# Patient Record
Sex: MALE | Race: WHITE | NOT HISPANIC OR LATINO | Employment: OTHER | URBAN - METROPOLITAN AREA
[De-identification: names, ages, dates, MRNs, and addresses within clinical notes are randomized per-mention and may not be internally consistent; named-entity substitution may affect disease eponyms.]

---

## 2021-01-26 ENCOUNTER — CLINICAL SUPPORT (OUTPATIENT)
Dept: CARDIOLOGY | Facility: HOSPITAL | Age: 61
End: 2021-01-26
Attending: INTERNAL MEDICINE
Payer: MEDICAID

## 2021-01-26 ENCOUNTER — HOSPITAL ENCOUNTER (OUTPATIENT)
Facility: HOSPITAL | Age: 61
Discharge: LEFT BEFORE DISCHARGE | End: 2021-01-26
Attending: EMERGENCY MEDICINE | Admitting: INTERNAL MEDICINE
Payer: MEDICAID

## 2021-01-26 VITALS
BODY MASS INDEX: 29.52 KG/M2 | TEMPERATURE: 98 F | OXYGEN SATURATION: 94 % | SYSTOLIC BLOOD PRESSURE: 175 MMHG | RESPIRATION RATE: 38 BRPM | DIASTOLIC BLOOD PRESSURE: 78 MMHG | WEIGHT: 230 LBS | HEART RATE: 90 BPM | HEIGHT: 74 IN

## 2021-01-26 VITALS — WEIGHT: 230 LBS | BODY MASS INDEX: 29.52 KG/M2 | HEIGHT: 74 IN

## 2021-01-26 DIAGNOSIS — R07.9 CHEST PAIN, UNSPECIFIED TYPE: Primary | ICD-10-CM

## 2021-01-26 DIAGNOSIS — R06.02 SOB (SHORTNESS OF BREATH): ICD-10-CM

## 2021-01-26 DIAGNOSIS — R07.9 CHEST PAIN: ICD-10-CM

## 2021-01-26 DIAGNOSIS — I16.0 HYPERTENSIVE URGENCY: ICD-10-CM

## 2021-01-26 PROBLEM — I25.10 CAD (CORONARY ARTERY DISEASE): Chronic | Status: ACTIVE | Noted: 2021-01-26

## 2021-01-26 PROBLEM — F12.10 TETRAHYDROCANNABINOL (THC) USE DISORDER, MILD, ABUSE: Chronic | Status: ACTIVE | Noted: 2021-01-26

## 2021-01-26 PROBLEM — F17.200 SMOKER: Chronic | Status: ACTIVE | Noted: 2021-01-26

## 2021-01-26 PROBLEM — C44.310 FACIAL BASAL CELL CANCER: Chronic | Status: ACTIVE | Noted: 2021-01-26

## 2021-01-26 PROBLEM — Z78.9 ALCOHOL USE: Chronic | Status: ACTIVE | Noted: 2021-01-26

## 2021-01-26 PROBLEM — F10.90 ALCOHOL USE: Chronic | Status: ACTIVE | Noted: 2021-01-26

## 2021-01-26 PROBLEM — Z65.8 PSYCHOSOCIAL STRESSORS: Chronic | Status: ACTIVE | Noted: 2021-01-26

## 2021-01-26 PROBLEM — Z91.199 NON-COMPLIANCE: Chronic | Status: ACTIVE | Noted: 2021-01-26

## 2021-01-26 LAB
ALBUMIN SERPL BCP-MCNC: 3.9 G/DL (ref 3.5–5.2)
ALP SERPL-CCNC: 82 U/L (ref 55–135)
ALT SERPL W/O P-5'-P-CCNC: 25 U/L (ref 10–44)
ANION GAP SERPL CALC-SCNC: 15 MMOL/L (ref 8–16)
AST SERPL-CCNC: 23 U/L (ref 10–40)
BASOPHILS # BLD AUTO: 0.05 K/UL (ref 0–0.2)
BASOPHILS NFR BLD: 0.5 % (ref 0–1.9)
BILIRUB SERPL-MCNC: 0.6 MG/DL (ref 0.1–1)
BNP SERPL-MCNC: 22 PG/ML (ref 0–99)
BUN SERPL-MCNC: 25 MG/DL (ref 6–20)
CALCIUM SERPL-MCNC: 9.5 MG/DL (ref 8.7–10.5)
CHLORIDE SERPL-SCNC: 100 MMOL/L (ref 95–110)
CK MB SERPL-MCNC: 3.8 NG/ML (ref 0.1–6.5)
CK SERPL-CCNC: 167 U/L (ref 20–200)
CO2 SERPL-SCNC: 22 MMOL/L (ref 23–29)
CREAT SERPL-MCNC: 1.1 MG/DL (ref 0.5–1.4)
DIFFERENTIAL METHOD: ABNORMAL
EOSINOPHIL # BLD AUTO: 0.1 K/UL (ref 0–0.5)
EOSINOPHIL NFR BLD: 1.1 % (ref 0–8)
ERYTHROCYTE [DISTWIDTH] IN BLOOD BY AUTOMATED COUNT: 12.5 % (ref 11.5–14.5)
EST. GFR  (AFRICAN AMERICAN): >60 ML/MIN/1.73 M^2
EST. GFR  (NON AFRICAN AMERICAN): >60 ML/MIN/1.73 M^2
GLUCOSE SERPL-MCNC: 107 MG/DL (ref 70–110)
HCT VFR BLD AUTO: 48.8 % (ref 40–54)
HGB BLD-MCNC: 16.3 G/DL (ref 14–18)
IMM GRANULOCYTES # BLD AUTO: 0.05 K/UL (ref 0–0.04)
IMM GRANULOCYTES NFR BLD AUTO: 0.5 % (ref 0–0.5)
INR PPP: 1.1
LYMPHOCYTES # BLD AUTO: 0.9 K/UL (ref 1–4.8)
LYMPHOCYTES NFR BLD: 9.2 % (ref 18–48)
MAGNESIUM SERPL-MCNC: 2.1 MG/DL (ref 1.6–2.6)
MCH RBC QN AUTO: 35 PG (ref 27–31)
MCHC RBC AUTO-ENTMCNC: 33.4 G/DL (ref 32–36)
MCV RBC AUTO: 105 FL (ref 82–98)
MONOCYTES # BLD AUTO: 0.8 K/UL (ref 0.3–1)
MONOCYTES NFR BLD: 7.5 % (ref 4–15)
NEUTROPHILS # BLD AUTO: 8.1 K/UL (ref 1.8–7.7)
NEUTROPHILS NFR BLD: 81.2 % (ref 38–73)
NRBC BLD-RTO: 0 /100 WBC
PLATELET # BLD AUTO: 178 K/UL (ref 150–350)
PMV BLD AUTO: 10.2 FL (ref 9.2–12.9)
POTASSIUM SERPL-SCNC: 4.5 MMOL/L (ref 3.5–5.1)
PROT SERPL-MCNC: 7.5 G/DL (ref 6–8.4)
PROTHROMBIN TIME: 13.3 SEC (ref 10.6–14.8)
RBC # BLD AUTO: 4.66 M/UL (ref 4.6–6.2)
SARS-COV-2 RDRP RESP QL NAA+PROBE: NEGATIVE
SODIUM SERPL-SCNC: 137 MMOL/L (ref 136–145)
TROPONIN I SERPL DL<=0.01 NG/ML-MCNC: 0.5 NG/ML
TROPONIN I SERPL DL<=0.01 NG/ML-MCNC: <0.03 NG/ML
WBC # BLD AUTO: 9.96 K/UL (ref 3.9–12.7)

## 2021-01-26 PROCEDURE — 93005 ELECTROCARDIOGRAM TRACING: CPT | Performed by: INTERNAL MEDICINE

## 2021-01-26 PROCEDURE — 99220 PR INITIAL OBSERVATION CARE,LEVL III: ICD-10-PCS | Mod: ,,, | Performed by: INTERNAL MEDICINE

## 2021-01-26 PROCEDURE — G0378 HOSPITAL OBSERVATION PER HR: HCPCS

## 2021-01-26 PROCEDURE — 80053 COMPREHEN METABOLIC PANEL: CPT

## 2021-01-26 PROCEDURE — 94640 AIRWAY INHALATION TREATMENT: CPT

## 2021-01-26 PROCEDURE — 84484 ASSAY OF TROPONIN QUANT: CPT | Mod: 91

## 2021-01-26 PROCEDURE — 82553 CREATINE MB FRACTION: CPT

## 2021-01-26 PROCEDURE — 99285 EMERGENCY DEPT VISIT HI MDM: CPT | Mod: 25

## 2021-01-26 PROCEDURE — 99220 PR INITIAL OBSERVATION CARE,LEVL III: CPT | Mod: ,,, | Performed by: INTERNAL MEDICINE

## 2021-01-26 PROCEDURE — 85610 PROTHROMBIN TIME: CPT

## 2021-01-26 PROCEDURE — U0002 COVID-19 LAB TEST NON-CDC: HCPCS

## 2021-01-26 PROCEDURE — 25000242 PHARM REV CODE 250 ALT 637 W/ HCPCS: Performed by: EMERGENCY MEDICINE

## 2021-01-26 PROCEDURE — 93010 EKG 12-LEAD: ICD-10-PCS | Mod: ,,, | Performed by: INTERNAL MEDICINE

## 2021-01-26 PROCEDURE — 93306 TTE W/DOPPLER COMPLETE: CPT

## 2021-01-26 PROCEDURE — 94761 N-INVAS EAR/PLS OXIMETRY MLT: CPT

## 2021-01-26 PROCEDURE — 85025 COMPLETE CBC W/AUTO DIFF WBC: CPT

## 2021-01-26 PROCEDURE — 93010 ELECTROCARDIOGRAM REPORT: CPT | Mod: ,,, | Performed by: INTERNAL MEDICINE

## 2021-01-26 PROCEDURE — 83735 ASSAY OF MAGNESIUM: CPT

## 2021-01-26 PROCEDURE — 84484 ASSAY OF TROPONIN QUANT: CPT

## 2021-01-26 PROCEDURE — 82550 ASSAY OF CK (CPK): CPT

## 2021-01-26 PROCEDURE — 93306 TTE W/DOPPLER COMPLETE: CPT | Mod: 26,,, | Performed by: INTERNAL MEDICINE

## 2021-01-26 PROCEDURE — 36415 COLL VENOUS BLD VENIPUNCTURE: CPT

## 2021-01-26 PROCEDURE — 83880 ASSAY OF NATRIURETIC PEPTIDE: CPT

## 2021-01-26 PROCEDURE — 27000221 HC OXYGEN, UP TO 24 HOURS

## 2021-01-26 PROCEDURE — 93306 ECHO (CUPID ONLY): ICD-10-PCS | Mod: 26,,, | Performed by: INTERNAL MEDICINE

## 2021-01-26 RX ORDER — ONDANSETRON 2 MG/ML
4 INJECTION INTRAMUSCULAR; INTRAVENOUS EVERY 8 HOURS PRN
Status: DISCONTINUED | OUTPATIENT
Start: 2021-01-26 | End: 2021-01-26 | Stop reason: HOSPADM

## 2021-01-26 RX ORDER — POTASSIUM CHLORIDE 20 MEQ/1
40 TABLET, EXTENDED RELEASE ORAL
Status: DISCONTINUED | OUTPATIENT
Start: 2021-01-26 | End: 2021-01-26 | Stop reason: HOSPADM

## 2021-01-26 RX ORDER — POTASSIUM CHLORIDE 7.45 MG/ML
40 INJECTION INTRAVENOUS
Status: DISCONTINUED | OUTPATIENT
Start: 2021-01-26 | End: 2021-01-26 | Stop reason: HOSPADM

## 2021-01-26 RX ORDER — LABETALOL HYDROCHLORIDE 5 MG/ML
10 INJECTION, SOLUTION INTRAVENOUS EVERY 6 HOURS PRN
Status: DISCONTINUED | OUTPATIENT
Start: 2021-01-26 | End: 2021-01-26 | Stop reason: HOSPADM

## 2021-01-26 RX ORDER — PANTOPRAZOLE SODIUM 40 MG/10ML
40 INJECTION, POWDER, LYOPHILIZED, FOR SOLUTION INTRAVENOUS DAILY
Status: DISCONTINUED | OUTPATIENT
Start: 2021-01-26 | End: 2021-01-26 | Stop reason: HOSPADM

## 2021-01-26 RX ORDER — NAPROXEN SODIUM 220 MG/1
81 TABLET, FILM COATED ORAL DAILY
Status: DISCONTINUED | OUTPATIENT
Start: 2021-01-26 | End: 2021-01-26 | Stop reason: HOSPADM

## 2021-01-26 RX ORDER — AMLODIPINE BESYLATE 5 MG/1
5 TABLET ORAL DAILY
Status: ON HOLD | COMMUNITY
End: 2022-01-29 | Stop reason: SDUPTHER

## 2021-01-26 RX ORDER — NITROGLYCERIN 0.4 MG/1
0.4 TABLET SUBLINGUAL EVERY 5 MIN PRN
Status: DISCONTINUED | OUTPATIENT
Start: 2021-01-26 | End: 2021-01-26 | Stop reason: HOSPADM

## 2021-01-26 RX ORDER — CLOPIDOGREL BISULFATE 75 MG/1
75 TABLET ORAL DAILY
Status: ON HOLD | COMMUNITY
End: 2022-01-29 | Stop reason: SDUPTHER

## 2021-01-26 RX ORDER — SODIUM CHLORIDE 0.9 % (FLUSH) 0.9 %
10 SYRINGE (ML) INJECTION EVERY 6 HOURS PRN
Status: DISCONTINUED | OUTPATIENT
Start: 2021-01-26 | End: 2021-01-26 | Stop reason: HOSPADM

## 2021-01-26 RX ORDER — LISINOPRIL 5 MG/1
20 TABLET ORAL DAILY
Status: DISCONTINUED | OUTPATIENT
Start: 2021-01-26 | End: 2021-01-26 | Stop reason: HOSPADM

## 2021-01-26 RX ORDER — POTASSIUM CHLORIDE 7.45 MG/ML
20 INJECTION INTRAVENOUS
Status: DISCONTINUED | OUTPATIENT
Start: 2021-01-26 | End: 2021-01-26 | Stop reason: HOSPADM

## 2021-01-26 RX ORDER — NITROGLYCERIN 80 MG/1
1 PATCH TRANSDERMAL DAILY
Status: DISCONTINUED | OUTPATIENT
Start: 2021-01-26 | End: 2021-01-26 | Stop reason: HOSPADM

## 2021-01-26 RX ORDER — ACETAMINOPHEN 325 MG/1
650 TABLET ORAL EVERY 4 HOURS PRN
Status: DISCONTINUED | OUTPATIENT
Start: 2021-01-26 | End: 2021-01-26 | Stop reason: HOSPADM

## 2021-01-26 RX ORDER — POTASSIUM CHLORIDE 20 MEQ/1
20 TABLET, EXTENDED RELEASE ORAL
Status: DISCONTINUED | OUTPATIENT
Start: 2021-01-26 | End: 2021-01-26 | Stop reason: HOSPADM

## 2021-01-26 RX ORDER — MAGNESIUM SULFATE HEPTAHYDRATE 40 MG/ML
4 INJECTION, SOLUTION INTRAVENOUS
Status: DISCONTINUED | OUTPATIENT
Start: 2021-01-26 | End: 2021-01-26 | Stop reason: HOSPADM

## 2021-01-26 RX ORDER — CALCIUM CHLORIDE IN 0.9 % NACL 1 G/100 ML
1 INTRAVENOUS SOLUTION, PIGGYBACK (ML) INTRAVENOUS
Status: DISCONTINUED | OUTPATIENT
Start: 2021-01-26 | End: 2021-01-26 | Stop reason: HOSPADM

## 2021-01-26 RX ORDER — NAPROXEN SODIUM 220 MG/1
81 TABLET, FILM COATED ORAL DAILY
Status: ON HOLD | COMMUNITY
End: 2022-01-29 | Stop reason: SDUPTHER

## 2021-01-26 RX ORDER — MAGNESIUM SULFATE HEPTAHYDRATE 40 MG/ML
2 INJECTION, SOLUTION INTRAVENOUS
Status: DISCONTINUED | OUTPATIENT
Start: 2021-01-26 | End: 2021-01-26 | Stop reason: HOSPADM

## 2021-01-26 RX ORDER — IPRATROPIUM BROMIDE AND ALBUTEROL SULFATE 2.5; .5 MG/3ML; MG/3ML
3 SOLUTION RESPIRATORY (INHALATION)
Status: COMPLETED | OUTPATIENT
Start: 2021-01-26 | End: 2021-01-26

## 2021-01-26 RX ORDER — AMOXICILLIN 250 MG
1 CAPSULE ORAL 2 TIMES DAILY PRN
Status: DISCONTINUED | OUTPATIENT
Start: 2021-01-26 | End: 2021-01-26 | Stop reason: HOSPADM

## 2021-01-26 RX ORDER — CLOPIDOGREL BISULFATE 75 MG/1
75 TABLET ORAL DAILY
Status: DISCONTINUED | OUTPATIENT
Start: 2021-01-27 | End: 2021-01-26 | Stop reason: HOSPADM

## 2021-01-26 RX ORDER — ASPIRIN 325 MG
325 TABLET ORAL
Status: DISCONTINUED | OUTPATIENT
Start: 2021-01-26 | End: 2021-01-26 | Stop reason: HOSPADM

## 2021-01-26 RX ORDER — ATORVASTATIN CALCIUM 40 MG/1
80 TABLET, FILM COATED ORAL DAILY
Status: DISCONTINUED | OUTPATIENT
Start: 2021-01-26 | End: 2021-01-26 | Stop reason: HOSPADM

## 2021-01-26 RX ORDER — ACETAMINOPHEN 325 MG/1
650 TABLET ORAL
COMMUNITY
End: 2021-01-26

## 2021-01-26 RX ORDER — LANOLIN ALCOHOL/MO/W.PET/CERES
800 CREAM (GRAM) TOPICAL
Status: DISCONTINUED | OUTPATIENT
Start: 2021-01-26 | End: 2021-01-26 | Stop reason: HOSPADM

## 2021-01-26 RX ORDER — MAGNESIUM SULFATE 1 G/100ML
1 INJECTION INTRAVENOUS
Status: DISCONTINUED | OUTPATIENT
Start: 2021-01-26 | End: 2021-01-26 | Stop reason: HOSPADM

## 2021-01-26 RX ADMIN — IPRATROPIUM BROMIDE AND ALBUTEROL SULFATE 3 ML: .5; 2.5 SOLUTION RESPIRATORY (INHALATION) at 09:01

## 2021-01-28 LAB
AORTIC ROOT ANNULUS: 4.05 CM
AORTIC VALVE CUSP SEPERATION: 2.36 CM
AV INDEX (PROSTH): 1.03
AV MEAN GRADIENT: 3 MMHG
AV PEAK GRADIENT: 5 MMHG
AV VALVE AREA: 4.96 CM2
AV VELOCITY RATIO: 97.43
BSA FOR ECHO PROCEDURE: 2.33 M2
CV ECHO LV RWT: 0.53 CM
DOP CALC AO PEAK VEL: 1.12 M/S
DOP CALC AO VTI: 21.5 CM
DOP CALC LVOT AREA: 4.8 CM2
DOP CALC LVOT DIAMETER: 2.47 CM
DOP CALC LVOT PEAK VEL: 109.12 M/S
DOP CALC LVOT STROKE VOLUME: 106.56 CM3
DOP CALCLVOT PEAK VEL VTI: 22.25 CM
E WAVE DECELERATION TIME: 230.29 MSEC
E/A RATIO: 0.96
E/E' RATIO: 8.53 M/S
ECHO LV POSTERIOR WALL: 1.12 CM (ref 0.6–1.1)
FRACTIONAL SHORTENING: 35 % (ref 28–44)
INTERVENTRICULAR SEPTUM: 1.11 CM (ref 0.6–1.1)
IVRT: 91.17 MSEC
LEFT INTERNAL DIMENSION IN SYSTOLE: 2.78 CM (ref 2.1–4)
LEFT VENTRICLE MASS INDEX: 71 G/M2
LEFT VENTRICULAR INTERNAL DIMENSION IN DIASTOLE: 4.25 CM (ref 3.5–6)
LEFT VENTRICULAR MASS: 163.12 G
LV LATERAL E/E' RATIO: 7.11 M/S
LV SEPTAL E/E' RATIO: 10.67 M/S
MV PEAK A VEL: 0.67 M/S
MV PEAK E VEL: 0.64 M/S
PISA TR MAX VEL: 3.01 M/S
PV PEAK VELOCITY: 66 CM/S
RIGHT VENTRICULAR END-DIASTOLIC DIMENSION: 301 CM
TDI LATERAL: 0.09 M/S
TDI SEPTAL: 0.06 M/S
TDI: 0.08 M/S
TR MAX PG: 36 MMHG

## 2022-01-27 ENCOUNTER — HOSPITAL ENCOUNTER (INPATIENT)
Facility: HOSPITAL | Age: 62
LOS: 1 days | Discharge: HOME | DRG: 247 | End: 2022-01-29
Attending: EMERGENCY MEDICINE | Admitting: INTERNAL MEDICINE
Payer: MEDICARE

## 2022-01-27 DIAGNOSIS — Z95.5 STATUS POST INSERTION OF DRUG ELUTING CORONARY ARTERY STENT: ICD-10-CM

## 2022-01-27 DIAGNOSIS — I25.10 CAD (CORONARY ARTERY DISEASE): ICD-10-CM

## 2022-01-27 DIAGNOSIS — R07.89 OTHER CHEST PAIN: ICD-10-CM

## 2022-01-27 DIAGNOSIS — R07.9 CHEST PAIN: ICD-10-CM

## 2022-01-27 DIAGNOSIS — I21.4 NSTEMI (NON-ST ELEVATED MYOCARDIAL INFARCTION): ICD-10-CM

## 2022-01-27 LAB
ALBUMIN SERPL BCP-MCNC: 3.7 G/DL (ref 3.5–5.2)
ALP SERPL-CCNC: 64 U/L (ref 55–135)
ALT SERPL W/O P-5'-P-CCNC: 20 U/L (ref 10–44)
ANION GAP SERPL CALC-SCNC: 11 MMOL/L (ref 8–16)
AST SERPL-CCNC: 19 U/L (ref 10–40)
BASOPHILS # BLD AUTO: 0.03 K/UL (ref 0–0.2)
BASOPHILS NFR BLD: 0.4 % (ref 0–1.9)
BILIRUB SERPL-MCNC: 0.7 MG/DL (ref 0.1–1)
BNP SERPL-MCNC: 18 PG/ML (ref 0–99)
BUN SERPL-MCNC: 16 MG/DL (ref 8–23)
CALCIUM SERPL-MCNC: 8.9 MG/DL (ref 8.7–10.5)
CHLORIDE SERPL-SCNC: 102 MMOL/L (ref 95–110)
CO2 SERPL-SCNC: 25 MMOL/L (ref 23–29)
CREAT SERPL-MCNC: 1 MG/DL (ref 0.5–1.4)
DIFFERENTIAL METHOD: ABNORMAL
EOSINOPHIL # BLD AUTO: 0.1 K/UL (ref 0–0.5)
EOSINOPHIL NFR BLD: 1.4 % (ref 0–8)
ERYTHROCYTE [DISTWIDTH] IN BLOOD BY AUTOMATED COUNT: 12.6 % (ref 11.5–14.5)
EST. GFR  (AFRICAN AMERICAN): >60 ML/MIN/1.73 M^2
EST. GFR  (NON AFRICAN AMERICAN): >60 ML/MIN/1.73 M^2
GLUCOSE SERPL-MCNC: 103 MG/DL (ref 70–110)
HCT VFR BLD AUTO: 46.6 % (ref 40–54)
HGB BLD-MCNC: 15.4 G/DL (ref 14–18)
IMM GRANULOCYTES # BLD AUTO: 0.03 K/UL (ref 0–0.04)
IMM GRANULOCYTES NFR BLD AUTO: 0.4 % (ref 0–0.5)
LYMPHOCYTES # BLD AUTO: 1 K/UL (ref 1–4.8)
LYMPHOCYTES NFR BLD: 12.9 % (ref 18–48)
MCH RBC QN AUTO: 32.7 PG (ref 27–31)
MCHC RBC AUTO-ENTMCNC: 33 G/DL (ref 32–36)
MCV RBC AUTO: 99 FL (ref 82–98)
MONOCYTES # BLD AUTO: 0.7 K/UL (ref 0.3–1)
MONOCYTES NFR BLD: 8.4 % (ref 4–15)
NEUTROPHILS # BLD AUTO: 6 K/UL (ref 1.8–7.7)
NEUTROPHILS NFR BLD: 76.5 % (ref 38–73)
NRBC BLD-RTO: 0 /100 WBC
PLATELET # BLD AUTO: 197 K/UL (ref 150–450)
PMV BLD AUTO: 10.5 FL (ref 9.2–12.9)
POTASSIUM SERPL-SCNC: 4.1 MMOL/L (ref 3.5–5.1)
PROT SERPL-MCNC: 7.2 G/DL (ref 6–8.4)
RBC # BLD AUTO: 4.71 M/UL (ref 4.6–6.2)
SODIUM SERPL-SCNC: 138 MMOL/L (ref 136–145)
TROPONIN I SERPL DL<=0.01 NG/ML-MCNC: 0.03 NG/ML
WBC # BLD AUTO: 7.89 K/UL (ref 3.9–12.7)

## 2022-01-27 PROCEDURE — 83880 ASSAY OF NATRIURETIC PEPTIDE: CPT | Performed by: STUDENT IN AN ORGANIZED HEALTH CARE EDUCATION/TRAINING PROGRAM

## 2022-01-27 PROCEDURE — 63600175 PHARM REV CODE 636 W HCPCS: Performed by: STUDENT IN AN ORGANIZED HEALTH CARE EDUCATION/TRAINING PROGRAM

## 2022-01-27 PROCEDURE — 93010 EKG 12-LEAD: ICD-10-PCS | Mod: ,,, | Performed by: GENERAL PRACTICE

## 2022-01-27 PROCEDURE — 85025 COMPLETE CBC W/AUTO DIFF WBC: CPT | Performed by: STUDENT IN AN ORGANIZED HEALTH CARE EDUCATION/TRAINING PROGRAM

## 2022-01-27 PROCEDURE — 96374 THER/PROPH/DIAG INJ IV PUSH: CPT

## 2022-01-27 PROCEDURE — 25500020 PHARM REV CODE 255: Performed by: EMERGENCY MEDICINE

## 2022-01-27 PROCEDURE — 93005 ELECTROCARDIOGRAM TRACING: CPT | Performed by: GENERAL PRACTICE

## 2022-01-27 PROCEDURE — 84484 ASSAY OF TROPONIN QUANT: CPT | Performed by: STUDENT IN AN ORGANIZED HEALTH CARE EDUCATION/TRAINING PROGRAM

## 2022-01-27 PROCEDURE — 25000242 PHARM REV CODE 250 ALT 637 W/ HCPCS: Performed by: STUDENT IN AN ORGANIZED HEALTH CARE EDUCATION/TRAINING PROGRAM

## 2022-01-27 PROCEDURE — 80053 COMPREHEN METABOLIC PANEL: CPT | Performed by: STUDENT IN AN ORGANIZED HEALTH CARE EDUCATION/TRAINING PROGRAM

## 2022-01-27 PROCEDURE — 99285 EMERGENCY DEPT VISIT HI MDM: CPT | Mod: 25

## 2022-01-27 PROCEDURE — U0002 COVID-19 LAB TEST NON-CDC: HCPCS | Performed by: STUDENT IN AN ORGANIZED HEALTH CARE EDUCATION/TRAINING PROGRAM

## 2022-01-27 PROCEDURE — 93010 ELECTROCARDIOGRAM REPORT: CPT | Mod: ,,, | Performed by: GENERAL PRACTICE

## 2022-01-27 RX ORDER — ONDANSETRON 2 MG/ML
4 INJECTION INTRAMUSCULAR; INTRAVENOUS
Status: COMPLETED | OUTPATIENT
Start: 2022-01-27 | End: 2022-01-27

## 2022-01-27 RX ORDER — NITROGLYCERIN 0.4 MG/1
0.4 TABLET SUBLINGUAL EVERY 5 MIN PRN
Status: DISCONTINUED | OUTPATIENT
Start: 2022-01-27 | End: 2022-01-29 | Stop reason: HOSPADM

## 2022-01-27 RX ADMIN — NITROGLYCERIN 0.4 MG: 0.4 TABLET SUBLINGUAL at 09:01

## 2022-01-27 RX ADMIN — ONDANSETRON 4 MG: 2 INJECTION INTRAMUSCULAR; INTRAVENOUS at 09:01

## 2022-01-27 RX ADMIN — IOHEXOL 100 ML: 350 INJECTION, SOLUTION INTRAVENOUS at 11:01

## 2022-01-27 NOTE — Clinical Note
The catheter was repositioned into the ostium   right coronary artery. An angiography was performed of the right coronary arteries. Multiple views were taken. The angiography was performed via power injection. The injected amount was 6 mL contrast at 3 mL/s. The PSI from the power injection was 300.

## 2022-01-27 NOTE — Clinical Note
A dressing was applied to the incision. Gauze and tegaderm to be applied to access site post closure device

## 2022-01-27 NOTE — Clinical Note
125 ml of contrast were injected throughout the case. 25 mL of contrast was the total wasted during the case. 150 mL was the total amount used during the case.

## 2022-01-27 NOTE — Clinical Note
The catheter was inserted into the ostium   left main. An angiography was performed of the left coronary arteries. Multiple views were taken. The angiography was performed via power injection. The injected amount was 8 mL contrast at 4 mL/s. The PSI from the power injection was 450.

## 2022-01-27 NOTE — Clinical Note
The radial band was applied to the right radial artery. 14 cc's of air were inserted into the closure device. Attempted access site

## 2022-01-28 PROBLEM — F10.10 ALCOHOL ABUSE: Status: ACTIVE | Noted: 2022-01-28

## 2022-01-28 PROBLEM — I21.4 NSTEMI (NON-ST ELEVATED MYOCARDIAL INFARCTION): Status: ACTIVE | Noted: 2022-01-28

## 2022-01-28 PROBLEM — R07.9 CHEST PAIN: Status: ACTIVE | Noted: 2022-01-28

## 2022-01-28 LAB
ALBUMIN SERPL BCP-MCNC: 3.5 G/DL (ref 3.5–5.2)
ALP SERPL-CCNC: 63 U/L (ref 55–135)
ALT SERPL W/O P-5'-P-CCNC: 18 U/L (ref 10–44)
AMPHET+METHAMPHET UR QL: NEGATIVE
AMPHET+METHAMPHET UR QL: NEGATIVE
ANION GAP SERPL CALC-SCNC: 9 MMOL/L (ref 8–16)
APTT PPP: 27.7 SEC (ref 23.3–35.1)
AST SERPL-CCNC: 20 U/L (ref 10–40)
BARBITURATES UR QL SCN>200 NG/ML: NEGATIVE
BARBITURATES UR QL SCN>200 NG/ML: NEGATIVE
BASOPHILS # BLD AUTO: 0.03 K/UL (ref 0–0.2)
BASOPHILS NFR BLD: 0.5 % (ref 0–1.9)
BENZODIAZ UR QL SCN>200 NG/ML: NEGATIVE
BENZODIAZ UR QL SCN>200 NG/ML: NEGATIVE
BILIRUB SERPL-MCNC: 0.7 MG/DL (ref 0.1–1)
BUN SERPL-MCNC: 16 MG/DL (ref 8–23)
BZE UR QL SCN: NEGATIVE
BZE UR QL SCN: NEGATIVE
CALCIUM SERPL-MCNC: 8.5 MG/DL (ref 8.7–10.5)
CANNABINOIDS UR QL SCN: ABNORMAL
CANNABINOIDS UR QL SCN: ABNORMAL
CHLORIDE SERPL-SCNC: 103 MMOL/L (ref 95–110)
CK MB SERPL-MCNC: 12.5 NG/ML (ref 0.1–6.5)
CK MB SERPL-MCNC: 13 NG/ML (ref 0.1–6.5)
CO2 SERPL-SCNC: 27 MMOL/L (ref 23–29)
CREAT SERPL-MCNC: 0.9 MG/DL (ref 0.5–1.4)
CREAT UR-MCNC: 112 MG/DL (ref 23–375)
CREAT UR-MCNC: 112 MG/DL (ref 23–375)
DIFFERENTIAL METHOD: ABNORMAL
EOSINOPHIL # BLD AUTO: 0.1 K/UL (ref 0–0.5)
EOSINOPHIL NFR BLD: 1.9 % (ref 0–8)
ERYTHROCYTE [DISTWIDTH] IN BLOOD BY AUTOMATED COUNT: 12.8 % (ref 11.5–14.5)
EST. GFR  (AFRICAN AMERICAN): >60 ML/MIN/1.73 M^2
EST. GFR  (NON AFRICAN AMERICAN): >60 ML/MIN/1.73 M^2
GLUCOSE SERPL-MCNC: 101 MG/DL (ref 70–110)
HCT VFR BLD AUTO: 46.4 % (ref 40–54)
HGB BLD-MCNC: 15.2 G/DL (ref 14–18)
IMM GRANULOCYTES # BLD AUTO: 0.02 K/UL (ref 0–0.04)
IMM GRANULOCYTES NFR BLD AUTO: 0.3 % (ref 0–0.5)
INR PPP: 1.1
LYMPHOCYTES # BLD AUTO: 1.4 K/UL (ref 1–4.8)
LYMPHOCYTES NFR BLD: 20.9 % (ref 18–48)
MCH RBC QN AUTO: 32.7 PG (ref 27–31)
MCHC RBC AUTO-ENTMCNC: 32.8 G/DL (ref 32–36)
MCV RBC AUTO: 100 FL (ref 82–98)
MONOCYTES # BLD AUTO: 0.6 K/UL (ref 0.3–1)
MONOCYTES NFR BLD: 9.5 % (ref 4–15)
NEUTROPHILS # BLD AUTO: 4.3 K/UL (ref 1.8–7.7)
NEUTROPHILS NFR BLD: 66.9 % (ref 38–73)
NRBC BLD-RTO: 0 /100 WBC
OPIATES UR QL SCN: NEGATIVE
OPIATES UR QL SCN: NEGATIVE
PCP UR QL SCN>25 NG/ML: NEGATIVE
PCP UR QL SCN>25 NG/ML: NEGATIVE
PLATELET # BLD AUTO: 173 K/UL (ref 150–450)
PMV BLD AUTO: 10 FL (ref 9.2–12.9)
POC ACTIVATED CLOTTING TIME K: 279 SEC (ref 74–137)
POTASSIUM SERPL-SCNC: 3.9 MMOL/L (ref 3.5–5.1)
PROT SERPL-MCNC: 6.6 G/DL (ref 6–8.4)
PROTHROMBIN TIME: 13.1 SEC (ref 11.4–13.7)
RBC # BLD AUTO: 4.65 M/UL (ref 4.6–6.2)
SAMPLE: ABNORMAL
SARS-COV-2 RDRP RESP QL NAA+PROBE: NEGATIVE
SODIUM SERPL-SCNC: 139 MMOL/L (ref 136–145)
TOXICOLOGY INFORMATION: ABNORMAL
TOXICOLOGY INFORMATION: ABNORMAL
TROPONIN I SERPL DL<=0.01 NG/ML-MCNC: 0.3 NG/ML
TROPONIN I SERPL DL<=0.01 NG/ML-MCNC: 0.86 NG/ML
WBC # BLD AUTO: 6.45 K/UL (ref 3.9–12.7)

## 2022-01-28 PROCEDURE — 84484 ASSAY OF TROPONIN QUANT: CPT | Mod: 91 | Performed by: INTERNAL MEDICINE

## 2022-01-28 PROCEDURE — 21400001 HC TELEMETRY ROOM

## 2022-01-28 PROCEDURE — 85025 COMPLETE CBC W/AUTO DIFF WBC: CPT | Performed by: INTERNAL MEDICINE

## 2022-01-28 PROCEDURE — 93010 EKG 12-LEAD: ICD-10-PCS | Mod: ,,, | Performed by: SPECIALIST

## 2022-01-28 PROCEDURE — 99153 MOD SED SAME PHYS/QHP EA: CPT | Performed by: INTERNAL MEDICINE

## 2022-01-28 PROCEDURE — 99222 1ST HOSP IP/OBS MODERATE 55: CPT | Mod: ,,, | Performed by: INTERNAL MEDICINE

## 2022-01-28 PROCEDURE — 99152 PR MOD CONSCIOUS SEDATION, SAME PHYS, 5+ YRS, FIRST 15 MIN: ICD-10-PCS | Mod: ,,, | Performed by: INTERNAL MEDICINE

## 2022-01-28 PROCEDURE — C1769 GUIDE WIRE: HCPCS | Performed by: INTERNAL MEDICINE

## 2022-01-28 PROCEDURE — 85730 THROMBOPLASTIN TIME PARTIAL: CPT | Performed by: INTERNAL MEDICINE

## 2022-01-28 PROCEDURE — C1894 INTRO/SHEATH, NON-LASER: HCPCS | Performed by: INTERNAL MEDICINE

## 2022-01-28 PROCEDURE — C1874 STENT, COATED/COV W/DEL SYS: HCPCS | Performed by: INTERNAL MEDICINE

## 2022-01-28 PROCEDURE — 85610 PROTHROMBIN TIME: CPT | Performed by: INTERNAL MEDICINE

## 2022-01-28 PROCEDURE — 25000003 PHARM REV CODE 250: Performed by: INTERNAL MEDICINE

## 2022-01-28 PROCEDURE — 27201423 OPTIME MED/SURG SUP & DEVICES STERILE SUPPLY: Performed by: INTERNAL MEDICINE

## 2022-01-28 PROCEDURE — C9606 PERC D-E COR REVASC W AMI S: HCPCS | Mod: LC | Performed by: INTERNAL MEDICINE

## 2022-01-28 PROCEDURE — 80307 DRUG TEST PRSMV CHEM ANLYZR: CPT | Performed by: INTERNAL MEDICINE

## 2022-01-28 PROCEDURE — C1887 CATHETER, GUIDING: HCPCS | Performed by: INTERNAL MEDICINE

## 2022-01-28 PROCEDURE — 99152 MOD SED SAME PHYS/QHP 5/>YRS: CPT | Performed by: INTERNAL MEDICINE

## 2022-01-28 PROCEDURE — 80053 COMPREHEN METABOLIC PANEL: CPT | Performed by: INTERNAL MEDICINE

## 2022-01-28 PROCEDURE — 99222 PR INITIAL HOSPITAL CARE,LEVL II: ICD-10-PCS | Mod: ,,, | Performed by: INTERNAL MEDICINE

## 2022-01-28 PROCEDURE — 92928 PRQ TCAT PLMT NTRAC ST 1 LES: CPT | Mod: LC,,, | Performed by: INTERNAL MEDICINE

## 2022-01-28 PROCEDURE — 63600175 PHARM REV CODE 636 W HCPCS: Performed by: INTERNAL MEDICINE

## 2022-01-28 PROCEDURE — 93005 ELECTROCARDIOGRAM TRACING: CPT | Performed by: SPECIALIST

## 2022-01-28 PROCEDURE — 25500020 PHARM REV CODE 255: Performed by: INTERNAL MEDICINE

## 2022-01-28 PROCEDURE — 93458 L HRT ARTERY/VENTRICLE ANGIO: CPT | Mod: XU | Performed by: INTERNAL MEDICINE

## 2022-01-28 PROCEDURE — 93458 PR CATH PLACE/CORON ANGIO, IMG SUPER/INTERP,W LEFT HEART VENTRICULOGRAPHY: ICD-10-PCS | Mod: 26,59,, | Performed by: INTERNAL MEDICINE

## 2022-01-28 PROCEDURE — 93458 L HRT ARTERY/VENTRICLE ANGIO: CPT | Mod: 26,59,, | Performed by: INTERNAL MEDICINE

## 2022-01-28 PROCEDURE — 99152 MOD SED SAME PHYS/QHP 5/>YRS: CPT | Mod: ,,, | Performed by: INTERNAL MEDICINE

## 2022-01-28 PROCEDURE — 36415 COLL VENOUS BLD VENIPUNCTURE: CPT | Performed by: INTERNAL MEDICINE

## 2022-01-28 PROCEDURE — 92928 PR STENT: ICD-10-PCS | Mod: LC,,, | Performed by: INTERNAL MEDICINE

## 2022-01-28 PROCEDURE — C1760 CLOSURE DEV, VASC: HCPCS | Performed by: INTERNAL MEDICINE

## 2022-01-28 PROCEDURE — 82553 CREATINE MB FRACTION: CPT | Mod: 91 | Performed by: INTERNAL MEDICINE

## 2022-01-28 PROCEDURE — 93010 ELECTROCARDIOGRAM REPORT: CPT | Mod: ,,, | Performed by: SPECIALIST

## 2022-01-28 DEVICE — DEVICE CLOSURE  ANGIO-SEAL 6FR 610130: Type: IMPLANTABLE DEVICE | Site: CORONARY | Status: FUNCTIONAL

## 2022-01-28 DEVICE — STENT RONYX22515UX RESOLUTE ONYX 2.25X15
Type: IMPLANTABLE DEVICE | Site: CORONARY | Status: FUNCTIONAL
Brand: RESOLUTE ONYX™

## 2022-01-28 RX ORDER — MAGNESIUM SULFATE HEPTAHYDRATE 40 MG/ML
2 INJECTION, SOLUTION INTRAVENOUS
Status: DISCONTINUED | OUTPATIENT
Start: 2022-01-28 | End: 2022-01-29 | Stop reason: HOSPADM

## 2022-01-28 RX ORDER — AMLODIPINE BESYLATE 5 MG/1
10 TABLET ORAL DAILY
Status: DISCONTINUED | OUTPATIENT
Start: 2022-01-29 | End: 2022-01-29 | Stop reason: HOSPADM

## 2022-01-28 RX ORDER — POTASSIUM CHLORIDE 7.45 MG/ML
20 INJECTION INTRAVENOUS
Status: DISCONTINUED | OUTPATIENT
Start: 2022-01-28 | End: 2022-01-29 | Stop reason: HOSPADM

## 2022-01-28 RX ORDER — ATORVASTATIN CALCIUM 40 MG/1
80 TABLET, FILM COATED ORAL DAILY
Status: DISCONTINUED | OUTPATIENT
Start: 2022-01-29 | End: 2022-01-29 | Stop reason: HOSPADM

## 2022-01-28 RX ORDER — LIDOCAINE HYDROCHLORIDE 10 MG/ML
INJECTION, SOLUTION EPIDURAL; INFILTRATION; INTRACAUDAL; PERINEURAL
Status: DISCONTINUED | OUTPATIENT
Start: 2022-01-28 | End: 2022-01-28 | Stop reason: HOSPADM

## 2022-01-28 RX ORDER — HEPARIN SODIUM,PORCINE/D5W 25000/250
0-40 INTRAVENOUS SOLUTION INTRAVENOUS CONTINUOUS
Status: DISCONTINUED | OUTPATIENT
Start: 2022-01-28 | End: 2022-01-28

## 2022-01-28 RX ORDER — SODIUM CHLORIDE 9 MG/ML
INJECTION, SOLUTION INTRAVENOUS ONCE
Status: COMPLETED | OUTPATIENT
Start: 2022-01-28 | End: 2022-01-28

## 2022-01-28 RX ORDER — NAPROXEN SODIUM 220 MG/1
81 TABLET, FILM COATED ORAL DAILY
Status: DISCONTINUED | OUTPATIENT
Start: 2022-01-28 | End: 2022-01-29 | Stop reason: HOSPADM

## 2022-01-28 RX ORDER — MIDAZOLAM HYDROCHLORIDE 1 MG/ML
INJECTION INTRAMUSCULAR; INTRAVENOUS
Status: DISCONTINUED | OUTPATIENT
Start: 2022-01-28 | End: 2022-01-28 | Stop reason: HOSPADM

## 2022-01-28 RX ORDER — TIROFIBAN HYDROCHLORIDE 50 UG/ML
0.15 INJECTION INTRAVENOUS CONTINUOUS
Status: DISCONTINUED | OUTPATIENT
Start: 2022-01-28 | End: 2022-01-28

## 2022-01-28 RX ORDER — POTASSIUM CHLORIDE 7.45 MG/ML
40 INJECTION INTRAVENOUS
Status: DISCONTINUED | OUTPATIENT
Start: 2022-01-28 | End: 2022-01-29 | Stop reason: HOSPADM

## 2022-01-28 RX ORDER — LANOLIN ALCOHOL/MO/W.PET/CERES
800 CREAM (GRAM) TOPICAL
Status: DISCONTINUED | OUTPATIENT
Start: 2022-01-28 | End: 2022-01-29 | Stop reason: HOSPADM

## 2022-01-28 RX ORDER — POTASSIUM CHLORIDE 20 MEQ/1
40 TABLET, EXTENDED RELEASE ORAL
Status: DISCONTINUED | OUTPATIENT
Start: 2022-01-28 | End: 2022-01-29 | Stop reason: HOSPADM

## 2022-01-28 RX ORDER — HEPARIN SODIUM 1000 [USP'U]/ML
INJECTION, SOLUTION INTRAVENOUS; SUBCUTANEOUS
Status: DISCONTINUED | OUTPATIENT
Start: 2022-01-28 | End: 2022-01-28 | Stop reason: HOSPADM

## 2022-01-28 RX ORDER — POTASSIUM CHLORIDE 20 MEQ/1
20 TABLET, EXTENDED RELEASE ORAL
Status: DISCONTINUED | OUTPATIENT
Start: 2022-01-28 | End: 2022-01-29 | Stop reason: HOSPADM

## 2022-01-28 RX ORDER — MAGNESIUM SULFATE HEPTAHYDRATE 40 MG/ML
4 INJECTION, SOLUTION INTRAVENOUS
Status: DISCONTINUED | OUTPATIENT
Start: 2022-01-28 | End: 2022-01-29 | Stop reason: HOSPADM

## 2022-01-28 RX ORDER — LISINOPRIL 5 MG/1
5 TABLET ORAL DAILY
Status: DISCONTINUED | OUTPATIENT
Start: 2022-01-28 | End: 2022-01-29 | Stop reason: HOSPADM

## 2022-01-28 RX ORDER — FENTANYL CITRATE 50 UG/ML
INJECTION, SOLUTION INTRAMUSCULAR; INTRAVENOUS
Status: DISCONTINUED | OUTPATIENT
Start: 2022-01-28 | End: 2022-01-28 | Stop reason: HOSPADM

## 2022-01-28 RX ORDER — TALC
9 POWDER (GRAM) TOPICAL NIGHTLY
Status: DISCONTINUED | OUTPATIENT
Start: 2022-01-28 | End: 2022-01-29 | Stop reason: HOSPADM

## 2022-01-28 RX ORDER — CLOPIDOGREL BISULFATE 75 MG/1
TABLET ORAL
Status: DISCONTINUED | OUTPATIENT
Start: 2022-01-28 | End: 2022-01-28 | Stop reason: HOSPADM

## 2022-01-28 RX ORDER — FAMOTIDINE 20 MG/1
20 TABLET, FILM COATED ORAL 2 TIMES DAILY
Status: DISCONTINUED | OUTPATIENT
Start: 2022-01-28 | End: 2022-01-29 | Stop reason: HOSPADM

## 2022-01-28 RX ORDER — MAGNESIUM SULFATE 1 G/100ML
1 INJECTION INTRAVENOUS
Status: DISCONTINUED | OUTPATIENT
Start: 2022-01-28 | End: 2022-01-29 | Stop reason: HOSPADM

## 2022-01-28 RX ORDER — CLOPIDOGREL BISULFATE 75 MG/1
75 TABLET ORAL DAILY
Status: DISCONTINUED | OUTPATIENT
Start: 2022-01-28 | End: 2022-01-29 | Stop reason: HOSPADM

## 2022-01-28 RX ORDER — NITROGLYCERIN 5 MG/ML
INJECTION, SOLUTION INTRAVENOUS
Status: DISCONTINUED | OUTPATIENT
Start: 2022-01-28 | End: 2022-01-28 | Stop reason: HOSPADM

## 2022-01-28 RX ORDER — AMLODIPINE BESYLATE 5 MG/1
5 TABLET ORAL DAILY
Status: DISCONTINUED | OUTPATIENT
Start: 2022-01-28 | End: 2022-01-28

## 2022-01-28 RX ADMIN — LISINOPRIL 5 MG: 5 TABLET ORAL at 06:01

## 2022-01-28 RX ADMIN — SODIUM CHLORIDE: 0.9 INJECTION, SOLUTION INTRAVENOUS at 12:01

## 2022-01-28 RX ADMIN — HEPARIN SODIUM AND DEXTROSE 12 UNITS/KG/HR: 10000; 5 INJECTION INTRAVENOUS at 06:01

## 2022-01-28 RX ADMIN — FAMOTIDINE 20 MG: 20 TABLET, FILM COATED ORAL at 11:01

## 2022-01-28 RX ADMIN — ASPIRIN 81 MG 81 MG: 81 TABLET ORAL at 12:01

## 2022-01-28 RX ADMIN — AMLODIPINE BESYLATE 5 MG: 5 TABLET ORAL at 12:01

## 2022-01-28 RX ADMIN — MELATONIN 9 MG: at 11:01

## 2022-01-28 RX ADMIN — TIROFIBAN 0.15 MCG/KG/MIN: 5 INJECTION, SOLUTION INTRAVENOUS at 12:01

## 2022-01-28 NOTE — NURSING TRANSFER
Nursing Transfer Note      1/28/2022     Reason patient is being transferred: inpatient    Transfer To: 2520    Transfer via stretcher    Transfer with cardiac monitoring    Transported by MARRY Silverman    Medicines sent: n/a    Any special needs or follow-up needed: n/a    Chart send with patient: Yes    Notified:     Patient reassessed at: 01/28/22 1422 (date, time)    Upon arrival to floor: cardiac monitor applied and patient oriented to room

## 2022-01-28 NOTE — H&P
"[facility] Medicine  History & Physical    Patient Name: Stuart Adler  MRN: [id number]  Patient Class: IP- Inpatient  Admission Date: 1/27/2022  Attending Physician: David Kim MD   Primary Care Provider: Primary Doctor No         Patient information was obtained from patient and ER records.     Subjective:     Principal Problem:NSTEMI (non-ST elevated myocardial infarction)    Chief Complaint:   Chief Complaint   Patient presents with    Chest Pain     "Chest pain for last three hours"         HPI: 61 year old patient getting admitted with NSTEMI  Pt started having L sided chest pain all of sudden while at rest  Describes as severe chest pain with radiation to neck and LUE  Associated with mild nausea and came to ER  Pt was given NG and got admitted       Past Medical History:   Diagnosis Date    CAD (coronary artery disease)     Ethanolism     Heart attack     Hypertension     Smoker        Past Surgical History:   Procedure Laterality Date    FACIAL COSMETIC SURGERY      PERCUTANEOUS CORONARY INTERVENTION (PCI) FOR CHRONIC TOTAL OCCLUSION OF CORONARY ARTERY         Review of patient's allergies indicates:  No Known Allergies    No current facility-administered medications on file prior to encounter.     Current Outpatient Medications on File Prior to Encounter   Medication Sig    amLODIPine (NORVASC) 5 MG tablet Take 5 mg by mouth once daily.     aspirin 81 MG Chew Take 81 mg by mouth once daily.     clopidogreL (PLAVIX) 75 mg tablet Take 75 mg by mouth once daily.     Family History     Problem Relation (Age of Onset)    Heart disease Father        Tobacco Use    Smoking status: Current Every Day Smoker     Packs/day: 1.00    Smokeless tobacco: Not on file   Substance and Sexual Activity    Alcohol use: Yes     Comment: 1/4 bottle whiskey every day    Drug use: Yes     Types: Marijuana    Sexual activity: Not on file     Review of Systems   Constitutional: Negative for " activity change and appetite change.   HENT: Negative for congestion and dental problem.    Eyes: Negative for discharge and itching.   Respiratory: Negative for shortness of breath.    Cardiovascular: Positive for chest pain.   Gastrointestinal: Negative for abdominal distention and abdominal pain.   Endocrine: Negative for cold intolerance.   Genitourinary: Negative for difficulty urinating and dysuria.   Musculoskeletal: Negative for arthralgias and back pain.   Skin: Negative for color change.   Neurological: Negative for dizziness and facial asymmetry.   Hematological: Negative for adenopathy.   Psychiatric/Behavioral: Negative for agitation and behavioral problems.     Objective:     Vital Signs (Most Recent):  Temp: 98.3 °F (36.8 °C) (01/27/22 2209)  Pulse: 74 (01/28/22 0212)  Resp: 18 (01/28/22 0212)  BP: (!) 164/84 (01/28/22 0212)  SpO2: 96 % (01/28/22 0212) Vital Signs (24h Range):  Temp:  [97.6 °F (36.4 °C)-98.3 °F (36.8 °C)] 98.3 °F (36.8 °C)  Pulse:  [70-80] 74  Resp:  [14-18] 18  SpO2:  [95 %-98 %] 96 %  BP: (153-166)/(84-95) 164/84     Weight: 125 kg (275 lb 9.2 oz)  Body mass index is 35.38 kg/m².    Physical Exam  Vitals and nursing note reviewed.   Constitutional:       Appearance: He is well-developed.   HENT:      Head: Atraumatic.      Right Ear: External ear normal.      Left Ear: External ear normal.      Nose: Nose normal.      Mouth/Throat:      Mouth: Mucous membranes are moist.   Eyes:      General: No scleral icterus.  Cardiovascular:      Rate and Rhythm: Normal rate.   Pulmonary:      Effort: Pulmonary effort is normal.   Abdominal:      General: There is no distension.   Musculoskeletal:         General: Normal range of motion.      Cervical back: Full passive range of motion without pain and normal range of motion.   Skin:     General: Skin is warm.      Comments: basal cell cancer R face area    Neurological:      Mental Status: He is alert and oriented to person, place, and time.    Psychiatric:         Behavior: Behavior normal.             Significant Labs:   All pertinent labs within the past 24 hours have been reviewed.  CBC:   Recent Labs   Lab 01/27/22  2204   WBC 7.89   HGB 15.4   HCT 46.6        CMP:   Recent Labs   Lab 01/27/22  2204      K 4.1      CO2 25      BUN 16   CREATININE 1.0   CALCIUM 8.9   PROT 7.2   ALBUMIN 3.7   BILITOT 0.7   ALKPHOS 64   AST 19   ALT 20   ANIONGAP 11   EGFRNONAA >60.0       Significant Imaging: I have reviewed all pertinent imaging results/findings within the past 24 hours.    Assessment/Plan:     * NSTEMI (non-ST elevated myocardial infarction)  Serial cardiac enzymes  NPO  Cardiology Consult   Start Heparin gtt  Check urine drug screen      CAD (coronary artery disease)  Per Chart Review :CAD s/p PCI with stents in 2014 and 2017 ([facility])          Non-compliance  Exactly 1 year ago came to ER with chest pain and was evaluated by Cardiologist in ER Room  Rejected [facility] at that point and said he will follow up with Cardio MD , But never did  Also Left AMA from ER       Alcohol use  Drinks Whisky everyday  Monitor for ETOH withdrawal       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  Stable       Smoker  Aware       VTE Risk Mitigation (From admission, onward)         Ordered     heparin 25,000 units in dextrose 5% (100 units/ml) IV bolus from bag - ADDITIONAL PRN BOLUS - 60 units/kg (max bolus 4000 units)  As needed (PRN)        Question:  Angiotensin II Infusion Adjustment (DO NOT MODIFY ANSWER)  Answer:  \[email or web address]\epic\Images\Pharmacy\HeparinInfusions\heparin LOW INTENSITY nomogram for [facility] [id number].pdf    01/28/22 0323     heparin 25,000 units in dextrose 5% (100 units/ml) IV bolus from bag - ADDITIONAL PRN BOLUS - 30 units/kg (max bolus 4000 units)  As needed (PRN)        Question:  Angiotensin II Infusion Adjustment (DO NOT MODIFY ANSWER)  Answer:  \\[email or web address]\epic\Images\Pharmacy\HeparinInfusions\heparin LOW INTENSITY  nomogram for [facility] [id number].pdf    01/28/22 0323     heparin 25,000 units in dextrose 5% (100 units/ml) IV bolus from bag INITIAL BOLUS (max bolus 4000 units)  Once        Question:  Angiotensin II Infusion Adjustment (DO NOT MODIFY ANSWER)  Answer:  \\[email or web address]\epic\Images\Pharmacy\HeparinInfusions\heparin LOW INTENSITY nomogram for [facility] [id number].pdf    01/28/22 0323     heparin 25,000 units in dextrose 5% 250 mL (100 units/mL) infusion LOW INTENSITY nomogram - [facility]  Continuous        Question Answer Comment   Angiotensin II Infusion Adjustment (DO NOT MODIFY ANSWER) \\[email or web address]\epic\Images\Pharmacy\HeparinInfusions\heparin LOW INTENSITY nomogram for [facility] [id number].pdf    Begin at (in units/kg/hr) 12        01/28/22 0323     IP VTE HIGH RISK PATIENT  Once         01/28/22 0107     Place sequential compression device  Until discontinued         01/28/22 0107                   David Kim MD  Department of Hospital Medicine   [facility]

## 2022-01-28 NOTE — NURSING
Report called to primary RN on cardiology, RM 2520. Pt transferred back to room. VSS/NADN at this time. Aggrastat & NS infusing. Dressing to right groin CDI. No hematoma/ swelling present.

## 2022-01-28 NOTE — ED PROVIDER NOTES
"Encounter Date: 1/27/2022       History     Chief Complaint   Patient presents with    Chest Pain     "Chest pain for last three hours"      HPI   61-year-old male with a past medical history hypertension, CAD status post stent placement and nodular basal cell carcinoma of the right face.  Presents emergency department with left-sided chest pain that started at 8:00 p.m. tonight while at rest also endorses radiation to the left upper arm and nausea.  Denies fever, chills, vomiting, abdominal pain, melena, hematochezia.  Prior to arrival EMS gave nitro spray and 325 of aspirin.  Review of patient's allergies indicates:  No Known Allergies  Past Medical History:   Diagnosis Date    CAD (coronary artery disease)     Ethanolism     Heart attack     Hypertension     Smoker      Past Surgical History:   Procedure Laterality Date    FACIAL COSMETIC SURGERY      PERCUTANEOUS CORONARY INTERVENTION (PCI) FOR CHRONIC TOTAL OCCLUSION OF CORONARY ARTERY       Family History   Problem Relation Age of Onset    Heart disease Father      Social History     Tobacco Use    Smoking status: Current Every Day Smoker     Packs/day: 1.00   Substance Use Topics    Alcohol use: Yes     Comment: 1/4 bottle whiskey every day    Drug use: Yes     Types: Marijuana     Review of Systems   Constitutional: Negative for chills and fever.   HENT: Negative for congestion and sore throat.    Eyes: Negative for discharge and redness.   Respiratory: Negative for cough and shortness of breath.    Cardiovascular: Positive for chest pain. Negative for leg swelling.   Gastrointestinal: Positive for nausea. Negative for abdominal pain and vomiting.   Genitourinary: Negative for dysuria and hematuria.   Musculoskeletal: Negative for back pain and neck pain.   Skin: Negative for rash.   Neurological: Negative for seizures, weakness and light-headedness.   Hematological: Does not bruise/bleed easily.       Physical Exam     Initial Vitals [01/27/22 " 2145]   BP Pulse Resp Temp SpO2   (!) 166/93 72 15 97.6 °F (36.4 °C) 95 %      MAP       --         Physical Exam    Constitutional: No distress.   HENT:   Head: Normocephalic and atraumatic.   Right Ear: External ear normal.   Left Ear: External ear normal.   Mouth/Throat: No oropharyngeal exudate.   Eyes: EOM are normal. Pupils are equal, round, and reactive to light. No scleral icterus.   Neck: Neck supple. No JVD present.   Normal range of motion.  Cardiovascular: Normal rate.   No murmur heard.  Pulmonary/Chest: Breath sounds normal. He has no wheezes. He has no rhonchi.   Lungs clear to auscultation bilaterally.  No reproducible chest wall tenderness palpation   Abdominal: Abdomen is soft. Bowel sounds are normal. He exhibits no distension. There is no abdominal tenderness.   Normal bowel sounds, soft, nontender, nondistended.   Musculoskeletal:         General: Normal range of motion.      Cervical back: Normal range of motion and neck supple.      Comments: No lower extremity     Neurological: He is alert and oriented to person, place, and time. He has normal strength. No cranial nerve deficit. GCS score is 15. GCS eye subscore is 4. GCS verbal subscore is 5. GCS motor subscore is 6.   Skin: Skin is warm and dry. No rash noted.         ED Course   Procedures  Labs Reviewed   CBC W/ AUTO DIFFERENTIAL - Abnormal; Notable for the following components:       Result Value    MCV 99 (*)     MCH 32.7 (*)     Gran % 76.5 (*)     Lymph % 12.9 (*)     All other components within normal limits   COMPREHENSIVE METABOLIC PANEL   TROPONIN I   B-TYPE NATRIURETIC PEPTIDE   SARS-COV-2 RNA AMPLIFICATION, QUAL   DRUG SCREEN PANEL, URINE EMERGENCY          Imaging Results          CTA Chest Non-Coronary (PE Study) (Final result)  Result time 01/27/22 23:26:54    Final result by Krzysztof Dan MD (01/27/22 23:26:54)                 Narrative:    CLINICAL INDICATION: chest pain and sob. .    TECHNIQUE: CT arteriography was  obtained of the chest with multiplanar MIP and/or 3-D angiographic reconstructions.  This exam was performed according to our departmental dose-optimization program, which includes automated exposure control, adjustment of the mA and/or kV according to patient size and/or use of iterative reconstruction techniques.    COMPARISON: None available.    CORRELATION: Chest radiography January 27, 2022.    FINDINGS: Imaging is contrast bolus. Average Hounsfield unit measurement within main pulmonary artery segment of 232. Artifact from venous opacification.    There is no evidence of pulmonary embolus. Thoracic aorta is of normal caliber.    The heart is prominent with coronary calcification.  No pericardial effusion.  No bulky mediastinal adenopathy.    The lungs are grossly clear.  No consolidation or edema.  No effusion or pneumothorax.  Chronic parenchymal lung change.    Visualized abdominal contents demonstrate a small presumed cyst left lobe of the liver..    Visualized bones are unremarkable.    IMPRESSION:  No evidence of pulmonary embolus.    Lungs grossly clear.. Chronic change  .    Electronically signed by:  Krzysztof Dan MD  1/27/2022 11:26 PM CST Workstation: [id number]                             X-Ray Chest AP Portable (Final result)  Result time 01/27/22 23:27:55    Final result by Krzysztof Dan MD (01/27/22 23:27:55)                 Narrative:    CLINICAL INDICATION: Chest pain.    TECHNIQUE: A single portable AP  view was obtained of the chest at 2217 hours.    COMPARISON: January 26, 2021.    FINDINGS: The cardiomediastinal  silhouette is prominent but stable. The lungs Definite mild progressive patchy airspace disease left base.. No significant pleural fluid. No pneumothorax. The visualized bones are unremarkable.    IMPRESSION: Progressive patchy alveolar space disease particularly left base. Presumed infectious inflammatory.    Electronically signed by:  Krzysztof Dan MD  1/27/2022 11:27 PM CST  Workstation: [id number]                               Medications   nitroGLYCERIN SL tablet 0.4 mg (0.4 mg Sublingual Given 1/27/22 2154)   ondansetron injection 4 mg (4 mg Intravenous Given 1/27/22 2154)   iohexoL (OMNIPAQUE 350) injection 100 mL (100 mLs Intravenous Given 1/27/22 2305)                        MDM:  61-year-old male with a past medical history hypertension, CAD status post stent placement and nodular basal cell carcinoma of the right face.  Presents emergency department with left-sided chest pain that started at 8:00 p.m. tonight while at rest also endorses radiation to the left upper arm and nausea.  Denies fever, chills, vomiting, abdominal pain, melena, hematochezia.  Vital signs stable.  Lungs clear auscultation bilaterally, no reproducible chest wall tenderness palpation of lower extremity edema.  Has a soft, nontender, nondistended.  Cardiac workup ordered.  CT PE study ordered.  Sublingual nitroglycerin given for chest pain.  Will reassess.    Hang Castaneda MD  [employer] EM PGY4  1/27/2022 2158    Update:  Labs unremarkable. CXR neg. CTPE neg. ECG showed no signs of ischemia. Pain improved with nitroglycerin tablet. Will admit patient to hospitalist for high risk chest pain.     Clinical Impression:   Final diagnoses:  [R07.9] Chest pain  [R07.9] Chest pain          ED Disposition Condition    Admit               Hang Castaneda MD  Resident  01/28/22 0024

## 2022-01-28 NOTE — CONSULTS
[facility]  Cardiology  Consult Note    Patient Name: Stuart Adler  MRN: [id number]  Admission Date: 1/27/2022  Hospital Length of Stay: 0 days  Code Status: Full Code   Attending Provider: Crescencio Mora MD   Consulting Provider: Nacho Orellana MD  Primary Care Physician: Primary Doctor No  Principal Problem:NSTEMI (non-ST elevated myocardial infarction)    Patient information was obtained from patient and ER records.     Inpatient consult to Cardiology  Consult performed by: Nacho Orellana MD  Consult ordered by: David Kim MD  Reason for consult: NSTEMI        Subjective:     61-year-old male admitted with chest pain.  Initial troponin was negative and titrated to positive.  EKG shows small Q-waves inferiorly.  No chest pain currently.    Past Medical History:   Diagnosis Date    CAD (coronary artery disease)     Ethanolism     Heart attack     Hypertension     Smoker        Past Surgical History:   Procedure Laterality Date    FACIAL COSMETIC SURGERY      PERCUTANEOUS CORONARY INTERVENTION (PCI) FOR CHRONIC TOTAL OCCLUSION OF CORONARY ARTERY         Review of patient's allergies indicates:  No Known Allergies    No current facility-administered medications on file prior to encounter.     Current Outpatient Medications on File Prior to Encounter   Medication Sig    amLODIPine (NORVASC) 5 MG tablet Take 5 mg by mouth once daily.     aspirin 81 MG Chew Take 81 mg by mouth once daily.     clopidogreL (PLAVIX) 75 mg tablet Take 75 mg by mouth once daily.     Family History     Problem Relation (Age of Onset)    Heart disease Father        Tobacco Use    Smoking status: Current Every Day Smoker     Packs/day: 1.00    Smokeless tobacco: Not on file   Substance and Sexual Activity    Alcohol use: Yes     Comment: 1/4 bottle whiskey every day    Drug use: Yes     Types: Marijuana    Sexual activity: Not on file     Review of Systems   All other systems reviewed and are  negative.    Objective:     Vital Signs (Most Recent):  Temp: 98.3 °F (36.8 °C) (01/27/22 2209)  Pulse: 73 (01/28/22 0303)  Resp: 18 (01/28/22 0212)  BP: (!) 164/84 (01/28/22 0212)  SpO2: 96 % (01/28/22 0212) Vital Signs (24h Range):  Temp:  [97.6 °F (36.4 °C)-98.3 °F (36.8 °C)] 98.3 °F (36.8 °C)  Pulse:  [70-80] 73  Resp:  [14-18] 18  SpO2:  [95 %-98 %] 96 %  BP: (153-166)/(84-95) 164/84     Weight: 125 kg (275 lb 9.2 oz)  Body mass index is 35.38 kg/m².    SpO2: 96 %  O2 Device (Oxygen Therapy): room air    No intake or output data in the 24 hours ending 01/28/22 1025    Lines/Drains/Airways     Peripheral Intravenous Line                 Peripheral IV - Single Lumen 01/27/22 2202 18 G Left Hand <1 day                Physical Exam  Vitals reviewed.   Constitutional:       Appearance: Normal appearance.   HENT:      Mouth/Throat:      Mouth: Mucous membranes are moist.   Cardiovascular:      Rate and Rhythm: Normal rate and regular rhythm.      Heart sounds: No murmur heard.  No gallop.    Pulmonary:      Effort: Pulmonary effort is normal.      Breath sounds: Normal breath sounds.   Abdominal:      General: Abdomen is flat.      Palpations: Abdomen is soft.   Musculoskeletal:      Cervical back: Normal range of motion.   Skin:     General: Skin is warm and dry.   Neurological:      General: No focal deficit present.      Mental Status: He is alert and oriented to person, place, and time.   Psychiatric:         Mood and Affect: Mood normal.         Significant Labs:   BMP:   Recent Labs   Lab 01/27/22 2204 01/28/22  0455    101    139   K 4.1 3.9    103   CO2 25 27   BUN 16 16   CREATININE 1.0 0.9   CALCIUM 8.9 8.5*   , CBC   Recent Labs   Lab 01/27/22 2204 01/27/22 2204 01/28/22  0455   WBC 7.89  --  6.45   HGB 15.4  --  15.2   HCT 46.6   < > 46.4     --  173    < > = values in this interval not displayed.    and Troponin   Recent Labs   Lab 01/27/22  2204 01/28/22  0110 01/28/22  5352    TROPONINI 0.031 0.296* 0.856*       Significant Imaging: Echocardiogram:   Transthoracic echo (TTE) complete (Cupid Only):   Results for orders placed or performed during the hospital encounter of 01/26/21   Echo Color Flow Doppler? Yes   Result Value Ref Range    BSA 2.33 m2    TDI SEPTAL 0.06 m/s    LV LATERAL E/E' RATIO 7.11 m/s    LV SEPTAL E/E' RATIO 10.67 m/s    AORTIC VALVE CUSP SEPERATION 2.36 cm    TDI LATERAL 0.09 m/s    PV PEAK VELOCITY 66.00 cm/s    LVIDd 4.25 3.5 - 6.0 cm    IVS 1.11 (A) 0.6 - 1.1 cm    Posterior Wall 1.12 (A) 0.6 - 1.1 cm    Ao root annulus 4.05 cm    LVIDs 2.78 2.1 - 4.0 cm    FS 35 28 - 44 %    LV mass 163.12 g    RVDD 301.00 cm    Left Ventricle Relative Wall Thickness 0.53 cm    AV mean gradient 3 mmHg    AV valve area 4.96 cm2    AV Velocity Ratio 97.43     AV index (prosthetic) 1.03     E/A ratio 0.96     Mean e' 0.08 m/s    E wave deceleration time 230.29 msec    IVRT 91.17 msec    LVOT diameter 2.47 cm    LVOT area 4.8 cm2    LVOT peak gunnar 109.12 m/s    LVOT peak VTI 22.25 cm    Ao peak gunnar 1.12 m/s    Ao VTI 21.50 cm    LVOT stroke volume 106.56 cm3    AV peak gradient 5 mmHg    E/E' ratio 8.53 m/s    MV Peak E Gunnar 0.64 m/s    TR Max Gunnar 3.01 m/s    MV Peak A Gunnar 0.67 m/s    LV Mass Index 71 g/m2    Triscuspid Valve Regurgitation Peak Gradient 36 mmHg    Narrative    · The left ventricle is normal in size with concentric remodeling and   normal systolic function. The estimated ejection fraction is 70%  · Indeterminate left ventricular diastolic function.  · Normal right ventricular size with normal right ventricular systolic   function.  · The aortic root is mildly dilated.  · There is mild pulmonary hypertension.        Assessment and Plan:   61-year-old male with history of coronary artery disease status post PCI to RCA in the past here with chest pain and NSTEMI.  After discussion of risk and benefits with the patient will proceed with left heart catheterization plus  minus PCI.  Discussed extensively with patient regarding compliance of aspirin and Plavix if he gets a stent.  Informed him that if he does not take his antiplatelet therapy he would developer large MI.  Patient verbalized understanding and ensures us that he will take his dual antiplatelet therapy.  Advised him tobacco cessation.    Active Diagnoses:    Diagnosis Date Noted POA    PRINCIPAL PROBLEM:  NSTEMI (non-ST elevated myocardial infarction) [I21.4] 01/28/2022 Unknown    Non-compliance [Z91.19] 01/26/2021 Not Applicable     Chronic    Facial basal cell cancer [C44.310] 01/26/2021 Yes     Chronic    CAD (coronary artery disease) [I25.10] 01/26/2021 Yes     Chronic    Smoker [F17.200] 01/26/2021 Yes     Chronic    Alcohol use [Z72.89] 01/26/2021 Yes     Chronic      Problems Resolved During this Admission:       VTE Risk Mitigation (From admission, onward)         Ordered     heparin 25,000 units in dextrose 5% (100 units/ml) IV bolus from bag - ADDITIONAL PRN BOLUS - 60 units/kg (max bolus 4000 units)  As needed (PRN)        Question:  Angiotensin II Infusion Adjustment (DO NOT MODIFY ANSWER)  Answer:  \[email or web address]\epic\Images\Pharmacy\HeparinInfusions\heparin LOW INTENSITY nomogram for [facility] [id number].pdf    01/28/22 0323     heparin 25,000 units in dextrose 5% (100 units/ml) IV bolus from bag - ADDITIONAL PRN BOLUS - 30 units/kg (max bolus 4000 units)  As needed (PRN)        Question:  Angiotensin II Infusion Adjustment (DO NOT MODIFY ANSWER)  Answer:  \[email or web address]\epic\Images\Pharmacy\HeparinInfusions\heparin LOW INTENSITY nomogram for [facility] [id number].pdf    01/28/22 0323     heparin 25,000 units in dextrose 5% 250 mL (100 units/mL) infusion LOW INTENSITY nomogram - [facility]  Continuous        Question Answer Comment   Angiotensin II Infusion Adjustment (DO NOT MODIFY ANSWER) \[email or web address]\epic\Images\Pharmacy\HeparinInfusions\heparin LOW INTENSITY nomogram for [facility] [email or web address]    Begin at (in units/kg/hr) 12         01/28/22 0323     IP VTE HIGH RISK PATIENT  Once         01/28/22 0107     Place sequential compression device  Until discontinued         01/28/22 0107                Thank you for your consult. I will follow-up with patient. Please contact us if you have any additional questions.    Post catheterization addendum.  Patient's angiogram showed patient's patent RCA stents.  He has severe mid left circumflex stenosis which was treated with 1 drug-eluting stent.  Lad was not obstructive.  He will be on dual antiplatelet therapy for at least 1 year.    Nacho Orellana MD  Cardiology   [facility]

## 2022-01-28 NOTE — PROGRESS NOTES
[facility] Medicine  Progress Note    Patient Name: Stuart Adler  MRN: [id number]  Patient Class: IP- Inpatient   Admission Date: 1/27/2022  Length of Stay: 0 days  Attending Physician: Crescencio Mora MD  Primary Care Provider: Primary Doctor No        Subjective:     Principal Problem:NSTEMI (non-ST elevated myocardial infarction)        HPI:  61 year old patient getting admitted with NSTEMI  Pt started having L sided chest pain all of sudden while at rest  Describes as severe chest pain with radiation to neck and LUE  Associated with mild nausea and came to ER  Pt was given NG and got admitted       Overview/Hospital Course:  Seen in heart center   No CP . Cath site clear   Got BROOKE in Cx       Interval History:    Review of Systems  Objective:     Vital Signs (Most Recent):  Temp: 97.6 °F (36.4 °C) (01/28/22 1415)  Pulse: 64 (01/28/22 1415)  Resp: 20 (01/28/22 1415)  BP: (!) 169/94 (01/28/22 1415)  SpO2: (!) 94 % (01/28/22 1415) Vital Signs (24h Range):  Temp:  [97.6 °F (36.4 °C)-98.3 °F (36.8 °C)] 97.6 °F (36.4 °C)  Pulse:  [62-80] 64  Resp:  [13-20] 20  SpO2:  [94 %-98 %] 94 %  BP: (147-187)/() 169/94     Weight: 125 kg (275 lb 9.2 oz)  Body mass index is 35.38 kg/m².    Intake/Output Summary (Last 24 hours) at 1/28/2022 1518  Last data filed at 1/28/2022 1400  Gross per 24 hour   Intake --   Output 1175 ml   Net -1175 ml      Physical Exam  Constitutional:       General: He is not in acute distress.     Appearance: He is well-developed.   HENT:      Head: Normocephalic.      Nose: Nose normal.   Eyes:      Extraocular Movements: EOM normal.      Pupils: Pupils are equal, round, and reactive to light.   Cardiovascular:      Rate and Rhythm: Normal rate and regular rhythm.      Pulses: Normal pulses.   Pulmonary:      Effort: No respiratory distress.   Abdominal:      General: Abdomen is flat. There is no distension.      Tenderness: There is no abdominal tenderness.    Musculoskeletal:         General: Normal range of motion.      Cervical back: Neck supple.   Skin:     Findings: No rash.   Neurological:      General: No focal deficit present.      Mental Status: He is alert and oriented to person, place, and time.         Significant Labs:   All pertinent labs within the past 24 hours have been reviewed.  Coagulation:   Recent Labs   Lab 01/28/22  0455   INR 1.1   APTT 27.7     Lactic Acid: No results for input(s): LACTATE in the last 48 hours.  Lipase: No results for input(s): LIPASE in the last 48 hours.  Lipid Panel: No results for input(s): CHOL, HDL, LDLCALC, TRIG, CHOLHDL in the last 48 hours.  Magnesium: No results for input(s): MG in the last 48 hours.    Significant Imaging: I have reviewed all pertinent imaging results/findings within the past 24 hours.      Assessment/Plan:      * NSTEMI (non-ST elevated myocardial infarction)  S/p PCI at circumflex   Post op care   tirofiban as per protocol  Asa and plavix and statin       CAD (coronary artery disease)  :CAD s/p PCI with stents in 2014 and 2017 ([facility])          Non-compliance  Recommended [facility] in the past and  Declined and never follow up and AMA        Alcohol use  Drinks Whisky everyday  Monitor for ETOH withdrawal       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 s/p graft   Stable       Smoker  Aware       VTE Risk Mitigation (From admission, onward)         Ordered     IP VTE HIGH RISK PATIENT  Once         01/28/22 0107     Place sequential compression device  Until discontinued         01/28/22 0107                Discharge Planning   CLARKE: 1/30/2022     Code Status: Full Code   Is the patient medically ready for discharge?:     Reason for patient still in hospital (select all that apply): Treatment  Discharge Plan A: Home                  Crescencio Mora MD  Department of Hospital Medicine   [facility]

## 2022-01-28 NOTE — PLAN OF CARE
Problem: Adult Inpatient Plan of Care  Goal: Plan of Care Review  Outcome: Ongoing, Progressing  Goal: Patient-Specific Goal (Individualized)  Outcome: Ongoing, Progressing  Goal: Absence of Hospital-Acquired Illness or Injury  Outcome: Ongoing, Progressing  Goal: Optimal Comfort and Wellbeing  Outcome: Ongoing, Progressing  Goal: Readiness for Transition of Care  Outcome: Ongoing, Progressing     Problem: Fall Injury Risk  Goal: Absence of Fall and Fall-Related Injury  Outcome: Ongoing, Progressing     Problem: Chest Pain  Goal: Resolution of Chest Pain Symptoms  Outcome: Ongoing, Progressing

## 2022-01-28 NOTE — HPI
61 year old patient getting admitted with NSTEMI  Pt started having L sided chest pain all of sudden while at rest  Describes as severe chest pain with radiation to neck and LUE  Associated with mild nausea and came to ER  Pt was given NG and got admitted

## 2022-01-28 NOTE — NURSING
Patient is back from heart [place]. Patient ha dressing at puncture site that on right radial and right groin is clean dry and intact. Breathing is even and non labored. Radial and DP pulses 2+. Patient is NAD. Alert and oriented. No c/o pain.

## 2022-01-28 NOTE — SUBJECTIVE & OBJECTIVE
Interval History:    Review of Systems  Objective:     Vital Signs (Most Recent):  Temp: 97.6 °F (36.4 °C) (01/28/22 1415)  Pulse: 64 (01/28/22 1415)  Resp: 20 (01/28/22 1415)  BP: (!) 169/94 (01/28/22 1415)  SpO2: (!) 94 % (01/28/22 1415) Vital Signs (24h Range):  Temp:  [97.6 °F (36.4 °C)-98.3 °F (36.8 °C)] 97.6 °F (36.4 °C)  Pulse:  [62-80] 64  Resp:  [13-20] 20  SpO2:  [94 %-98 %] 94 %  BP: (147-187)/() 169/94     Weight: 125 kg (275 lb 9.2 oz)  Body mass index is 35.38 kg/m².    Intake/Output Summary (Last 24 hours) at 1/28/2022 1518  Last data filed at 1/28/2022 1400  Gross per 24 hour   Intake --   Output 1175 ml   Net -1175 ml      Physical Exam  Constitutional:       General: He is not in acute distress.     Appearance: He is well-developed.   HENT:      Head: Normocephalic.      Nose: Nose normal.   Eyes:      Extraocular Movements: EOM normal.      Pupils: Pupils are equal, round, and reactive to light.   Cardiovascular:      Rate and Rhythm: Normal rate and regular rhythm.      Pulses: Normal pulses.   Pulmonary:      Effort: No respiratory distress.   Abdominal:      General: Abdomen is flat. There is no distension.      Tenderness: There is no abdominal tenderness.   Musculoskeletal:         General: Normal range of motion.      Cervical back: Neck supple.   Skin:     Findings: No rash.   Neurological:      General: No focal deficit present.      Mental Status: He is alert and oriented to person, place, and time.         Significant Labs:   All pertinent labs within the past 24 hours have been reviewed.  Coagulation:   Recent Labs   Lab 01/28/22  0455   INR 1.1   APTT 27.7     Lactic Acid: No results for input(s): LACTATE in the last 48 hours.  Lipase: No results for input(s): LIPASE in the last 48 hours.  Lipid Panel: No results for input(s): CHOL, HDL, LDLCALC, TRIG, CHOLHDL in the last 48 hours.  Magnesium: No results for input(s): MG in the last 48 hours.    Significant Imaging: I have  reviewed all pertinent imaging results/findings within the past 24 hours.

## 2022-01-28 NOTE — PLAN OF CARE
"[facility]  Initial Discharge Assessment       Primary Care Provider: Primary Doctor No    Admission Diagnosis: Chest pain [R07.9]  NSTEMI (non-ST elevated myocardial infarction) [I21.4]    Admission Date: 1/27/2022  Expected Discharge Date: 1/30/2022    Discharge Barriers Identified: None    Payor: MEDICARE / Plan: MEDICARE PART A & B / Product Type: Government /     Extended Emergency Contact Information  Primary Emergency Contact: kaylan freire  Mobile Phone: [phone]  Relation: Daughter   needed? No    Discharge Plan A: Home  Discharge Plan B: Home    No Pharmacies Listed    Initial Assessment (most recent)     Adult Discharge Assessment - 01/28/22 1500        Discharge Assessment    Assessment Type Discharge Planning Assessment     Confirmed/corrected address, phone number and insurance Yes     Confirmed Demographics Correct on Facesheet     Source of Information patient     When was your last doctors appointment? --   "a couple of years ago"    Does patient/caregiver understand observation status Yes     Communicated CLARKE with patient/caregiver Date not available/Unable to determine     Reason For Admission NSTEMI     Lives With friend(s)     Do you expect to return to your current living situation? Yes     Do you have help at home or someone to help you manage your care at home? No     Prior to hospitilization cognitive status: Alert/Oriented     Current cognitive status: Alert/Oriented     Walking or Climbing Stairs Difficulty none     Dressing/Bathing Difficulty none     Equipment Currently Used at Home none     Readmission within 30 days? No     Patient currently being followed by outpatient case management? No     Do you currently have service(s) that help you manage your care at home? No     Do you take prescription medications? No     Do you have prescription coverage? Yes     Coverage Medicare     Do you have any problems affording any of your prescribed medications? TBD     " Who is going to help you get home at discharge? step-daughter George Rivero     How do you get to doctors appointments? car, drives self;family or friend will provide     Are you on dialysis? No     Do you take coumadin? No     Discharge Plan A Home     Discharge Plan B Home     DME Needed Upon Discharge  none     Discharge Plan discussed with: Patient     Discharge Barriers Identified None        Relationship/Environment    Name(s) of Who Lives With Patient daughter figure, son figure, and 3 small children

## 2022-01-28 NOTE — ASSESSMENT & PLAN NOTE
Exactly 1 year ago came to ER with chest pain and was evaluated by Cardiologist in ER Room  Rejected [facility] at that point and said he will follow up with Cardio MD , But never did  Also Left AMA from ER

## 2022-01-28 NOTE — HOSPITAL COURSE
HPI: 61 year old patient getting admitted with NSTEMI  Pt started having L sided chest pain all of sudden while at rest  Describes as severe chest pain with radiation to neck and LUE  Associated with mild nausea and came to ER  Pt was given NG and got admitted     Hospital course  Patient was admitted with chest pain and NSTEMI.  LHC  was done and needed PCI in RCA.  Later is patient was chest pain free and discharged in stable condition and continued  dual platelet therapy, statin and beta-blocker and ACE-inhibitor.  He  was discussed about the compliance and cardiology appointment given to weeks.

## 2022-01-28 NOTE — SUBJECTIVE & OBJECTIVE
Past Medical History:   Diagnosis Date    CAD (coronary artery disease)     Ethanolism     Heart attack     Hypertension     Smoker        Past Surgical History:   Procedure Laterality Date    FACIAL COSMETIC SURGERY      PERCUTANEOUS CORONARY INTERVENTION (PCI) FOR CHRONIC TOTAL OCCLUSION OF CORONARY ARTERY         Review of patient's allergies indicates:  No Known Allergies    No current facility-administered medications on file prior to encounter.     Current Outpatient Medications on File Prior to Encounter   Medication Sig    amLODIPine (NORVASC) 5 MG tablet Take 5 mg by mouth once daily.     aspirin 81 MG Chew Take 81 mg by mouth once daily.     clopidogreL (PLAVIX) 75 mg tablet Take 75 mg by mouth once daily.     Family History     Problem Relation (Age of Onset)    Heart disease Father        Tobacco Use    Smoking status: Current Every Day Smoker     Packs/day: 1.00    Smokeless tobacco: Not on file   Substance and Sexual Activity    Alcohol use: Yes     Comment: 1/4 bottle whiskey every day    Drug use: Yes     Types: Marijuana    Sexual activity: Not on file     Review of Systems   Constitutional: Negative for activity change and appetite change.   HENT: Negative for congestion and dental problem.    Eyes: Negative for discharge and itching.   Respiratory: Negative for shortness of breath.    Cardiovascular: Positive for chest pain.   Gastrointestinal: Negative for abdominal distention and abdominal pain.   Endocrine: Negative for cold intolerance.   Genitourinary: Negative for difficulty urinating and dysuria.   Musculoskeletal: Negative for arthralgias and back pain.   Skin: Negative for color change.   Neurological: Negative for dizziness and facial asymmetry.   Hematological: Negative for adenopathy.   Psychiatric/Behavioral: Negative for agitation and behavioral problems.     Objective:     Vital Signs (Most Recent):  Temp: 98.3 °F (36.8 °C) (01/27/22 2209)  Pulse: 74 (01/28/22  0212)  Resp: 18 (01/28/22 0212)  BP: (!) 164/84 (01/28/22 0212)  SpO2: 96 % (01/28/22 0212) Vital Signs (24h Range):  Temp:  [97.6 °F (36.4 °C)-98.3 °F (36.8 °C)] 98.3 °F (36.8 °C)  Pulse:  [70-80] 74  Resp:  [14-18] 18  SpO2:  [95 %-98 %] 96 %  BP: (153-166)/(84-95) 164/84     Weight: 125 kg (275 lb 9.2 oz)  Body mass index is 35.38 kg/m².    Physical Exam  Vitals and nursing note reviewed.   Constitutional:       Appearance: He is well-developed.   HENT:      Head: Atraumatic.      Right Ear: External ear normal.      Left Ear: External ear normal.      Nose: Nose normal.      Mouth/Throat:      Mouth: Mucous membranes are moist.   Eyes:      General: No scleral icterus.  Cardiovascular:      Rate and Rhythm: Normal rate.   Pulmonary:      Effort: Pulmonary effort is normal.   Abdominal:      General: There is no distension.   Musculoskeletal:         General: Normal range of motion.      Cervical back: Full passive range of motion without pain and normal range of motion.   Skin:     General: Skin is warm.      Comments: basal cell cancer R face area    Neurological:      Mental Status: He is alert and oriented to person, place, and time.   Psychiatric:         Behavior: Behavior normal.             Significant Labs:   All pertinent labs within the past 24 hours have been reviewed.  CBC:   Recent Labs   Lab 01/27/22  2204   WBC 7.89   HGB 15.4   HCT 46.6        CMP:   Recent Labs   Lab 01/27/22  2204      K 4.1      CO2 25      BUN 16   CREATININE 1.0   CALCIUM 8.9   PROT 7.2   ALBUMIN 3.7   BILITOT 0.7   ALKPHOS 64   AST 19   ALT 20   ANIONGAP 11   EGFRNONAA >60.0       Significant Imaging: I have reviewed all pertinent imaging results/findings within the past 24 hours.

## 2022-01-29 VITALS
WEIGHT: 275.56 LBS | HEART RATE: 99 BPM | DIASTOLIC BLOOD PRESSURE: 95 MMHG | RESPIRATION RATE: 19 BRPM | OXYGEN SATURATION: 94 % | SYSTOLIC BLOOD PRESSURE: 151 MMHG | HEIGHT: 74 IN | BODY MASS INDEX: 35.36 KG/M2 | TEMPERATURE: 98 F

## 2022-01-29 LAB
ALBUMIN SERPL BCP-MCNC: 3.5 G/DL (ref 3.5–5.2)
ALP SERPL-CCNC: 60 U/L (ref 55–135)
ALT SERPL W/O P-5'-P-CCNC: 18 U/L (ref 10–44)
ANION GAP SERPL CALC-SCNC: 8 MMOL/L (ref 8–16)
APTT PPP: 28.6 SEC (ref 23.3–35.1)
AST SERPL-CCNC: 20 U/L (ref 10–40)
BASOPHILS # BLD AUTO: 0.03 K/UL (ref 0–0.2)
BASOPHILS NFR BLD: 0.4 % (ref 0–1.9)
BILIRUB SERPL-MCNC: 0.9 MG/DL (ref 0.1–1)
BUN SERPL-MCNC: 13 MG/DL (ref 8–23)
CALCIUM SERPL-MCNC: 8.6 MG/DL (ref 8.7–10.5)
CHLORIDE SERPL-SCNC: 101 MMOL/L (ref 95–110)
CO2 SERPL-SCNC: 28 MMOL/L (ref 23–29)
CREAT SERPL-MCNC: 0.8 MG/DL (ref 0.5–1.4)
DIFFERENTIAL METHOD: ABNORMAL
EOSINOPHIL # BLD AUTO: 0.2 K/UL (ref 0–0.5)
EOSINOPHIL NFR BLD: 2.1 % (ref 0–8)
ERYTHROCYTE [DISTWIDTH] IN BLOOD BY AUTOMATED COUNT: 12.6 % (ref 11.5–14.5)
ERYTHROCYTE [DISTWIDTH] IN BLOOD BY AUTOMATED COUNT: 12.6 % (ref 11.5–14.5)
EST. GFR  (AFRICAN AMERICAN): >60 ML/MIN/1.73 M^2
EST. GFR  (NON AFRICAN AMERICAN): >60 ML/MIN/1.73 M^2
GLUCOSE SERPL-MCNC: 107 MG/DL (ref 70–110)
HCT VFR BLD AUTO: 46.5 % (ref 40–54)
HCT VFR BLD AUTO: 46.5 % (ref 40–54)
HGB BLD-MCNC: 15.5 G/DL (ref 14–18)
HGB BLD-MCNC: 15.5 G/DL (ref 14–18)
IMM GRANULOCYTES # BLD AUTO: 0.02 K/UL (ref 0–0.04)
IMM GRANULOCYTES NFR BLD AUTO: 0.3 % (ref 0–0.5)
LYMPHOCYTES # BLD AUTO: 0.9 K/UL (ref 1–4.8)
LYMPHOCYTES NFR BLD: 12.5 % (ref 18–48)
MCH RBC QN AUTO: 33.4 PG (ref 27–31)
MCH RBC QN AUTO: 33.4 PG (ref 27–31)
MCHC RBC AUTO-ENTMCNC: 33.3 G/DL (ref 32–36)
MCHC RBC AUTO-ENTMCNC: 33.3 G/DL (ref 32–36)
MCV RBC AUTO: 100 FL (ref 82–98)
MCV RBC AUTO: 100 FL (ref 82–98)
MONOCYTES # BLD AUTO: 0.7 K/UL (ref 0.3–1)
MONOCYTES NFR BLD: 9.2 % (ref 4–15)
NEUTROPHILS # BLD AUTO: 5.3 K/UL (ref 1.8–7.7)
NEUTROPHILS NFR BLD: 75.5 % (ref 38–73)
NRBC BLD-RTO: 0 /100 WBC
PLATELET # BLD AUTO: 171 K/UL (ref 150–450)
PLATELET # BLD AUTO: 171 K/UL (ref 150–450)
PMV BLD AUTO: 10.1 FL (ref 9.2–12.9)
PMV BLD AUTO: 10.1 FL (ref 9.2–12.9)
POTASSIUM SERPL-SCNC: 3.9 MMOL/L (ref 3.5–5.1)
PROT SERPL-MCNC: 6.7 G/DL (ref 6–8.4)
RBC # BLD AUTO: 4.64 M/UL (ref 4.6–6.2)
RBC # BLD AUTO: 4.64 M/UL (ref 4.6–6.2)
SODIUM SERPL-SCNC: 137 MMOL/L (ref 136–145)
WBC # BLD AUTO: 7.03 K/UL (ref 3.9–12.7)
WBC # BLD AUTO: 7.03 K/UL (ref 3.9–12.7)

## 2022-01-29 PROCEDURE — 25000003 PHARM REV CODE 250: Performed by: INTERNAL MEDICINE

## 2022-01-29 PROCEDURE — 85730 THROMBOPLASTIN TIME PARTIAL: CPT | Performed by: INTERNAL MEDICINE

## 2022-01-29 PROCEDURE — 99232 SBSQ HOSP IP/OBS MODERATE 35: CPT | Mod: ,,, | Performed by: INTERNAL MEDICINE

## 2022-01-29 PROCEDURE — 99232 PR SUBSEQUENT HOSPITAL CARE,LEVL II: ICD-10-PCS | Mod: ,,, | Performed by: INTERNAL MEDICINE

## 2022-01-29 PROCEDURE — 85025 COMPLETE CBC W/AUTO DIFF WBC: CPT | Performed by: INTERNAL MEDICINE

## 2022-01-29 PROCEDURE — 80053 COMPREHEN METABOLIC PANEL: CPT | Performed by: INTERNAL MEDICINE

## 2022-01-29 PROCEDURE — 36415 COLL VENOUS BLD VENIPUNCTURE: CPT | Performed by: INTERNAL MEDICINE

## 2022-01-29 RX ORDER — CLOPIDOGREL BISULFATE 75 MG/1
75 TABLET ORAL DAILY
Qty: 90 TABLET | Refills: 0 | Status: SHIPPED | OUTPATIENT
Start: 2022-01-29 | End: 2022-05-06

## 2022-01-29 RX ORDER — METOPROLOL TARTRATE 25 MG/1
25 TABLET, FILM COATED ORAL 2 TIMES DAILY
Qty: 90 TABLET | Refills: 0 | Status: SHIPPED | OUTPATIENT
Start: 2022-01-29 | End: 2022-03-22 | Stop reason: SDUPTHER

## 2022-01-29 RX ORDER — METOPROLOL TARTRATE 25 MG/1
25 TABLET, FILM COATED ORAL 2 TIMES DAILY
Qty: 60 TABLET | Refills: 11 | Status: SHIPPED | OUTPATIENT
Start: 2022-01-29 | End: 2022-01-29 | Stop reason: SDUPTHER

## 2022-01-29 RX ORDER — ATORVASTATIN CALCIUM 80 MG/1
80 TABLET, FILM COATED ORAL DAILY
Qty: 90 TABLET | Refills: 0 | Status: SHIPPED | OUTPATIENT
Start: 2022-01-30 | End: 2022-01-29

## 2022-01-29 RX ORDER — LISINOPRIL 5 MG/1
5 TABLET ORAL DAILY
Qty: 90 TABLET | Refills: 0 | Status: SHIPPED | OUTPATIENT
Start: 2022-01-30 | End: 2022-05-06

## 2022-01-29 RX ORDER — ATORVASTATIN CALCIUM 80 MG/1
40 TABLET, FILM COATED ORAL DAILY
Qty: 90 TABLET | Refills: 0 | Status: ON HOLD | OUTPATIENT
Start: 2022-01-30 | End: 2023-04-10

## 2022-01-29 RX ORDER — LISINOPRIL 5 MG/1
5 TABLET ORAL DAILY
Qty: 90 TABLET | Refills: 0 | Status: SHIPPED | OUTPATIENT
Start: 2022-01-30 | End: 2022-01-29

## 2022-01-29 RX ORDER — NAPROXEN SODIUM 220 MG/1
81 TABLET, FILM COATED ORAL DAILY
Qty: 90 TABLET | Refills: 0 | Status: SHIPPED | OUTPATIENT
Start: 2022-01-29 | End: 2022-05-06

## 2022-01-29 RX ORDER — NITROGLYCERIN 0.4 MG/1
0.4 TABLET SUBLINGUAL EVERY 5 MIN PRN
Qty: 14 TABLET | Refills: 0 | Status: SHIPPED | OUTPATIENT
Start: 2022-01-29 | End: 2023-04-17 | Stop reason: SDUPTHER

## 2022-01-29 RX ORDER — AMLODIPINE BESYLATE 5 MG/1
5 TABLET ORAL DAILY
Qty: 90 TABLET | Refills: 0 | Status: SHIPPED | OUTPATIENT
Start: 2022-01-29 | End: 2022-05-06

## 2022-01-29 RX ORDER — NITROGLYCERIN 0.4 MG/1
0.4 TABLET SUBLINGUAL EVERY 5 MIN PRN
Qty: 14 TABLET | Refills: 0 | Status: SHIPPED | OUTPATIENT
Start: 2022-01-29 | End: 2022-01-29

## 2022-01-29 RX ADMIN — AMLODIPINE BESYLATE 10 MG: 5 TABLET ORAL at 11:01

## 2022-01-29 RX ADMIN — CLOPIDOGREL BISULFATE 75 MG: 75 TABLET, FILM COATED ORAL at 11:01

## 2022-01-29 RX ADMIN — ASPIRIN 81 MG 81 MG: 81 TABLET ORAL at 11:01

## 2022-01-29 RX ADMIN — FAMOTIDINE 20 MG: 20 TABLET, FILM COATED ORAL at 11:01

## 2022-01-29 RX ADMIN — ATORVASTATIN CALCIUM 80 MG: 40 TABLET, FILM COATED ORAL at 11:01

## 2022-01-29 RX ADMIN — LISINOPRIL 5 MG: 5 TABLET ORAL at 11:01

## 2022-01-29 NOTE — PROGRESS NOTES
[facility]  Cardiology  Progress Note    Patient Name: Stuart Adler  MRN: [id number]  Admission Date: 1/27/2022  Hospital Length of Stay: 1 days  Code Status: Full Code   Attending Physician: Crescencio Mora MD   Primary Care Physician: Primary Doctor No  Expected Discharge Date: 1/29/2022  Principal Problem:NSTEMI (non-ST elevated myocardial infarction)    Subjective:     No acute events overnight.  Patient ambulating today.  No chest pain.    Review of Systems   All other systems reviewed and are negative.    Objective:     Vital Signs (Most Recent):  Temp: 97.7 °F (36.5 °C) (01/29/22 1522)  Pulse: 99 (01/29/22 1522)  Resp: 19 (01/29/22 1522)  BP: (!) 151/95 (01/29/22 1522)  SpO2: (!) 94 % (01/29/22 1522) Vital Signs (24h Range):  Temp:  [97.4 °F (36.3 °C)-98.3 °F (36.8 °C)] 97.7 °F (36.5 °C)  Pulse:  [61-99] 99  Resp:  [16-19] 19  SpO2:  [90 %-95 %] 94 %  BP: (141-178)/(78-97) 151/95     Weight: 125 kg (275 lb 9.2 oz)  Body mass index is 35.38 kg/m².    SpO2: (!) 94 %  O2 Device (Oxygen Therapy): room air      Intake/Output Summary (Last 24 hours) at 1/29/2022 1716  Last data filed at 1/29/2022 0700  Gross per 24 hour   Intake 240 ml   Output 2150 ml   Net -1910 ml       Lines/Drains/Airways     None                 Physical Exam  Vitals reviewed.   Constitutional:       Appearance: Normal appearance.   HENT:      Mouth/Throat:      Mouth: Mucous membranes are moist.   Cardiovascular:      Rate and Rhythm: Normal rate and regular rhythm.      Heart sounds: No murmur heard.  No gallop.    Pulmonary:      Effort: Pulmonary effort is normal.      Breath sounds: Normal breath sounds.   Abdominal:      General: Abdomen is flat.      Palpations: Abdomen is soft.   Musculoskeletal:      Cervical back: Normal range of motion.   Skin:     General: Skin is warm and dry.   Neurological:      General: No focal deficit present.      Mental Status: He is alert and oriented to person, place, and time.    Psychiatric:         Mood and Affect: Mood normal.         Significant Labs:   BMP:   Recent Labs   Lab 01/27/22 2204 01/28/22 0455 01/29/22 0453    101 107    139 137   K 4.1 3.9 3.9    103 101   CO2 25 27 28   BUN 16 16 13   CREATININE 1.0 0.9 0.8   CALCIUM 8.9 8.5* 8.6*   , CBC   Recent Labs   Lab 01/27/22 2204 01/27/22 2204 01/28/22 0455 01/28/22 0455 01/29/22 0453   WBC 7.89  --  6.45  --  7.03  7.03   HGB 15.4  --  15.2  --  15.5  15.5   HCT 46.6   < > 46.4   < > 46.5  46.5     --  173  --  171  171    < > = values in this interval not displayed.    and Troponin   Recent Labs   Lab 01/27/22 2204 01/28/22  0110 01/28/22 0455   TROPONINI 0.031 0.296* 0.856*       Significant Imaging: Echocardiogram:   Transthoracic echo (TTE) complete (Cupid Only):   Results for orders placed or performed during the hospital encounter of 01/26/21   Echo Color Flow Doppler? Yes   Result Value Ref Range    BSA 2.33 m2    TDI SEPTAL 0.06 m/s    LV LATERAL E/E' RATIO 7.11 m/s    LV SEPTAL E/E' RATIO 10.67 m/s    AORTIC VALVE CUSP SEPERATION 2.36 cm    TDI LATERAL 0.09 m/s    PV PEAK VELOCITY 66.00 cm/s    LVIDd 4.25 3.5 - 6.0 cm    IVS 1.11 (A) 0.6 - 1.1 cm    Posterior Wall 1.12 (A) 0.6 - 1.1 cm    Ao root annulus 4.05 cm    LVIDs 2.78 2.1 - 4.0 cm    FS 35 28 - 44 %    LV mass 163.12 g    RVDD 301.00 cm    Left Ventricle Relative Wall Thickness 0.53 cm    AV mean gradient 3 mmHg    AV valve area 4.96 cm2    AV Velocity Ratio 97.43     AV index (prosthetic) 1.03     E/A ratio 0.96     Mean e' 0.08 m/s    E wave deceleration time 230.29 msec    IVRT 91.17 msec    LVOT diameter 2.47 cm    LVOT area 4.8 cm2    LVOT peak gunnar 109.12 m/s    LVOT peak VTI 22.25 cm    Ao peak gunnar 1.12 m/s    Ao VTI 21.50 cm    LVOT stroke volume 106.56 cm3    AV peak gradient 5 mmHg    E/E' ratio 8.53 m/s    MV Peak E Gunnar 0.64 m/s    TR Max Gunnar 3.01 m/s    MV Peak A Gunnar 0.67 m/s    LV Mass Index 71 g/m2     Triscuspid Valve Regurgitation Peak Gradient 36 mmHg    Narrative    · The left ventricle is normal in size with concentric remodeling and   normal systolic function. The estimated ejection fraction is 70%  · Indeterminate left ventricular diastolic function.  · Normal right ventricular size with normal right ventricular systolic   function.  · The aortic root is mildly dilated.  · There is mild pulmonary hypertension.        Assessment and Plan:     Brief HPI:  61-year-old male with history of coronary artery disease status post PCI to RCA admitted with NSTEMI found to have significant left circumflex stenosis status post successful 1 drug-eluting stent yesterday.  Patient reports doing well.  Continue with dual antiplatelet therapy for at least 1 year.  Again discussed with patient the importance of compliance with dual antiplatelet therapy and risk of having a large MI if he is noncompliant.  Patient verbalized understanding.  He is hypertensive, will add beta-blockers, continue with amlodipine and lisinopril.  Patient can follow-up in cardiology clinic in 2-4 weeks after discharge.  Cardiac rehab.  Will sign off.  Thank you for the consult.     Active Diagnoses:    Diagnosis Date Noted POA    PRINCIPAL PROBLEM:  NSTEMI (non-ST elevated myocardial infarction) [I21.4] 01/28/2022 Yes    Non-compliance [Z91.19] 01/26/2021 Not Applicable     Chronic    Facial basal cell cancer [C44.310] 01/26/2021 Yes     Chronic    CAD (coronary artery disease) [I25.10] 01/26/2021 Yes     Chronic    Smoker [F17.200] 01/26/2021 Yes     Chronic    Alcohol use [Z72.89] 01/26/2021 Yes     Chronic      Problems Resolved During this Admission:       VTE Risk Mitigation (From admission, onward)         Ordered     IP VTE HIGH RISK PATIENT  Once         01/28/22 0107     Place sequential compression device  Until discontinued         01/28/22 0107                Nacho Orellana MD  Cardiology  [facility]

## 2022-01-29 NOTE — NURSING
Received patient from ICU nurse Claudette. Wife at bedside. Patient is alert, stable and oriented. Breathing is non labored and even.

## 2022-01-29 NOTE — PLAN OF CARE
Chart and discharge orders reviewed.  Patient discharged home with no further case management needs         01/29/22 1522   Final Note   Assessment Type Final Discharge Note   Anticipated Discharge Disposition Home   What phone number can be called within the next 1-3 days to see how you are doing after discharge? [phone]   Post-Acute Status   Discharge Delays None known at this time

## 2022-01-29 NOTE — NURSING
Patient leaving with discharge orders. IVs dc'd. IV sites CDI and WNL. No c/o pain or nausea. Breathing is non labored and even. VSS. Patient ambulated out with RN.

## 2022-01-29 NOTE — PLAN OF CARE
Problem: Adult Inpatient Plan of Care  Goal: Plan of Care Review  Outcome: Met  Goal: Patient-Specific Goal (Individualized)  Outcome: Met  Goal: Absence of Hospital-Acquired Illness or Injury  Outcome: Met  Goal: Optimal Comfort and Wellbeing  Outcome: Met  Goal: Readiness for Transition of Care  Outcome: Met     Problem: Fall Injury Risk  Goal: Absence of Fall and Fall-Related Injury  Outcome: Met     Problem: Chest Pain  Goal: Resolution of Chest Pain Symptoms  Outcome: Met

## 2022-01-30 NOTE — DISCHARGE SUMMARY
[facility] Medicine  Discharge Summary      Patient Name: Stuart Adler  MRN: [id number]  Patient Class: IP- Inpatient  Admission Date: 1/27/2022  Hospital Length of Stay: 1 days  Discharge Date and Time:  01/29/2022 9:36 PM  Attending Physician: Jalyn att. providers found   Discharging Provider: Crescencio Mora MD  Primary Care Provider: Primary Doctor Jalyn      HPI:   61 year old patient getting admitted with NSTEMI  Pt started having L sided chest pain all of sudden while at rest  Describes as severe chest pain with radiation to neck and LUE  Associated with mild nausea and came to ER  Pt was given NG and got admitted       Procedure(s) (LRB):  Angiogram, Coronary, with Left Heart Cath (N/A)  Percutaneous coronary intervention (N/A)      Hospital Course:        HPI: 61 year old patient getting admitted with NSTEMI  Pt started having L sided chest pain all of sudden while at rest  Describes as severe chest pain with radiation to neck and LUE  Associated with mild nausea and came to ER  Pt was given NG and got admitted     Hospital course  Patient was admitted with chest pain and NSTEMI.  LHC  was done and needed PCI in RCA.  Later is patient was chest pain free and discharged in stable condition and continued  dual platelet therapy, statin and beta-blocker and ACE-inhibitor.  He  was discussed about the compliance and cardiology appointment given to weeks.         Goals of Care Treatment Preferences:  Code Status: Full Code      Consults:   Consults (From admission, onward)        Status Ordering Provider     Inpatient consult to Cardiology  Once        Provider:  Nacho Orellana MD    Completed ARPITA KIDD          No new Assessment & Plan notes have been filed under this hospital service since the last note was generated.  Service: Hospital Medicine    Final Active Diagnoses:    Diagnosis Date Noted POA    PRINCIPAL PROBLEM:  NSTEMI (non-ST elevated myocardial infarction) [I21.4] 01/28/2022 Yes     Non-compliance [Z91.19] 01/26/2021 Not Applicable     Chronic    Facial basal cell cancer [C44.310] 01/26/2021 Yes     Chronic    CAD (coronary artery disease) [I25.10] 01/26/2021 Yes     Chronic    Smoker [F17.200] 01/26/2021 Yes     Chronic    Alcohol use [Z72.89] 01/26/2021 Yes     Chronic      Problems Resolved During this Admission:       Discharged Condition: good    Disposition: Home or Self Care    Follow Up:   Follow-up Information     Nacho Orellana MD In 2 weeks.    Specialties: Interventional Cardiology, Cardiology  Contact information:  [address] [place] [address]  [address]  [facility] [address]  [phone]                       Patient Instructions:      Diet Cardiac     Activity as tolerated       Significant Diagnostic Studies: Labs:   CMP   Recent Labs   Lab 01/27/22 2204 01/28/22 0455 01/29/22 0453    139 137   K 4.1 3.9 3.9    103 101   CO2 25 27 28    101 107   BUN 16 16 13   CREATININE 1.0 0.9 0.8   CALCIUM 8.9 8.5* 8.6*   PROT 7.2 6.6 6.7   ALBUMIN 3.7 3.5 3.5   BILITOT 0.7 0.7 0.9   ALKPHOS 64 63 60   AST 19 20 20   ALT 20 18 18   ANIONGAP 11 9 8   ESTGFRAFRICA >60.0 >60.0 >60.0   EGFRNONAA >60.0 >60.0 >60.0    and CBC   Recent Labs   Lab 01/27/22 2204 01/27/22 2204 01/28/22 0455 01/28/22 0455 01/29/22 0453   WBC 7.89  --  6.45  --  7.03  7.03   HGB 15.4  --  15.2  --  15.5  15.5   HCT 46.6   < > 46.4   < > 46.5  46.5     --  173  --  171  171    < > = values in this interval not displayed.       Pending Diagnostic Studies:     Procedure Component Value Units Date/Time    EKG 12-LEAD starting tomorrow [[id number]]     Order Status: Sent Lab Status: No result          Medications:  Reconciled Home Medications:      Medication List      START taking these medications    atorvastatin 80 MG tablet  Commonly known as: LIPITOR  Take 0.5 tablets (40 mg total) by mouth once daily.  Start taking on: January 30, 2022     lisinopriL 5 MG tablet  Commonly known as:  PRINIVIL,ZESTRIL  Take 1 tablet (5 mg total) by mouth once daily.  Start taking on: January 30, 2022     metoprolol tartrate 25 MG tablet  Commonly known as: LOPRESSOR  Take 1 tablet (25 mg total) by mouth 2 (two) times daily.     nitroGLYCERIN 0.4 MG SL tablet  Commonly known as: NITROSTAT  Place 1 tablet (0.4 mg total) under the tongue every 5 (five) minutes as needed for Chest pain.        CONTINUE taking these medications    amLODIPine 5 MG tablet  Commonly known as: NORVASC  Take 1 tablet (5 mg total) by mouth once daily.     aspirin 81 MG Chew  Take 1 tablet (81 mg total) by mouth once daily.     clopidogreL 75 mg tablet  Commonly known as: PLAVIX  Take 1 tablet (75 mg total) by mouth once daily.            Indwelling Lines/Drains at time of discharge:   Lines/Drains/Airways     None             General: Patient resting comfortably in no acute distress. Appears as stated age. Calm  Eyes: EOM intact. No conjunctivae injection. No scleral icterus.  ENT: Hearing grossly intact. No discharge from ears. No nasal discharge.   CVS: RRR. No LE edema BL.  Lungs: CTA BL, no wheezing or crackles. Good breath sounds. No accessory muscle use. No acute respiratory distress  Neuro: non focal , Follows commands. Responds appropriately    Time spent on the discharge of patient: 44 minutes         Crescencio Mora MD  Department of Hospital Medicine  Formerly [facility], later [facility]

## 2022-03-22 RX ORDER — METOPROLOL TARTRATE 25 MG/1
25 TABLET, FILM COATED ORAL 2 TIMES DAILY
Qty: 180 TABLET | Refills: 3 | Status: ON HOLD | OUTPATIENT
Start: 2022-03-22 | End: 2023-04-10

## 2022-03-22 NOTE — TELEPHONE ENCOUNTER
----- Message from Mariangel Stuart sent at 3/22/2022 12:58 PM CDT -----  Regarding: refill  Contact: pt  Type:  RX Refill Request    Who Called:  pt  Refill or New Rx:  refill  RX Name and Strength:  metoprolol tartrate (LOPRESSOR) 25 MG tablet  How is the patient currently taking it? (ex. 1XDay):  n/a  Is this a 30 day or 90 day RX:  90    Preferred Pharmacy with phone number:    [id number] [address] & [address] [address]  Phone: [phone] Fax: [phone]    Local or Mail Order:  local  Ordering Provider:  peterson Hoover Call Back Number:  [phone]    Additional Information:  please call to advise

## 2023-04-08 ENCOUNTER — HOSPITAL ENCOUNTER (OUTPATIENT)
Facility: HOSPITAL | Age: 63
Discharge: HOME | End: 2023-04-11
Attending: EMERGENCY MEDICINE | Admitting: FAMILY MEDICINE
Payer: MEDICARE

## 2023-04-08 DIAGNOSIS — R07.9 CHEST PAIN: ICD-10-CM

## 2023-04-08 DIAGNOSIS — I16.0 HYPERTENSIVE URGENCY: Primary | ICD-10-CM

## 2023-04-08 LAB
ALBUMIN SERPL BCP-MCNC: 3.5 G/DL (ref 3.5–5.2)
ALP SERPL-CCNC: 73 U/L (ref 55–135)
ALT SERPL W/O P-5'-P-CCNC: 16 U/L (ref 10–44)
ANION GAP SERPL CALC-SCNC: 5 MMOL/L (ref 8–16)
AST SERPL-CCNC: 17 U/L (ref 10–40)
BASOPHILS # BLD AUTO: 0.03 K/UL (ref 0–0.2)
BASOPHILS NFR BLD: 0.4 % (ref 0–1.9)
BILIRUB SERPL-MCNC: 0.4 MG/DL (ref 0.1–1)
BNP SERPL-MCNC: 21 PG/ML (ref 0–99)
BUN SERPL-MCNC: 14 MG/DL (ref 8–23)
CALCIUM SERPL-MCNC: 9.1 MG/DL (ref 8.7–10.5)
CHLORIDE SERPL-SCNC: 107 MMOL/L (ref 95–110)
CO2 SERPL-SCNC: 26 MMOL/L (ref 23–29)
CREAT SERPL-MCNC: 0.9 MG/DL (ref 0.5–1.4)
DIFFERENTIAL METHOD: ABNORMAL
EOSINOPHIL # BLD AUTO: 0.2 K/UL (ref 0–0.5)
EOSINOPHIL NFR BLD: 2.5 % (ref 0–8)
ERYTHROCYTE [DISTWIDTH] IN BLOOD BY AUTOMATED COUNT: 12.3 % (ref 11.5–14.5)
EST. GFR  (NO RACE VARIABLE): >60 ML/MIN/1.73 M^2
GLUCOSE SERPL-MCNC: 126 MG/DL (ref 70–110)
HCT VFR BLD AUTO: 44.5 % (ref 40–54)
HGB BLD-MCNC: 14.6 G/DL (ref 14–18)
IMM GRANULOCYTES # BLD AUTO: 0.03 K/UL (ref 0–0.04)
IMM GRANULOCYTES NFR BLD AUTO: 0.4 % (ref 0–0.5)
INR PPP: 0.9 (ref 0.8–1.2)
LYMPHOCYTES # BLD AUTO: 1.5 K/UL (ref 1–4.8)
LYMPHOCYTES NFR BLD: 19.1 % (ref 18–48)
MCH RBC QN AUTO: 32.4 PG (ref 27–31)
MCHC RBC AUTO-ENTMCNC: 32.8 G/DL (ref 32–36)
MCV RBC AUTO: 99 FL (ref 82–98)
MONOCYTES # BLD AUTO: 0.6 K/UL (ref 0.3–1)
MONOCYTES NFR BLD: 7.3 % (ref 4–15)
NEUTROPHILS # BLD AUTO: 5.6 K/UL (ref 1.8–7.7)
NEUTROPHILS NFR BLD: 70.3 % (ref 38–73)
NRBC BLD-RTO: 0 /100 WBC
PLATELET # BLD AUTO: 176 K/UL (ref 150–450)
PMV BLD AUTO: 10.5 FL (ref 9.2–12.9)
POTASSIUM SERPL-SCNC: 4 MMOL/L (ref 3.5–5.1)
PROT SERPL-MCNC: 6.7 G/DL (ref 6–8.4)
PROTHROMBIN TIME: 10.3 SEC (ref 9–12.5)
RBC # BLD AUTO: 4.5 M/UL (ref 4.6–6.2)
SODIUM SERPL-SCNC: 138 MMOL/L (ref 136–145)
TROPONIN I SERPL HS-MCNC: 14 PG/ML (ref 0–14.9)
TROPONIN I SERPL HS-MCNC: 23.7 PG/ML (ref 0–14.9)
WBC # BLD AUTO: 7.9 K/UL (ref 3.9–12.7)

## 2023-04-08 PROCEDURE — 99285 EMERGENCY DEPT VISIT HI MDM: CPT | Mod: 25

## 2023-04-08 PROCEDURE — 83880 ASSAY OF NATRIURETIC PEPTIDE: CPT | Performed by: NURSE PRACTITIONER

## 2023-04-08 PROCEDURE — 85610 PROTHROMBIN TIME: CPT | Performed by: NURSE PRACTITIONER

## 2023-04-08 PROCEDURE — 93010 ELECTROCARDIOGRAM REPORT: CPT | Mod: ,,, | Performed by: INTERNAL MEDICINE

## 2023-04-08 PROCEDURE — 93005 ELECTROCARDIOGRAM TRACING: CPT | Performed by: INTERNAL MEDICINE

## 2023-04-08 PROCEDURE — 25000003 PHARM REV CODE 250: Performed by: EMERGENCY MEDICINE

## 2023-04-08 PROCEDURE — 93010 EKG 12-LEAD: ICD-10-PCS | Mod: ,,, | Performed by: INTERNAL MEDICINE

## 2023-04-08 PROCEDURE — 80053 COMPREHEN METABOLIC PANEL: CPT | Performed by: NURSE PRACTITIONER

## 2023-04-08 PROCEDURE — 84484 ASSAY OF TROPONIN QUANT: CPT | Performed by: NURSE PRACTITIONER

## 2023-04-08 PROCEDURE — 85025 COMPLETE CBC W/AUTO DIFF WBC: CPT | Performed by: NURSE PRACTITIONER

## 2023-04-08 RX ORDER — ASPIRIN 325 MG
325 TABLET, DELAYED RELEASE (ENTERIC COATED) ORAL
Status: COMPLETED | OUTPATIENT
Start: 2023-04-08 | End: 2023-04-08

## 2023-04-08 RX ADMIN — NITROGLYCERIN 0.5 INCH: 20 OINTMENT TOPICAL at 10:04

## 2023-04-08 RX ADMIN — ASPIRIN 325 MG: 325 TABLET, COATED ORAL at 10:04

## 2023-04-09 ENCOUNTER — CLINICAL SUPPORT (OUTPATIENT)
Dept: CARDIOLOGY | Facility: HOSPITAL | Age: 63
End: 2023-04-09
Attending: FAMILY MEDICINE
Payer: MEDICARE

## 2023-04-09 PROBLEM — C44.90 SKIN CARCINOMA: Status: ACTIVE | Noted: 2023-04-09

## 2023-04-09 PROBLEM — I10 UNCONTROLLED HYPERTENSION: Status: ACTIVE | Noted: 2023-04-09

## 2023-04-09 PROBLEM — R07.9 CHEST PAIN: Status: ACTIVE | Noted: 2023-04-09

## 2023-04-09 PROBLEM — C44.99 SKIN CARCINOMA: Status: ACTIVE | Noted: 2023-04-09

## 2023-04-09 LAB
ANION GAP SERPL CALC-SCNC: 9 MMOL/L (ref 8–16)
BASOPHILS # BLD AUTO: 0.05 K/UL (ref 0–0.2)
BASOPHILS NFR BLD: 0.7 % (ref 0–1.9)
BUN SERPL-MCNC: 14 MG/DL (ref 8–23)
CALCIUM SERPL-MCNC: 9.1 MG/DL (ref 8.7–10.5)
CHLORIDE SERPL-SCNC: 106 MMOL/L (ref 95–110)
CHOLEST SERPL-MCNC: 233 MG/DL (ref 120–199)
CHOLEST/HDLC SERPL: 5.4 {RATIO} (ref 2–5)
CO2 SERPL-SCNC: 26 MMOL/L (ref 23–29)
CREAT SERPL-MCNC: 0.9 MG/DL (ref 0.5–1.4)
DIFFERENTIAL METHOD: ABNORMAL
EOSINOPHIL # BLD AUTO: 0.2 K/UL (ref 0–0.5)
EOSINOPHIL NFR BLD: 2.5 % (ref 0–8)
ERYTHROCYTE [DISTWIDTH] IN BLOOD BY AUTOMATED COUNT: 12.3 % (ref 11.5–14.5)
EST. GFR  (NO RACE VARIABLE): >60 ML/MIN/1.73 M^2
ESTIMATED AVG GLUCOSE: 111 MG/DL (ref 68–131)
GLUCOSE SERPL-MCNC: 99 MG/DL (ref 70–110)
HBA1C MFR BLD: 5.5 % (ref 4.5–6.2)
HCT VFR BLD AUTO: 47.6 % (ref 40–54)
HDLC SERPL-MCNC: 43 MG/DL (ref 40–75)
HDLC SERPL: 18.5 % (ref 20–50)
HGB BLD-MCNC: 15.8 G/DL (ref 14–18)
IMM GRANULOCYTES # BLD AUTO: 0.02 K/UL (ref 0–0.04)
IMM GRANULOCYTES NFR BLD AUTO: 0.3 % (ref 0–0.5)
LDLC SERPL CALC-MCNC: 169 MG/DL (ref 63–159)
LYMPHOCYTES # BLD AUTO: 1.5 K/UL (ref 1–4.8)
LYMPHOCYTES NFR BLD: 21 % (ref 18–48)
MAGNESIUM SERPL-MCNC: 2.1 MG/DL (ref 1.6–2.6)
MCH RBC QN AUTO: 32.6 PG (ref 27–31)
MCHC RBC AUTO-ENTMCNC: 33.2 G/DL (ref 32–36)
MCV RBC AUTO: 98 FL (ref 82–98)
MONOCYTES # BLD AUTO: 0.5 K/UL (ref 0.3–1)
MONOCYTES NFR BLD: 6.9 % (ref 4–15)
NEUTROPHILS # BLD AUTO: 5 K/UL (ref 1.8–7.7)
NEUTROPHILS NFR BLD: 68.6 % (ref 38–73)
NONHDLC SERPL-MCNC: 190 MG/DL
NRBC BLD-RTO: 0 /100 WBC
PLATELET # BLD AUTO: 154 K/UL (ref 150–450)
PMV BLD AUTO: 10.6 FL (ref 9.2–12.9)
POTASSIUM SERPL-SCNC: 4.5 MMOL/L (ref 3.5–5.1)
RBC # BLD AUTO: 4.84 M/UL (ref 4.6–6.2)
SODIUM SERPL-SCNC: 141 MMOL/L (ref 136–145)
TRIGL SERPL-MCNC: 105 MG/DL (ref 30–150)
TROPONIN I SERPL HS-MCNC: 25 PG/ML (ref 0–14.9)
TSH SERPL DL<=0.005 MIU/L-ACNC: 1.48 UIU/ML (ref 0.34–5.6)
WBC # BLD AUTO: 7.23 K/UL (ref 3.9–12.7)

## 2023-04-09 PROCEDURE — 36415 COLL VENOUS BLD VENIPUNCTURE: CPT | Performed by: FAMILY MEDICINE

## 2023-04-09 PROCEDURE — 80048 BASIC METABOLIC PNL TOTAL CA: CPT | Performed by: FAMILY MEDICINE

## 2023-04-09 PROCEDURE — 84443 ASSAY THYROID STIM HORMONE: CPT | Performed by: FAMILY MEDICINE

## 2023-04-09 PROCEDURE — 99223 1ST HOSP IP/OBS HIGH 75: CPT | Mod: ,,, | Performed by: INTERNAL MEDICINE

## 2023-04-09 PROCEDURE — 93306 TTE W/DOPPLER COMPLETE: CPT | Mod: 26,,, | Performed by: GENERAL PRACTICE

## 2023-04-09 PROCEDURE — 84484 ASSAY OF TROPONIN QUANT: CPT | Performed by: FAMILY MEDICINE

## 2023-04-09 PROCEDURE — 63600175 PHARM REV CODE 636 W HCPCS: Performed by: FAMILY MEDICINE

## 2023-04-09 PROCEDURE — 96372 THER/PROPH/DIAG INJ SC/IM: CPT | Performed by: FAMILY MEDICINE

## 2023-04-09 PROCEDURE — G0378 HOSPITAL OBSERVATION PER HR: HCPCS

## 2023-04-09 PROCEDURE — 94761 N-INVAS EAR/PLS OXIMETRY MLT: CPT

## 2023-04-09 PROCEDURE — 99900035 HC TECH TIME PER 15 MIN (STAT)

## 2023-04-09 PROCEDURE — 93306 TTE W/DOPPLER COMPLETE: CPT

## 2023-04-09 PROCEDURE — 83036 HEMOGLOBIN GLYCOSYLATED A1C: CPT | Performed by: FAMILY MEDICINE

## 2023-04-09 PROCEDURE — 25000242 PHARM REV CODE 250 ALT 637 W/ HCPCS: Performed by: FAMILY MEDICINE

## 2023-04-09 PROCEDURE — 25000003 PHARM REV CODE 250: Performed by: INTERNAL MEDICINE

## 2023-04-09 PROCEDURE — 85025 COMPLETE CBC W/AUTO DIFF WBC: CPT | Performed by: FAMILY MEDICINE

## 2023-04-09 PROCEDURE — 25000003 PHARM REV CODE 250: Performed by: FAMILY MEDICINE

## 2023-04-09 PROCEDURE — 96374 THER/PROPH/DIAG INJ IV PUSH: CPT

## 2023-04-09 PROCEDURE — 93306 ECHO (CUPID ONLY): ICD-10-PCS | Mod: 26,,, | Performed by: GENERAL PRACTICE

## 2023-04-09 PROCEDURE — 80061 LIPID PANEL: CPT | Performed by: FAMILY MEDICINE

## 2023-04-09 PROCEDURE — 83735 ASSAY OF MAGNESIUM: CPT | Performed by: FAMILY MEDICINE

## 2023-04-09 PROCEDURE — 99223 PR INITIAL HOSPITAL CARE,LEVL III: ICD-10-PCS | Mod: ,,, | Performed by: INTERNAL MEDICINE

## 2023-04-09 RX ORDER — ATORVASTATIN CALCIUM 40 MG/1
40 TABLET, FILM COATED ORAL DAILY
Status: DISCONTINUED | OUTPATIENT
Start: 2023-04-09 | End: 2023-04-11 | Stop reason: HOSPADM

## 2023-04-09 RX ORDER — IBUPROFEN 200 MG
16 TABLET ORAL
Status: DISCONTINUED | OUTPATIENT
Start: 2023-04-09 | End: 2023-04-11 | Stop reason: HOSPADM

## 2023-04-09 RX ORDER — HYDRALAZINE HYDROCHLORIDE 20 MG/ML
5 INJECTION INTRAMUSCULAR; INTRAVENOUS EVERY 4 HOURS PRN
Status: DISCONTINUED | OUTPATIENT
Start: 2023-04-09 | End: 2023-04-11 | Stop reason: HOSPADM

## 2023-04-09 RX ORDER — CLOPIDOGREL BISULFATE 75 MG/1
75 TABLET ORAL DAILY
Status: DISCONTINUED | OUTPATIENT
Start: 2023-04-09 | End: 2023-04-11 | Stop reason: HOSPADM

## 2023-04-09 RX ORDER — NITROGLYCERIN 0.4 MG/1
0.4 TABLET SUBLINGUAL EVERY 5 MIN PRN
Status: DISCONTINUED | OUTPATIENT
Start: 2023-04-09 | End: 2023-04-11 | Stop reason: HOSPADM

## 2023-04-09 RX ORDER — LISINOPRIL 5 MG/1
5 TABLET ORAL DAILY
Status: DISCONTINUED | OUTPATIENT
Start: 2023-04-09 | End: 2023-04-11 | Stop reason: HOSPADM

## 2023-04-09 RX ORDER — MORPHINE SULFATE 4 MG/ML
4 INJECTION, SOLUTION INTRAMUSCULAR; INTRAVENOUS EVERY 4 HOURS PRN
Status: DISCONTINUED | OUTPATIENT
Start: 2023-04-09 | End: 2023-04-11 | Stop reason: HOSPADM

## 2023-04-09 RX ORDER — SIMETHICONE 80 MG
1 TABLET,CHEWABLE ORAL 4 TIMES DAILY PRN
Status: DISCONTINUED | OUTPATIENT
Start: 2023-04-09 | End: 2023-04-11 | Stop reason: HOSPADM

## 2023-04-09 RX ORDER — IBUPROFEN 200 MG
24 TABLET ORAL
Status: DISCONTINUED | OUTPATIENT
Start: 2023-04-09 | End: 2023-04-11 | Stop reason: HOSPADM

## 2023-04-09 RX ORDER — NALOXONE HCL 0.4 MG/ML
0.02 VIAL (ML) INJECTION
Status: DISCONTINUED | OUTPATIENT
Start: 2023-04-09 | End: 2023-04-11 | Stop reason: HOSPADM

## 2023-04-09 RX ORDER — POLYETHYLENE GLYCOL 3350 17 G/17G
17 POWDER, FOR SOLUTION ORAL 2 TIMES DAILY PRN
Status: DISCONTINUED | OUTPATIENT
Start: 2023-04-09 | End: 2023-04-11 | Stop reason: HOSPADM

## 2023-04-09 RX ORDER — METOPROLOL TARTRATE 25 MG/1
25 TABLET, FILM COATED ORAL 2 TIMES DAILY
Status: DISCONTINUED | OUTPATIENT
Start: 2023-04-09 | End: 2023-04-11 | Stop reason: HOSPADM

## 2023-04-09 RX ORDER — ENOXAPARIN SODIUM 100 MG/ML
40 INJECTION SUBCUTANEOUS EVERY 24 HOURS
Status: DISCONTINUED | OUTPATIENT
Start: 2023-04-09 | End: 2023-04-11 | Stop reason: HOSPADM

## 2023-04-09 RX ORDER — ONDANSETRON 2 MG/ML
4 INJECTION INTRAMUSCULAR; INTRAVENOUS EVERY 6 HOURS PRN
Status: DISCONTINUED | OUTPATIENT
Start: 2023-04-09 | End: 2023-04-11 | Stop reason: HOSPADM

## 2023-04-09 RX ORDER — ACETAMINOPHEN 325 MG/1
650 TABLET ORAL EVERY 8 HOURS PRN
Status: DISCONTINUED | OUTPATIENT
Start: 2023-04-09 | End: 2023-04-11 | Stop reason: HOSPADM

## 2023-04-09 RX ORDER — TALC
6 POWDER (GRAM) TOPICAL NIGHTLY PRN
Status: DISCONTINUED | OUTPATIENT
Start: 2023-04-09 | End: 2023-04-11 | Stop reason: HOSPADM

## 2023-04-09 RX ORDER — IPRATROPIUM BROMIDE AND ALBUTEROL SULFATE 2.5; .5 MG/3ML; MG/3ML
3 SOLUTION RESPIRATORY (INHALATION) EVERY 4 HOURS PRN
Status: DISCONTINUED | OUTPATIENT
Start: 2023-04-09 | End: 2023-04-11 | Stop reason: HOSPADM

## 2023-04-09 RX ORDER — SODIUM CHLORIDE 0.9 % (FLUSH) 0.9 %
10 SYRINGE (ML) INJECTION
Status: DISCONTINUED | OUTPATIENT
Start: 2023-04-09 | End: 2023-04-11 | Stop reason: HOSPADM

## 2023-04-09 RX ORDER — AMOXICILLIN 250 MG
1 CAPSULE ORAL 2 TIMES DAILY PRN
Status: DISCONTINUED | OUTPATIENT
Start: 2023-04-09 | End: 2023-04-11 | Stop reason: HOSPADM

## 2023-04-09 RX ORDER — AMLODIPINE BESYLATE 5 MG/1
5 TABLET ORAL DAILY
Status: DISCONTINUED | OUTPATIENT
Start: 2023-04-09 | End: 2023-04-11 | Stop reason: HOSPADM

## 2023-04-09 RX ORDER — HYDROCODONE BITARTRATE AND ACETAMINOPHEN 5; 325 MG/1; MG/1
1 TABLET ORAL EVERY 4 HOURS PRN
Status: DISCONTINUED | OUTPATIENT
Start: 2023-04-09 | End: 2023-04-11 | Stop reason: HOSPADM

## 2023-04-09 RX ORDER — GLUCAGON 1 MG
1 KIT INJECTION
Status: DISCONTINUED | OUTPATIENT
Start: 2023-04-09 | End: 2023-04-11 | Stop reason: HOSPADM

## 2023-04-09 RX ORDER — NAPROXEN SODIUM 220 MG/1
81 TABLET, FILM COATED ORAL DAILY
Status: DISCONTINUED | OUTPATIENT
Start: 2023-04-09 | End: 2023-04-11 | Stop reason: HOSPADM

## 2023-04-09 RX ORDER — HYDRALAZINE HYDROCHLORIDE 20 MG/ML
10 INJECTION INTRAMUSCULAR; INTRAVENOUS EVERY 4 HOURS PRN
Status: DISCONTINUED | OUTPATIENT
Start: 2023-04-09 | End: 2023-04-10

## 2023-04-09 RX ADMIN — HYDROCODONE BITARTRATE AND ACETAMINOPHEN 1 TABLET: 5; 325 TABLET ORAL at 08:04

## 2023-04-09 RX ADMIN — NITROGLYCERIN 0.4 MG: 0.4 TABLET SUBLINGUAL at 12:04

## 2023-04-09 RX ADMIN — ENOXAPARIN SODIUM 40 MG: 100 INJECTION SUBCUTANEOUS at 05:04

## 2023-04-09 RX ADMIN — ATORVASTATIN CALCIUM 40 MG: 40 TABLET, FILM COATED ORAL at 09:04

## 2023-04-09 RX ADMIN — HYDRALAZINE HYDROCHLORIDE 10 MG: 20 INJECTION, SOLUTION INTRAMUSCULAR; INTRAVENOUS at 07:04

## 2023-04-09 RX ADMIN — LISINOPRIL 5 MG: 5 TABLET ORAL at 09:04

## 2023-04-09 RX ADMIN — Medication 6 MG: at 09:04

## 2023-04-09 RX ADMIN — CLOPIDOGREL BISULFATE 75 MG: 75 TABLET, FILM COATED ORAL at 09:04

## 2023-04-09 RX ADMIN — AMLODIPINE BESYLATE 5 MG: 5 TABLET ORAL at 09:04

## 2023-04-09 RX ADMIN — METOPROLOL TARTRATE 25 MG: 25 TABLET, FILM COATED ORAL at 09:04

## 2023-04-09 RX ADMIN — ASPIRIN 81 MG 81 MG: 81 TABLET ORAL at 08:04

## 2023-04-09 NOTE — ASSESSMENT & PLAN NOTE
Resume amlodipine, metoprolol and lisinopril use.  Use intravenous hydralazine 10 mg IV Q 2 hours p.r.n. for systolic blood pressure above 160 mmHg.

## 2023-04-09 NOTE — SUBJECTIVE & OBJECTIVE
Interval History:  Patient is reporting intermittent substernal chest pain for the past week without any associated diaphoresis and nausea.  Patient reporting ongoing tobacco cigarette smoking more than 1 pack per day.  Patient admits noncompliance with medications and has not taken any medication for few months.  Patient also reporting 3-4 glasses of whiskey daily.    Review of Systems   Constitutional:  Negative for chills and fever.   HENT:  Negative for congestion and sore throat.    Eyes:  Negative for visual disturbance.   Respiratory:  Negative for cough and shortness of breath.    Cardiovascular:  Positive for chest pain. Negative for palpitations.   Gastrointestinal:  Negative for abdominal pain, constipation, diarrhea, nausea and vomiting.   Endocrine: Negative for cold intolerance and heat intolerance.   Genitourinary:  Negative for dysuria and hematuria.   Musculoskeletal:  Negative for arthralgias and myalgias.   Skin:  Negative for rash.   Neurological:  Negative for tremors and seizures.   Hematological:  Negative for adenopathy. Does not bruise/bleed easily.   Objective:     Vital Signs (Most Recent):  Temp: 98 °F (36.7 °C) (04/09/23 0709)  Pulse: 72 (04/09/23 0709)  Resp: 20 (04/09/23 0709)  BP: (!) 163/108 (04/09/23 0709)  SpO2: 95 % (04/09/23 0709)   Vital Signs (24h Range):  Temp:  [97.8 °F (36.6 °C)-98 °F (36.7 °C)] 98 °F (36.7 °C)  Pulse:  [64-76] 72  Resp:  [12-20] 20  SpO2:  [95 %-98 %] 95 %  BP: (129-166)/() 163/108     Weight: 130.8 kg (288 lb 6.4 oz)  Body mass index is 37.03 kg/m².  No intake or output data in the 24 hours ending 04/09/23 0735   Physical Exam  Vitals and nursing note reviewed.   Constitutional:       Appearance: Normal appearance. He is well-developed.   HENT:      Head: Normocephalic and atraumatic.      Nose: Nose normal. No septal deviation.   Eyes:      Conjunctiva/sclera: Conjunctivae normal.      Pupils: Pupils are equal, round, and reactive to light.   Neck:       Thyroid: No thyroid mass.      Vascular: No JVD.      Trachea: No tracheal tenderness or tracheal deviation.   Cardiovascular:      Rate and Rhythm: Normal rate and regular rhythm.      Heart sounds: S1 normal and S2 normal. No murmur heard.    No friction rub. No gallop.   Pulmonary:      Effort: Pulmonary effort is normal.      Breath sounds: Normal breath sounds. No decreased breath sounds, wheezing, rhonchi or rales.   Abdominal:      General: Bowel sounds are normal. There is no distension.      Palpations: Abdomen is soft. There is no hepatomegaly, splenomegaly or mass.      Tenderness: There is no abdominal tenderness.   Skin:     General: Skin is warm.      Findings: No rash.   Neurological:      General: No focal deficit present.      Mental Status: He is alert and oriented to person, place, and time.      Cranial Nerves: No cranial nerve deficit.      Sensory: No sensory deficit.   Psychiatric:         Mood and Affect: Mood normal.         Behavior: Behavior normal.       Significant Labs: All pertinent labs within the past 24 hours have been reviewed.  CBC:   Recent Labs   Lab 04/08/23 2028 04/09/23  0154   WBC 7.90 7.23   HGB 14.6 15.8   HCT 44.5 47.6    154     CMP:   Recent Labs   Lab 04/08/23 2028 04/09/23  0154    141   K 4.0 4.5    106   CO2 26 26   * 99   BUN 14 14   CREATININE 0.9 0.9   CALCIUM 9.1 9.1   PROT 6.7  --    ALBUMIN 3.5  --    BILITOT 0.4  --    ALKPHOS 73  --    AST 17  --    ALT 16  --    ANIONGAP 5* 9     Coagulation:   Recent Labs   Lab 04/08/23 2028   INR 0.9     Lipid Panel:   Recent Labs   Lab 04/09/23  0154   CHOL 233*   HDL 43   LDLCALC 169.0*   TRIG 105   CHOLHDL 18.5*   Troponin hs: 23.7 ->25.  Microbiology Results (last 7 days)       ** No results found for the last 168 hours. **          Significant Imaging:   ECHO: Pending.     Lexiscan: Pending.     CXR: Pending.

## 2023-04-09 NOTE — H&P
[facility] Medicine   History & Physical     Patient Name: Stuart Adler  MRN: [id number]  Admission Date: 4/8/2023  8:17 PM  Attending Physician: Marilu Tubbs MD  Face-to-Face encounter date: 04/09/2023 12:28 AM    Patient information was obtained from patient, past medical records, ER physician, and ER records.     HISTORY OF PRESENT ILLNESS:     Stuart Adler is a 62 y.o. White male   With PMH of CAD, HTN, smoking,  who presents with chest pain.    Located substernal, no radiation  Occurring intermittently  Moderate severity  Onset 1 week ago  Progressively worsening  Much worse today  Initally alleviated with nitroglycerin  Then CP returned  Next dose nitro did not fully improve his symptoms  This prompted ER presentation    Pt given asa and nitro in the ER  No SOB  No diaphoresis  No n/v  No LE edema    Pt noncompliant with medication and folllow-up  Ran out of meds except nitroglycerin a long time ago    REVIEW OF SYSTEMS:     All systems reviewed and are negative except as noted per above.    PAST MEDICAL HISTORY:     Past Medical History:   Diagnosis Date    CAD (coronary artery disease)     Ethanolism     Heart attack     Hypertension     Smoker        PAST SURGICAL HISTORY:     Past Surgical History:   Procedure Laterality Date    ANGIOGRAM, CORONARY, WITH LEFT HEART CATHETERIZATION N/A 1/28/2022    Procedure: Angiogram, Coronary, with Left Heart Cath;  Surgeon: Nacho Orellana MD;  Location: [facility] CATH/EP LAB;  Service: Cardiology;  Laterality: N/A;    FACIAL COSMETIC SURGERY      PERCUTANEOUS CORONARY INTERVENTION (PCI) FOR CHRONIC TOTAL OCCLUSION OF CORONARY ARTERY         ALLERGIES:   Patient has no known allergies.    FAMILY HISTORY:     Family History   Problem Relation Age of Onset    Heart disease Father        SOCIAL HISTORY:     Social History     Tobacco Use    Smoking status: Every Day     Packs/day: 1.00     Types: Cigarettes    Smokeless tobacco: Not on file  "  Substance Use Topics    Alcohol use: Yes     Comment: 1/4 bottle whiskey every day        Social History     Substance and Sexual Activity   Sexual Activity Not on file        HOME MEDICATIONS:     Prior to Admission medications    Medication Sig Start Date End Date Taking? Authorizing Provider   amLODIPine (NORVASC) 5 MG tablet TAKE 1 TABLET(5 MG) BY MOUTH EVERY DAY. 5/6/22   Crescencio Mora MD   aspirin 81 MG Chew CHEW AND SWALLOW 1 TABLET(81 MG) BY MOUTH EVERY DAY 5/6/22   Crescencio Mora MD   atorvastatin (LIPITOR) 80 MG tablet Take 0.5 tablets (40 mg total) by mouth once daily. 1/30/22 7/29/22  Crescencio Mora MD   clopidogreL (PLAVIX) 75 mg tablet TAKE 1 TABLET(75 MG) BY MOUTH EVERY DAY 5/6/22   Crescencio Mora MD   lisinopriL (PRINIVIL,ZESTRIL) 5 MG tablet TAKE 1 TABLET(5 MG) BY MOUTH EVERY DAY 5/6/22   Crescencio Mora MD   metoprolol tartrate (LOPRESSOR) 25 MG tablet Take 1 tablet (25 mg total) by mouth 2 (two) times daily. 3/22/22 3/22/23  Nacho Orellana MD   nitroGLYCERIN (NITROSTAT) 0.4 MG SL tablet Place 1 tablet (0.4 mg total) under the tongue every 5 (five) minutes as needed for Chest pain. 1/29/22 1/29/23  Crescencio Mora MD       PHYSICAL EXAM:     BP (!) 154/84   Pulse 66   Temp 97.8 °F (36.6 °C) (Oral)   Resp 12   Ht 6' 2" (1.88 m)   Wt 113.4 kg (250 lb)   SpO2 96%   BMI 32.10 kg/m²     Gen: alert, responsive; previous surgical changes to R face 2/2 BCC  HEENT:  Eyes - no pallor  External ears with no lesions  Nares patent  Mouth/Throat:  trachea midline  CV: RRR  Lungs: CTA B/L  Abd: +BS, soft, NT, ND  Ext: no atrophy or edema  Skin: warm, dry  Neuro: grossly intact  Psych: pleasant     LABS AND IMAGING:     Labs Reviewed   CBC W/ AUTO DIFFERENTIAL - Abnormal; Notable for the following components:       Result Value    RBC 4.50 (*)     MCV 99 (*)     MCH 32.4 (*)     All other components within normal limits   COMPREHENSIVE METABOLIC PANEL - Abnormal; Notable for the following components:    Glucose " 126 (*)     Anion Gap 5 (*)     All other components within normal limits   TROPONIN I HIGH SENSITIVITY - Abnormal; Notable for the following components:    Troponin I High Sensitivity 23.7 (*)     All other components within normal limits   B-TYPE NATRIURETIC PEPTIDE   TROPONIN I HIGH SENSITIVITY   PROTIME-INR   TROPONIN I HIGH SENSITIVITY   HEMOGLOBIN A1C   TSH   LIPID PANEL       Imaging Results              X-Ray Chest AP Portable (In process)                     Labs and images reviewed personally by me.  See my personal assessment/interpretation of results below:    ASSESSMENT & PLAN:   Stuart Adler is a 62 y.o. male admitted for    Active Hospital Problems    Diagnosis  POA    *Chest pain [R07.9]  Yes    Smoker [F17.200]  Yes     Chronic    Non-compliance [Z91.199]  Not Applicable     Chronic    CAD (coronary artery disease) [I25.10]  Yes     Chronic      Resolved Hospital Problems   No resolved problems to display.        Plan    Chest pain  CAD  - EKG  - trending troponin  - telemetry  - asa, statin  - TSH, HbA1c, lipids  - lexiscan in AM    Chronic conditions as noted above/below; home medications reviewed personally by me and restarted as appropriate  Electrolyte derangement:  Trending BMP; Mg; replacement prn  DVT ppx: lovenox   FULL CODE      - The above conditions include an acute and/or chronic illness that poses a threat to life (or bodily function).  - Previous notes/encounters/external records reviewed personally by me.  - Pt's case personally discussed with another physician:  ER physician    Marilu Tubbs MD  [facility] Hospitalist  04/09/2023

## 2023-04-09 NOTE — ASSESSMENT & PLAN NOTE
Patient encouraged to follow-up with dermatologist for recommended screening and further management of right facial carcinoma.

## 2023-04-09 NOTE — ED PROVIDER NOTES
"Encounter Date: 4/8/2023       History     Chief Complaint   Patient presents with    Chest Pain     Patient c/o of intermittent chest pain x 1 week. Patient states that pain worsened today. He took a one dose of nitro around 1100 this morning, pain was relieved. Pain returned, he took another dose of nitro about 30-45 minutes ago. States pain has not completely gone away.     Patient presents emergency department with reported chest pain x1 week intermittent worsened today he did have nitro has took this morning with relief states required another dose of nitro proximally 45 minutes ago pain is better but it is not resolved completely patient has a history of coronary disease states his last heart attack was a couple years ago he has had stents in the past unfortunately patient has been lost to follow-up has been off of his medications for "too long" patient does continue to smoke he denies any cough denies any fever chills no antecedent illness no reported nausea or diaphoresis      Review of patient's allergies indicates:  No Known Allergies  Past Medical History:   Diagnosis Date    CAD (coronary artery disease)     Ethanolism     Heart attack     Hypertension     Smoker      Past Surgical History:   Procedure Laterality Date    ANGIOGRAM, CORONARY, WITH LEFT HEART CATHETERIZATION N/A 1/28/2022    Procedure: Angiogram, Coronary, with Left Heart Cath;  Surgeon: Nacho Orellana MD;  Location: [facility] CATH/EP LAB;  Service: Cardiology;  Laterality: N/A;    FACIAL COSMETIC SURGERY      PERCUTANEOUS CORONARY INTERVENTION (PCI) FOR CHRONIC TOTAL OCCLUSION OF CORONARY ARTERY       Family History   Problem Relation Age of Onset    Heart disease Father      Social History     Tobacco Use    Smoking status: Every Day     Packs/day: 1.00     Types: Cigarettes   Substance Use Topics    Alcohol use: Yes     Comment: 1/4 bottle whiskey every day    Drug use: Yes     Types: Marijuana     Review of Systems   Constitutional:  " Negative for chills, diaphoresis and fever.   HENT:  Negative for congestion.    Respiratory:  Positive for shortness of breath. Negative for cough.    Cardiovascular:  Positive for chest pain. Negative for palpitations and leg swelling.   Gastrointestinal:  Negative for abdominal pain and nausea.   Genitourinary:  Negative for dysuria.   All other systems reviewed and are negative.    Physical Exam     Initial Vitals [04/08/23 2020]   BP Pulse Resp Temp SpO2   (!) 166/94 76 20 97.8 °F (36.6 °C) 98 %      MAP       --         Physical Exam    Constitutional: He appears well-developed and well-nourished. No distress.   HENT:   Head: Normocephalic and atraumatic.   Right Ear: External ear normal.   Left Ear: External ear normal.   Mouth/Throat: Oropharynx is clear and moist.   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   Normal range of motion.  Cardiovascular:  Normal rate, regular rhythm, S1 normal, S2 normal, normal heart sounds and intact distal pulses.           Pulmonary/Chest: Breath sounds normal.   Abdominal: Abdomen is soft. Bowel sounds are normal. There is no abdominal tenderness.   Musculoskeletal:         General: Normal range of motion.      Cervical back: Normal range of motion and neck supple.     Neurological: He is alert and oriented to person, place, and time. He has normal strength. GCS score is 15. GCS eye subscore is 4. GCS verbal subscore is 5. GCS motor subscore is 6.   Skin: Skin is warm and dry. Capillary refill takes less than 2 seconds. No rash noted.   Psychiatric: He has a normal mood and affect. His behavior is normal.       ED Course   Procedures  Labs Reviewed   CBC W/ AUTO DIFFERENTIAL - Abnormal; Notable for the following components:       Result Value    RBC 4.50 (*)     MCV 99 (*)     MCH 32.4 (*)     All other components within normal limits   COMPREHENSIVE METABOLIC PANEL - Abnormal; Notable for the following components:    Glucose 126 (*)     Anion  Gap 5 (*)     All other components within normal limits   TROPONIN I HIGH SENSITIVITY - Abnormal; Notable for the following components:    Troponin I High Sensitivity 23.7 (*)     All other components within normal limits   B-TYPE NATRIURETIC PEPTIDE   TROPONIN I HIGH SENSITIVITY   PROTIME-INR          Imaging Results              X-Ray Chest AP Portable (In process)                      Medications   hydrALAZINE injection 10 mg (has no administration in time range)   hydrALAZINE injection 5 mg (has no administration in time range)   aspirin chewable tablet 81 mg (has no administration in time range)   atorvastatin tablet 40 mg (has no administration in time range)   sodium chloride 0.9% flush 10 mL (has no administration in time range)   albuterol-ipratropium 2.5 mg-0.5 mg/3 mL nebulizer solution 3 mL (has no administration in time range)   melatonin tablet 6 mg (has no administration in time range)   ondansetron injection 4 mg (has no administration in time range)   polyethylene glycol packet 17 g (has no administration in time range)   senna-docusate 8.6-50 mg per tablet 1 tablet (has no administration in time range)   acetaminophen tablet 650 mg (has no administration in time range)   simethicone chewable tablet 80 mg (has no administration in time range)   HYDROcodone-acetaminophen 5-325 mg per tablet 1 tablet (has no administration in time range)   morphine injection 4 mg (has no administration in time range)   naloxone 0.4 mg/mL injection 0.02 mg (has no administration in time range)   glucose chewable tablet 16 g (has no administration in time range)   glucose chewable tablet 24 g (has no administration in time range)   dextrose 50% injection 12.5 g (has no administration in time range)   dextrose 50% injection 25 g (has no administration in time range)   glucagon (human recombinant) injection 1 mg (has no administration in time range)   enoxaparin injection 40 mg (has no administration in time range)    nitroGLYCERIN SL tablet 0.4 mg (0.4 mg Sublingual Given 4/9/23 0050)   aspirin EC tablet 325 mg (325 mg Oral Given 4/8/23 2217)   nitroGLYCERIN 2% TD oint ointment 0.5 inch (0.5 inches Topical (Top) Given 4/8/23 2217)     Medical Decision Making:   Differential Diagnosis:   Myocardial infarction, angina, acute coronary syndrome, pneumonia  Clinical Tests:   Lab Tests: Ordered and Reviewed  Radiological Study: Ordered and Reviewed  Medical Tests: Ordered and Reviewed  ED Management:  Laboratory evaluation reviewed patient received aspirin and nitroglycerin in the emergency department with improvement of his chest pain of discussed patient's findings with Hospital Medicine evaluated patient in the ER for admission                        Clinical Impression:   Final diagnoses:  [R07.9] Chest pain        ED Disposition Condition    Observation                 Dagoberto Garcia MD  04/09/23 6699

## 2023-04-09 NOTE — CONSULTS
[facility]  Department of Cardiology  Consult Note      PATIENT NAME: Stuart Adler    MRN: [id number]  TODAY'S DATE: 04/09/2023  ADMIT DATE: 4/8/2023                          CONSULT REQUESTED BY: Cassius Duarte MD    SUBJECTIVE     PRINCIPAL PROBLEM: Chest pain      REASON FOR CONSULT:  Intermittent chest pains      HPI:  Mr. 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  He presents to the emergency room complaining of intermittent episodes of chest pain that has progressively worsened nonradiating not associated with nausea vomiting or diaphoresis.  His last coronary arteriogram performed on 02/02/2022 done by Dr. Orellana revealed 60% stenosis at the ostium of the 2nd diagonal 40% stenosis at the ostium of the circumflex followed by an 80% stenosis of the obtuse marginal branch treated with a drug-eluting stent 2.25 x 15 mm.  He had 2 patent stents in the right coronary artery with 30% stenosis distal to the stent.        Review of patient's allergies indicates:  No Known Allergies    Past Medical History:   Diagnosis Date    CAD (coronary artery disease)     Ethanolism     Heart attack     Hypertension     Smoker      Past Surgical History:   Procedure Laterality Date    ANGIOGRAM, CORONARY, WITH LEFT HEART CATHETERIZATION N/A 1/28/2022    Procedure: Angiogram, Coronary, with Left Heart Cath;  Surgeon: Nacho Orellana MD;  Location: [facility] CATH/EP LAB;  Service: Cardiology;  Laterality: N/A;    FACIAL COSMETIC SURGERY      PERCUTANEOUS CORONARY INTERVENTION (PCI) FOR CHRONIC TOTAL OCCLUSION OF CORONARY ARTERY       Social History     Tobacco Use    Smoking status: Every Day     Packs/day: 1.00     Types: Cigarettes   Substance Use Topics    Alcohol use: Yes     Comment: 1/4 bottle whiskey  every day    Drug use: Yes     Types: Marijuana        REVIEW OF SYSTEMS  CONSTITUTIONAL: Negative for chills, fatigue and fever.   EYES: No double vision, No blurred vision  NEURO: No headaches, No dizziness  RESPIRATORY:  He complains of PNDs and mild orthopnea CARDIOVASCULAR:  Positive for chest pains  GI:  He has occasional abdominal cramps.   : Negative for dysuria and frequency, Negative for hematuria  SKIN: Negative for bruising, Negative for edema or discoloration noted.     OBJECTIVE     VITAL SIGNS (Most Recent)  Temp: 98 °F (36.7 °C) (04/09/23 0709)  Pulse: 62 (04/09/23 0914)  Resp: 18 (04/09/23 0914)  BP: (!) 183/103 (04/09/23 0914)  SpO2: 96 % (04/09/23 0914)    VENTILATION STATUS  Resp: 18 (04/09/23 0914)  SpO2: 96 % (04/09/23 0914)           I & O (Last 24H):No intake or output data in the 24 hours ending 04/09/23 1054    WEIGHTS  Wt Readings from Last 1 Encounters:   04/09/23 0150 130.8 kg (288 lb 6.4 oz)   04/08/23 2020 113.4 kg (250 lb)       PHYSICAL EXAM  GENERAL:  Overweight male in not acute distress  HEENT:  He has a right facial scar from previous surgery with growing carcinoma in the upper section of the scar  NECK: No JVD. No bruit..   THYROID: Thyroid not enlarged. No nodules present..   CARDIAC: Regular rate and rhythm. S1 is normal.S2 is normal.No gallops, clicks or murmurs noted at this time.  CHEST ANATOMY: normal.   LUNGS: Clear to auscultation. No wheezing or rhonchi..   ABDOMEN: Soft no masses or organomegaly.  No abdomen pulsations or bruits.  Normal bowel sounds. No pulsations and no masses felt, No guarding or rebound.   URINARY: No calero catheter   EXTREMITIES: No cyanosis, clubbing or edema noted at this time., no calf tenderness bilaterally.   CENTRAL NERVOUS SYSTEM: No focal motor or sensory deficits noted.   MUSCLE STRENGTH & TONE: No noteable weakness, atrophy or abnormal movement.     HOME MEDICATIONS:  No current facility-administered medications on file prior to  encounter.     Current Outpatient Medications on File Prior to Encounter   Medication Sig Dispense Refill    amLODIPine (NORVASC) 5 MG tablet TAKE 1 TABLET(5 MG) BY MOUTH EVERY DAY. 90 tablet 0    aspirin 81 MG Chew CHEW AND SWALLOW 1 TABLET(81 MG) BY MOUTH EVERY DAY 90 tablet 0    atorvastatin (LIPITOR) 80 MG tablet Take 0.5 tablets (40 mg total) by mouth once daily. 90 tablet 0    clopidogreL (PLAVIX) 75 mg tablet TAKE 1 TABLET(75 MG) BY MOUTH EVERY DAY 90 tablet 0    lisinopriL (PRINIVIL,ZESTRIL) 5 MG tablet TAKE 1 TABLET(5 MG) BY MOUTH EVERY DAY 90 tablet 0    metoprolol tartrate (LOPRESSOR) 25 MG tablet Take 1 tablet (25 mg total) by mouth 2 (two) times daily. 180 tablet 3    nitroGLYCERIN (NITROSTAT) 0.4 MG SL tablet Place 1 tablet (0.4 mg total) under the tongue every 5 (five) minutes as needed for Chest pain. 14 tablet 0       SCHEDULED MEDS:   amLODIPine  5 mg Oral Daily    aspirin  81 mg Oral Daily    atorvastatin  40 mg Oral Daily    clopidogreL  75 mg Oral Daily    enoxaparin  40 mg Subcutaneous Daily    lisinopriL  5 mg Oral Daily    metoprolol tartrate  25 mg Oral BID       CONTINUOUS INFUSIONS:    PRN MEDS:acetaminophen, albuterol-ipratropium, dextrose 50%, dextrose 50%, glucagon (human recombinant), glucose, glucose, hydrALAZINE, hydrALAZINE, HYDROcodone-acetaminophen, melatonin, morphine, naloxone, nitroGLYCERIN, ondansetron, polyethylene glycol, senna-docusate 8.6-50 mg, simethicone, sodium chloride 0.9%    LABS AND DIAGNOSTICS     CBC LAST 3 DAYS  Recent Labs   Lab 04/08/23 2028 04/09/23  0154   WBC 7.90 7.23   RBC 4.50* 4.84   HGB 14.6 15.8   HCT 44.5 47.6   MCV 99* 98   MCH 32.4* 32.6*   MCHC 32.8 33.2   RDW 12.3 12.3    154   MPV 10.5 10.6   GRAN 70.3  5.6 68.6  5.0   LYMPH 19.1  1.5 21.0  1.5   MONO 7.3  0.6 6.9  0.5   BASO 0.03 0.05   NRBC 0 0       COAGULATION LAST 3 DAYS  Recent Labs   Lab 04/08/23 2028   INR 0.9       CHEMISTRY LAST 3 DAYS  Recent Labs   Lab 04/08/23 2028  04/09/23  0154    141   K 4.0 4.5    106   CO2 26 26   ANIONGAP 5* 9   BUN 14 14   CREATININE 0.9 0.9   * 99   CALCIUM 9.1 9.1   MG  --  2.1   ALBUMIN 3.5  --    PROT 6.7  --    ALKPHOS 73  --    ALT 16  --    AST 17  --    BILITOT 0.4  --        CARDIAC PROFILE LAST 3 DAYS  Recent Labs   Lab 04/08/23 2028 04/08/23 2244 04/09/23  0154   BNP 21  --   --    TROPONINIHS 14.0 23.7* 25.0*       ENDOCRINE LAST 3 DAYS  Recent Labs   Lab 04/09/23  0154   TSH 1.480       LAST ARTERIAL BLOOD GAS  ABG  No results for input(s): PH, PO2, PCO2, HCO3, BE in the last 168 hours.    LAST 7 DAYS MICROBIOLOGY   Microbiology Results (last 7 days)       ** No results found for the last 168 hours. **            MOST RECENT IMAGING  X-Ray Chest AP Portable  Narrative: EXAMINATION:  XR CHEST AP PORTABLE    CLINICAL HISTORY:  chest pain;    FINDINGS:  Portable chest at 2035 compared with 01/27/2022 shows normal cardiomediastinal silhouette.    Lungs are clear. Pulmonary vasculature is normal. No acute osseous abnormality.  Impression: No acute cardiopulmonary abnormality.    Electronically signed by: Addison Hernandez MD  Date:    04/09/2023  Time:    09:04      ECHOCARDIOGRAM RESULTS (last 5)  Results for orders placed during the hospital encounter of 01/26/21    Echo Color Flow Doppler? Yes    Interpretation Summary  · The left ventricle is normal in size with concentric remodeling and normal systolic function. The estimated ejection fraction is 70%  · Indeterminate left ventricular diastolic function.  · Normal right ventricular size with normal right ventricular systolic function.  · The aortic root is mildly dilated.  · There is mild pulmonary hypertension.      CURRENT/PREVIOUS VISIT EKG  Results for orders placed or performed during the hospital encounter of 04/08/23   EKG 12-lead    Collection Time: 04/08/23  8:22 PM    Narrative    Test Reason : R07.9,    Vent. Rate : 069 BPM     Atrial Rate : 069 BPM     P-R Int :  132 ms          QRS Dur : 084 ms      QT Int : 418 ms       P-R-T Axes : 064 037 050 degrees     QTc Int : 447 ms    Normal sinus rhythm  Normal ECG  When compared with ECG of 28-JAN-2022 12:47,  T wave inversion no longer evident in Inferior leads    Referred By: HUNTER   SELF           Confirmed By:            ASSESSMENT/PLAN:     Active Hospital Problems    Diagnosis    *Chest pain    Uncontrolled hypertension    Skin carcinoma for right hemiface    Smoker    Non-compliance    CAD (coronary artery disease)       ASSESSMENT & PLAN:   Chest pain on this patient that has known coronary artery disease although he has been noncompliant with his medications.  His blood pressure is extremely elevated.  His troponin is borderline elevated.    Uncontrolled hypertension due to noncompliance    Coronary artery disease with the last coronary arteriogram done on 02/02/2022 revealed 60% stenosis of the 2nd diagonal.  40% stenosis at the ostial circumflex 80% stenosis of the obtuse marginal branch treated with a 2.25 x 15 mm drug-eluting stent tamera.  Patent stents in the right coronary artery with a 30% stenosis of the distal RCA.    Noncompliance    Smoker    Carcinoma of the face      RECOMMENDATIONS:  Control his blood pressure  Treatment for his cholesterol  Encouraged to take his medications  Discontinue smoking  Perform a pharmacological myocardial perfusion scan in a.m.        Dequan Helms MD  Date of Service: 04/09/2023  10:54 AM

## 2023-04-09 NOTE — PROGRESS NOTES
[facility] Medicine  Progress Note    Patient Name: Stuart Adler  MRN: [id number]  Patient Class: OP- Observation   Admission Date: 4/8/2023  Length of Stay: 0 days  Attending Physician: Cassius Duarte MD  Primary Care Provider: Primary Doctor No        Subjective:     Principal Problem:Chest pain        HPI:  Stuart Adler is a 62 y.o. White male   With PMH of CAD, HTN, smoking,  who presents with chest pain.     Located substernal, no radiation  Occurring intermittently  Moderate severity  Onset 1 week ago  Progressively worsening  Much worse today  Initally alleviated with nitroglycerin  Then CP returned  Next dose nitro did not fully improve his symptoms  This prompted ER presentation     Pt given asa and nitro in the ER  No SOB  No diaphoresis  No n/v  No LE edema     Pt noncompliant with medication and folllow-up  Ran out of meds except nitroglycerin a long time ago    Overview/Hospital Course:  No notes on file    Interval History:  Patient is reporting intermittent substernal chest pain for the past week without any associated diaphoresis and nausea.  Patient reporting ongoing tobacco cigarette smoking more than 1 pack per day.  Patient admits noncompliance with medications and has not taken any medication for few months.  Patient also reporting 3-4 glasses of whiskey daily.    Review of Systems   Constitutional:  Negative for chills and fever.   HENT:  Negative for congestion and sore throat.    Eyes:  Negative for visual disturbance.   Respiratory:  Negative for cough and shortness of breath.    Cardiovascular:  Positive for chest pain. Negative for palpitations.   Gastrointestinal:  Negative for abdominal pain, constipation, diarrhea, nausea and vomiting.   Endocrine: Negative for cold intolerance and heat intolerance.   Genitourinary:  Negative for dysuria and hematuria.   Musculoskeletal:  Negative for arthralgias and myalgias.   Skin:  Negative for rash.   Neurological:   Negative for tremors and seizures.   Hematological:  Negative for adenopathy. Does not bruise/bleed easily.   Objective:     Vital Signs (Most Recent):  Temp: 98 °F (36.7 °C) (04/09/23 0709)  Pulse: 72 (04/09/23 0709)  Resp: 20 (04/09/23 0709)  BP: (!) 163/108 (04/09/23 0709)  SpO2: 95 % (04/09/23 0709)   Vital Signs (24h Range):  Temp:  [97.8 °F (36.6 °C)-98 °F (36.7 °C)] 98 °F (36.7 °C)  Pulse:  [64-76] 72  Resp:  [12-20] 20  SpO2:  [95 %-98 %] 95 %  BP: (129-166)/() 163/108     Weight: 130.8 kg (288 lb 6.4 oz)  Body mass index is 37.03 kg/m².  No intake or output data in the 24 hours ending 04/09/23 0735   Physical Exam  Vitals and nursing note reviewed.   Constitutional:       Appearance: Normal appearance. He is well-developed.   HENT:      Head: Normocephalic and atraumatic.      Nose: Nose normal. No septal deviation.   Eyes:      Conjunctiva/sclera: Conjunctivae normal.      Pupils: Pupils are equal, round, and reactive to light.   Neck:      Thyroid: No thyroid mass.      Vascular: No JVD.      Trachea: No tracheal tenderness or tracheal deviation.   Cardiovascular:      Rate and Rhythm: Normal rate and regular rhythm.      Heart sounds: S1 normal and S2 normal. No murmur heard.    No friction rub. No gallop.   Pulmonary:      Effort: Pulmonary effort is normal.      Breath sounds: Normal breath sounds. No decreased breath sounds, wheezing, rhonchi or rales.   Abdominal:      General: Bowel sounds are normal. There is no distension.      Palpations: Abdomen is soft. There is no hepatomegaly, splenomegaly or mass.      Tenderness: There is no abdominal tenderness.   Skin:     General: Skin is warm.      Findings: No rash.   Neurological:      General: No focal deficit present.      Mental Status: He is alert and oriented to person, place, and time.      Cranial Nerves: No cranial nerve deficit.      Sensory: No sensory deficit.   Psychiatric:         Mood and Affect: Mood normal.         Behavior:  Behavior normal.       Significant Labs: All pertinent labs within the past 24 hours have been reviewed.  CBC:   Recent Labs   Lab 04/08/23 2028 04/09/23 0154   WBC 7.90 7.23   HGB 14.6 15.8   HCT 44.5 47.6    154     CMP:   Recent Labs   Lab 04/08/23 2028 04/09/23 0154    141   K 4.0 4.5    106   CO2 26 26   * 99   BUN 14 14   CREATININE 0.9 0.9   CALCIUM 9.1 9.1   PROT 6.7  --    ALBUMIN 3.5  --    BILITOT 0.4  --    ALKPHOS 73  --    AST 17  --    ALT 16  --    ANIONGAP 5* 9     Coagulation:   Recent Labs   Lab 04/08/23 2028   INR 0.9     Lipid Panel:   Recent Labs   Lab 04/09/23 0154   CHOL 233*   HDL 43   LDLCALC 169.0*   TRIG 105   CHOLHDL 18.5*   Troponin hs: 23.7 ->25.  Microbiology Results (last 7 days)       ** No results found for the last 168 hours. **          Significant Imaging:   ECHO: Pending.     Lexiscan: Pending.     CXR: Pending.          Assessment/Plan:      * Chest pain  Supplemental O2 via nasal canula; titrate O2 saturation to >92%.  Serial Cardiac enzymes × 3.  Tele-monitoring.   Cardiology Consultation.  Lexiscan.  LDL is 169.  Start Lipitor 40 mg p.o. q.day. Side effect profile and medication compliance reviewed with the patient.  Use Nitroglycerine PRN Chest pain.  Na restriction <2g/d.        Skin carcinoma for right hemiface  Patient encouraged to follow-up with dermatologist for recommended screening and further management of right facial carcinoma.      Uncontrolled hypertension  Resume amlodipine, metoprolol and lisinopril use.  Use intravenous hydralazine 10 mg IV Q 2 hours p.r.n. for systolic blood pressure above 160 mmHg.      CAD (coronary artery disease)  Patient with known CAD and monitor for S/Sx of angina/ACS. Continue to monitor on telemetry.        Non-compliance  Patient counseled regarding medication and dietry compliance.      Smoker  Smoking cessation counseling performed. Dangers of cigarette smoking were reviewed with patient in  detail and patient was encouraged to quit. Nicotine replacement options were discussed for > 10 minutes.          VTE Risk Mitigation (From admission, onward)           Ordered     enoxaparin injection 40 mg  Daily         04/09/23 0028     IP VTE HIGH RISK PATIENT  Once         04/09/23 0028     Place sequential compression device  Until discontinued         04/09/23 0028                    Discharge Planning   CLARKE:  4/10/23    Code Status: Full Code   Is the patient medically ready for discharge?:     Reason for patient still in hospital (select all that apply): Patient trending condition and Consult recommendations                     Cassius Duarte MD  Department of Hospital Medicine   [facility]

## 2023-04-09 NOTE — ASSESSMENT & PLAN NOTE
Supplemental O2 via nasal canula; titrate O2 saturation to >92%.  Serial Cardiac enzymes × 3.  Tele-monitoring.   Cardiology Consultation.  Lexiscan.  LDL is 169.  Start Lipitor 40 mg p.o. q.day. Side effect profile and medication compliance reviewed with the patient.  Use Nitroglycerine PRN Chest pain.  Na restriction <2g/d.

## 2023-04-10 ENCOUNTER — CLINICAL SUPPORT (OUTPATIENT)
Dept: CARDIOLOGY | Facility: HOSPITAL | Age: 63
End: 2023-04-10
Attending: FAMILY MEDICINE
Payer: MEDICARE

## 2023-04-10 ENCOUNTER — HOSPITAL ENCOUNTER (OUTPATIENT)
Dept: RADIOLOGY | Facility: HOSPITAL | Age: 63
Discharge: HOME | End: 2023-04-10
Attending: FAMILY MEDICINE
Payer: MEDICARE

## 2023-04-10 VITALS — WEIGHT: 288 LBS | HEIGHT: 74 IN | BODY MASS INDEX: 36.96 KG/M2

## 2023-04-10 LAB
ANION GAP SERPL CALC-SCNC: 5 MMOL/L (ref 8–16)
AORTIC ROOT ANNULUS: 3.2 CM
AORTIC VALVE CUSP SEPERATION: 2.3 CM
AV INDEX (PROSTH): 0.79
AV MEAN GRADIENT: 4 MMHG
AV PEAK GRADIENT: 7 MMHG
AV VALVE AREA: 4.53 CM2
AV VELOCITY RATIO: 0.75
BASOPHILS # BLD AUTO: 0.04 K/UL (ref 0–0.2)
BASOPHILS NFR BLD: 0.5 % (ref 0–1.9)
BSA FOR ECHO PROCEDURE: 2.61 M2
BUN SERPL-MCNC: 15 MG/DL (ref 8–23)
CALCIUM SERPL-MCNC: 9.1 MG/DL (ref 8.7–10.5)
CHLORIDE SERPL-SCNC: 108 MMOL/L (ref 95–110)
CO2 SERPL-SCNC: 25 MMOL/L (ref 23–29)
CREAT SERPL-MCNC: 0.8 MG/DL (ref 0.5–1.4)
CV ECHO LV RWT: 0.5 CM
CV PHARM DOSE: 0.4 MG
CV STRESS BASE HR: 62 BPM
DIASTOLIC BLOOD PRESSURE: 83 MMHG
DIFFERENTIAL METHOD: ABNORMAL
DOP CALC AO PEAK VEL: 1.29 M/S
DOP CALC AO VTI: 28.4 CM
DOP CALC LVOT AREA: 5.7 CM2
DOP CALC LVOT DIAMETER: 2.7 CM
DOP CALC LVOT PEAK VEL: 0.97 M/S
DOP CALC LVOT STROKE VOLUME: 128.76 CM3
DOP CALC MV VTI: 28.5 CM
DOP CALCLVOT PEAK VEL VTI: 22.5 CM
E WAVE DECELERATION TIME: 246 MSEC
E/A RATIO: 0.88
E/E' RATIO: 8.8 M/S
ECHO LV POSTERIOR WALL: 1.22 CM (ref 0.6–1.1)
EJECTION FRACTION: 60 %
EOSINOPHIL # BLD AUTO: 0.2 K/UL (ref 0–0.5)
EOSINOPHIL NFR BLD: 2 % (ref 0–8)
ERYTHROCYTE [DISTWIDTH] IN BLOOD BY AUTOMATED COUNT: 12.3 % (ref 11.5–14.5)
EST. GFR  (NO RACE VARIABLE): >60 ML/MIN/1.73 M^2
FRACTIONAL SHORTENING: 32 % (ref 28–44)
GLUCOSE SERPL-MCNC: 105 MG/DL (ref 70–110)
HCT VFR BLD AUTO: 45.1 % (ref 40–54)
HGB BLD-MCNC: 15.2 G/DL (ref 14–18)
IMM GRANULOCYTES # BLD AUTO: 0.03 K/UL (ref 0–0.04)
IMM GRANULOCYTES NFR BLD AUTO: 0.4 % (ref 0–0.5)
INTERVENTRICULAR SEPTUM: 1.22 CM (ref 0.6–1.1)
IVRT: 127 MSEC
LEFT ATRIUM VOLUME INDEX MOD: 23.2 ML/M2
LEFT ATRIUM VOLUME MOD: 58.9 CM3
LEFT INTERNAL DIMENSION IN SYSTOLE: 3.32 CM (ref 2.1–4)
LEFT VENTRICLE DIASTOLIC VOLUME INDEX: 44.49 ML/M2
LEFT VENTRICLE DIASTOLIC VOLUME: 113 ML
LEFT VENTRICLE MASS INDEX: 92 G/M2
LEFT VENTRICLE SYSTOLIC VOLUME INDEX: 17.6 ML/M2
LEFT VENTRICLE SYSTOLIC VOLUME: 44.8 ML
LEFT VENTRICULAR INTERNAL DIMENSION IN DIASTOLE: 4.91 CM (ref 3.5–6)
LEFT VENTRICULAR MASS: 232.47 G
LV LATERAL E/E' RATIO: 8.25 M/S
LV SEPTAL E/E' RATIO: 9.43 M/S
LVOT MG: 2 MMHG
LVOT MV: 0.6 CM/S
LYMPHOCYTES # BLD AUTO: 1.2 K/UL (ref 1–4.8)
LYMPHOCYTES NFR BLD: 14.7 % (ref 18–48)
MAGNESIUM SERPL-MCNC: 2 MG/DL (ref 1.6–2.6)
MCH RBC QN AUTO: 32.9 PG (ref 27–31)
MCHC RBC AUTO-ENTMCNC: 33.7 G/DL (ref 32–36)
MCV RBC AUTO: 98 FL (ref 82–98)
MONOCYTES # BLD AUTO: 0.7 K/UL (ref 0.3–1)
MONOCYTES NFR BLD: 8.2 % (ref 4–15)
MV MEAN GRADIENT: 1 MMHG
MV PEAK A VEL: 0.75 M/S
MV PEAK E VEL: 0.66 M/S
MV PEAK GRADIENT: 3 MMHG
MV STENOSIS PRESSURE HALF TIME: 78 MS
MV VALVE AREA BY CONTINUITY EQUATION: 4.52 CM2
MV VALVE AREA P 1/2 METHOD: 2.82 CM2
NEUTROPHILS # BLD AUTO: 6.2 K/UL (ref 1.8–7.7)
NEUTROPHILS NFR BLD: 74.2 % (ref 38–73)
NRBC BLD-RTO: 0 /100 WBC
OHS CV CPX 1 MINUTE RECOVERY HEART RATE: 82 BPM
OHS CV CPX 85 PERCENT MAX PREDICTED HEART RATE MALE: 134
OHS CV CPX MAX PREDICTED HEART RATE: 158
OHS CV CPX PATIENT IS FEMALE: 0
OHS CV CPX PATIENT IS MALE: 1
OHS CV CPX PEAK DIASTOLIC BLOOD PRESSURE: 102 MMHG
OHS CV CPX PEAK HEAR RATE: 82 BPM
OHS CV CPX PEAK RATE PRESSURE PRODUCT: NORMAL
OHS CV CPX PEAK SYSTOLIC BLOOD PRESSURE: 171 MMHG
OHS CV CPX PERCENT MAX PREDICTED HEART RATE ACHIEVED: 52
OHS CV CPX RATE PRESSURE PRODUCT PRESENTING: 9052
PLATELET # BLD AUTO: 187 K/UL (ref 150–450)
PMV BLD AUTO: 10.3 FL (ref 9.2–12.9)
POTASSIUM SERPL-SCNC: 4.4 MMOL/L (ref 3.5–5.1)
PV MV: 0.64 M/S
PV PEAK VELOCITY: 0.78 CM/S
RA MAJOR: 5.15 CM
RA PRESSURE: 3 MMHG
RA WIDTH: 3.76 CM
RBC # BLD AUTO: 4.62 M/UL (ref 4.6–6.2)
RIGHT VENTRICULAR END-DIASTOLIC DIMENSION: 3.27 CM
RIGHT VENTRICULAR LENGTH IN DIASTOLE (APICAL 4-CHAMBER VIEW): 7.06 CM
RV TISSUE DOPPLER FREE WALL SYSTOLIC VELOCITY 1 (APICAL 4 CHAMBER VIEW): 0.02 CM/S
SODIUM SERPL-SCNC: 138 MMOL/L (ref 136–145)
SYSTOLIC BLOOD PRESSURE: 146 MMHG
TDI LATERAL: 0.08 M/S
TDI SEPTAL: 0.07 M/S
TDI: 0.08 M/S
TRICUSPID ANNULAR PLANE SYSTOLIC EXCURSION: 3.73 CM
WBC # BLD AUTO: 8.41 K/UL (ref 3.9–12.7)

## 2023-04-10 PROCEDURE — 25000003 PHARM REV CODE 250: Performed by: NURSE PRACTITIONER

## 2023-04-10 PROCEDURE — 94761 N-INVAS EAR/PLS OXIMETRY MLT: CPT

## 2023-04-10 PROCEDURE — 99499 UNLISTED E&M SERVICE: CPT | Mod: ,,, | Performed by: GENERAL PRACTICE

## 2023-04-10 PROCEDURE — 96372 THER/PROPH/DIAG INJ SC/IM: CPT | Mod: 59 | Performed by: FAMILY MEDICINE

## 2023-04-10 PROCEDURE — 63600175 PHARM REV CODE 636 W HCPCS: Performed by: FAMILY MEDICINE

## 2023-04-10 PROCEDURE — 78452 HT MUSCLE IMAGE SPECT MULT: CPT | Mod: TC

## 2023-04-10 PROCEDURE — 93018 NUCLEAR STRESS TEST (CUPID ONLY): ICD-10-PCS | Mod: ,,, | Performed by: GENERAL PRACTICE

## 2023-04-10 PROCEDURE — 93016 CV STRESS TEST SUPVJ ONLY: CPT | Mod: ,,, | Performed by: GENERAL PRACTICE

## 2023-04-10 PROCEDURE — 25000003 PHARM REV CODE 250: Performed by: INTERNAL MEDICINE

## 2023-04-10 PROCEDURE — 80048 BASIC METABOLIC PNL TOTAL CA: CPT | Performed by: FAMILY MEDICINE

## 2023-04-10 PROCEDURE — 83735 ASSAY OF MAGNESIUM: CPT | Performed by: FAMILY MEDICINE

## 2023-04-10 PROCEDURE — 36415 COLL VENOUS BLD VENIPUNCTURE: CPT | Performed by: FAMILY MEDICINE

## 2023-04-10 PROCEDURE — 25000003 PHARM REV CODE 250: Performed by: FAMILY MEDICINE

## 2023-04-10 PROCEDURE — A9502 TC99M TETROFOSMIN: HCPCS

## 2023-04-10 PROCEDURE — 93017 CV STRESS TEST TRACING ONLY: CPT

## 2023-04-10 PROCEDURE — G0378 HOSPITAL OBSERVATION PER HR: HCPCS

## 2023-04-10 PROCEDURE — 93018 CV STRESS TEST I&R ONLY: CPT | Mod: ,,, | Performed by: GENERAL PRACTICE

## 2023-04-10 PROCEDURE — 85025 COMPLETE CBC W/AUTO DIFF WBC: CPT | Performed by: FAMILY MEDICINE

## 2023-04-10 PROCEDURE — 93016 NUCLEAR STRESS TEST (CUPID ONLY): ICD-10-PCS | Mod: ,,, | Performed by: GENERAL PRACTICE

## 2023-04-10 PROCEDURE — 99499 NO LOS: ICD-10-PCS | Mod: ,,, | Performed by: GENERAL PRACTICE

## 2023-04-10 RX ORDER — CLOPIDOGREL BISULFATE 75 MG/1
75 TABLET ORAL DAILY
Qty: 30 TABLET | Refills: 0 | Status: SHIPPED | OUTPATIENT
Start: 2023-04-10 | End: 2023-04-17 | Stop reason: SDUPTHER

## 2023-04-10 RX ORDER — LISINOPRIL 10 MG/1
10 TABLET ORAL DAILY
Qty: 30 TABLET | Refills: 0 | Status: SHIPPED | OUTPATIENT
Start: 2023-04-10 | End: 2023-04-17 | Stop reason: SDUPTHER

## 2023-04-10 RX ORDER — AMLODIPINE BESYLATE 5 MG/1
5 TABLET ORAL DAILY
Qty: 30 TABLET | Refills: 0 | Status: SHIPPED | OUTPATIENT
Start: 2023-04-10 | End: 2023-04-17 | Stop reason: SDUPTHER

## 2023-04-10 RX ORDER — REGADENOSON 0.08 MG/ML
0.4 INJECTION, SOLUTION INTRAVENOUS
Status: COMPLETED | OUTPATIENT
Start: 2023-04-10 | End: 2023-04-10

## 2023-04-10 RX ORDER — ATORVASTATIN CALCIUM 80 MG/1
40 TABLET, FILM COATED ORAL DAILY
Qty: 15 TABLET | Refills: 0 | Status: SHIPPED | OUTPATIENT
Start: 2023-04-10 | End: 2023-04-17 | Stop reason: SDUPTHER

## 2023-04-10 RX ORDER — ISOSORBIDE MONONITRATE 30 MG/1
30 TABLET, EXTENDED RELEASE ORAL DAILY
Status: DISCONTINUED | OUTPATIENT
Start: 2023-04-10 | End: 2023-04-11 | Stop reason: HOSPADM

## 2023-04-10 RX ORDER — METOPROLOL TARTRATE 25 MG/1
25 TABLET, FILM COATED ORAL 2 TIMES DAILY
Qty: 60 TABLET | Refills: 0 | Status: SHIPPED | OUTPATIENT
Start: 2023-04-10 | End: 2023-04-17 | Stop reason: SDUPTHER

## 2023-04-10 RX ORDER — HYDRALAZINE HYDROCHLORIDE 20 MG/ML
10 INJECTION INTRAMUSCULAR; INTRAVENOUS EVERY 4 HOURS PRN
Status: DISCONTINUED | OUTPATIENT
Start: 2023-04-10 | End: 2023-04-11 | Stop reason: HOSPADM

## 2023-04-10 RX ADMIN — ENOXAPARIN SODIUM 40 MG: 100 INJECTION SUBCUTANEOUS at 04:04

## 2023-04-10 RX ADMIN — AMLODIPINE BESYLATE 5 MG: 5 TABLET ORAL at 08:04

## 2023-04-10 RX ADMIN — METOPROLOL TARTRATE 25 MG: 25 TABLET, FILM COATED ORAL at 08:04

## 2023-04-10 RX ADMIN — HYDROCODONE BITARTRATE AND ACETAMINOPHEN 1 TABLET: 5; 325 TABLET ORAL at 08:04

## 2023-04-10 RX ADMIN — REGADENOSON 0.4 MG: 0.08 INJECTION, SOLUTION INTRAVENOUS at 09:04

## 2023-04-10 RX ADMIN — CLOPIDOGREL BISULFATE 75 MG: 75 TABLET, FILM COATED ORAL at 08:04

## 2023-04-10 RX ADMIN — ATORVASTATIN CALCIUM 40 MG: 40 TABLET, FILM COATED ORAL at 08:04

## 2023-04-10 RX ADMIN — ASPIRIN 81 MG 81 MG: 81 TABLET ORAL at 08:04

## 2023-04-10 RX ADMIN — ISOSORBIDE MONONITRATE 30 MG: 30 TABLET, EXTENDED RELEASE ORAL at 11:04

## 2023-04-10 RX ADMIN — LISINOPRIL 5 MG: 5 TABLET ORAL at 08:04

## 2023-04-10 RX ADMIN — Medication 6 MG: at 09:04

## 2023-04-10 NOTE — PLAN OF CARE
04/10/23 1055   WATKINS Message   Medicare Outpatient and Observation Notification regarding financial responsibility Given to patient/caregiver;Explained to patient/caregiver   Date WATKINS was signed 04/10/23   Time WATKINS was signed 1055     WATKINS explained to pt. Pt verbalized understanding and signed form.

## 2023-04-10 NOTE — DISCHARGE INSTRUCTIONS
Stop tobacco cigarette smoking.    Please abstain from drinking alcohol.    Please regularly take medications as directed and follow-up with your providers on regular basis.    Keep blood pressure log daily.    Please follow-up with your dermatologist for regular surveillance of facial carcinoma.

## 2023-04-10 NOTE — PROGRESS NOTES
[facility]  Department of Cardiology  Consult Note      PATIENT NAME: Stuart Adler    MRN: [id number]  TODAY'S DATE: 04/10/2023  ADMIT DATE: 4/8/2023                          CONSULT REQUESTED BY: Cassius Duarte MD    SUBJECTIVE     PRINCIPAL PROBLEM: Chest pain        4/10/2023    Mr. Adler was seen and examined in the stress lab. He denies CP. He had some SOB last night. Did well during stress.          REASON FOR CONSULT:  Intermittent chest pains      HPI:  Mr. 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  He presents to the emergency room complaining of intermittent episodes of chest pain that has progressively worsened nonradiating not associated with nausea vomiting or diaphoresis.  His last coronary arteriogram performed on 02/02/2022 done by Dr. Orellana revealed 60% stenosis at the ostium of the 2nd diagonal 40% stenosis at the ostium of the circumflex followed by an 80% stenosis of the obtuse marginal branch treated with a drug-eluting stent 2.25 x 15 mm.  He had 2 patent stents in the right coronary artery with 30% stenosis distal to the stent.        Review of patient's allergies indicates:  No Known Allergies    Past Medical History:   Diagnosis Date    CAD (coronary artery disease)     Ethanolism     Heart attack     Hypertension     Smoker      Past Surgical History:   Procedure Laterality Date    ANGIOGRAM, CORONARY, WITH LEFT HEART CATHETERIZATION N/A 1/28/2022    Procedure: Angiogram, Coronary, with Left Heart Cath;  Surgeon: Nacho Orellana MD;  Location: [facility] CATH/EP LAB;  Service: Cardiology;  Laterality: N/A;    FACIAL COSMETIC SURGERY      PERCUTANEOUS CORONARY INTERVENTION (PCI) FOR CHRONIC TOTAL OCCLUSION OF CORONARY ARTERY       Social History     Tobacco Use     Smoking status: Every Day     Packs/day: 1.00     Types: Cigarettes   Substance Use Topics    Alcohol use: Yes     Comment: 1/4 bottle whiskey every day    Drug use: Yes     Types: Marijuana        REVIEW OF SYSTEMS  CONSTITUTIONAL: Negative for chills, fatigue and fever.   EYES: No double vision, No blurred vision  NEURO: No headaches, No dizziness  RESPIRATORY:  He complains of PNDs and mild orthopnea CARDIOVASCULAR:  Positive for chest pains  GI:  He has occasional abdominal cramps.   : Negative for dysuria and frequency, Negative for hematuria  SKIN: Negative for bruising, Negative for edema or discoloration noted.     OBJECTIVE     VITAL SIGNS (Most Recent)  Temp: 98.1 °F (36.7 °C) (04/10/23 0809)  Pulse: 60 (04/10/23 0809)  Resp: 18 (04/10/23 0809)  BP: (!) 148/87 (04/10/23 0809)  SpO2: (!) 60 % (04/10/23 0809)    VENTILATION STATUS  Resp: 18 (04/10/23 0809)  SpO2: (!) 60 % (04/10/23 0809)           I & O (Last 24H):  Intake/Output Summary (Last 24 hours) at 4/10/2023 1001  Last data filed at 4/10/2023 0343  Gross per 24 hour   Intake 480 ml   Output --   Net 480 ml       WEIGHTS  Wt Readings from Last 1 Encounters:   04/09/23 0150 130.8 kg (288 lb 6.4 oz)   04/08/23 2020 113.4 kg (250 lb)       PHYSICAL EXAM  GENERAL:  Overweight male in not acute distress  HEENT:  He has a right facial scar from previous surgery with growing carcinoma in the upper section of the scar  NECK: No JVD. No bruit..   THYROID: Thyroid not enlarged. No nodules present..   CARDIAC: Regular rate and rhythm. S1 is normal.S2 is normal.No gallops, clicks or murmurs noted at this time.  CHEST ANATOMY: normal.   LUNGS: Clear to auscultation. No wheezing or rhonchi..   ABDOMEN: Soft no masses or organomegaly.  No abdomen pulsations or bruits.  Normal bowel sounds. No pulsations and no masses felt, No guarding or rebound.   URINARY: No calero catheter   EXTREMITIES: No cyanosis, clubbing or edema noted at this time., no calf tenderness  bilaterally.   CENTRAL NERVOUS SYSTEM: No focal motor or sensory deficits noted.   MUSCLE STRENGTH & TONE: No noteable weakness, atrophy or abnormal movement.     HOME MEDICATIONS:  No current facility-administered medications on file prior to encounter.     Current Outpatient Medications on File Prior to Encounter   Medication Sig Dispense Refill    aspirin 81 MG Chew CHEW AND SWALLOW 1 TABLET(81 MG) BY MOUTH EVERY DAY 90 tablet 0    nitroGLYCERIN (NITROSTAT) 0.4 MG SL tablet Place 1 tablet (0.4 mg total) under the tongue every 5 (five) minutes as needed for Chest pain. 14 tablet 0    [DISCONTINUED] amLODIPine (NORVASC) 5 MG tablet TAKE 1 TABLET(5 MG) BY MOUTH EVERY DAY. 90 tablet 0    [DISCONTINUED] atorvastatin (LIPITOR) 80 MG tablet Take 0.5 tablets (40 mg total) by mouth once daily. 90 tablet 0    [DISCONTINUED] clopidogreL (PLAVIX) 75 mg tablet TAKE 1 TABLET(75 MG) BY MOUTH EVERY DAY 90 tablet 0    [DISCONTINUED] lisinopriL (PRINIVIL,ZESTRIL) 5 MG tablet TAKE 1 TABLET(5 MG) BY MOUTH EVERY DAY 90 tablet 0    [DISCONTINUED] metoprolol tartrate (LOPRESSOR) 25 MG tablet Take 1 tablet (25 mg total) by mouth 2 (two) times daily. 180 tablet 3       SCHEDULED MEDS:   amLODIPine  5 mg Oral Daily    aspirin  81 mg Oral Daily    atorvastatin  40 mg Oral Daily    clopidogreL  75 mg Oral Daily    enoxaparin  40 mg Subcutaneous Daily    lisinopriL  5 mg Oral Daily    metoprolol tartrate  25 mg Oral BID       CONTINUOUS INFUSIONS:    PRN MEDS:acetaminophen, albuterol-ipratropium, dextrose 50%, dextrose 50%, glucagon (human recombinant), glucose, glucose, hydrALAZINE, hydrALAZINE, HYDROcodone-acetaminophen, melatonin, morphine, naloxone, nitroGLYCERIN, ondansetron, polyethylene glycol, senna-docusate 8.6-50 mg, simethicone, sodium chloride 0.9%    LABS AND DIAGNOSTICS     CBC LAST 3 DAYS  Recent Labs   Lab 04/08/23 2028 04/09/23  0154 04/10/23  0338   WBC 7.90 7.23 8.41   RBC 4.50* 4.84 4.62   HGB 14.6 15.8 15.2   HCT 44.5  47.6 45.1   MCV 99* 98 98   MCH 32.4* 32.6* 32.9*   MCHC 32.8 33.2 33.7   RDW 12.3 12.3 12.3    154 187   MPV 10.5 10.6 10.3   GRAN 70.3  5.6 68.6  5.0 74.2*  6.2   LYMPH 19.1  1.5 21.0  1.5 14.7*  1.2   MONO 7.3  0.6 6.9  0.5 8.2  0.7   BASO 0.03 0.05 0.04   NRBC 0 0 0       COAGULATION LAST 3 DAYS  Recent Labs   Lab 04/08/23 2028   INR 0.9       CHEMISTRY LAST 3 DAYS  Recent Labs   Lab 04/08/23  2028 04/09/23  0154 04/10/23  0338    141 138   K 4.0 4.5 4.4    106 108   CO2 26 26 25   ANIONGAP 5* 9 5*   BUN 14 14 15   CREATININE 0.9 0.9 0.8   * 99 105   CALCIUM 9.1 9.1 9.1   MG  --  2.1 2.0   ALBUMIN 3.5  --   --    PROT 6.7  --   --    ALKPHOS 73  --   --    ALT 16  --   --    AST 17  --   --    BILITOT 0.4  --   --        CARDIAC PROFILE LAST 3 DAYS  Recent Labs   Lab 04/08/23 2028 04/08/23 2244 04/09/23 0154   BNP 21  --   --    TROPONINIHS 14.0 23.7* 25.0*       ENDOCRINE LAST 3 DAYS  Recent Labs   Lab 04/09/23  0154   TSH 1.480       LAST ARTERIAL BLOOD GAS  ABG  No results for input(s): PH, PO2, PCO2, HCO3, BE in the last 168 hours.    LAST 7 DAYS MICROBIOLOGY   Microbiology Results (last 7 days)       ** No results found for the last 168 hours. **            MOST RECENT IMAGING  X-Ray Chest AP Portable  Narrative: EXAMINATION:  XR CHEST AP PORTABLE    CLINICAL HISTORY:  chest pain;    FINDINGS:  Portable chest at 2035 compared with 01/27/2022 shows normal cardiomediastinal silhouette.    Lungs are clear. Pulmonary vasculature is normal. No acute osseous abnormality.  Impression: No acute cardiopulmonary abnormality.    Electronically signed by: Addison Hernandez MD  Date:    04/09/2023  Time:    09:04      ECHOCARDIOGRAM RESULTS (last 5)  Results for orders placed during the hospital encounter of 01/26/21    Echo Color Flow Doppler? Yes    Interpretation Summary  · The left ventricle is normal in size with concentric remodeling and normal systolic function. The estimated  ejection fraction is 70%  · Indeterminate left ventricular diastolic function.  · Normal right ventricular size with normal right ventricular systolic function.  · The aortic root is mildly dilated.  · There is mild pulmonary hypertension.      CURRENT/PREVIOUS VISIT EKG  Results for orders placed or performed during the hospital encounter of 04/08/23   EKG 12-lead    Collection Time: 04/08/23  8:22 PM    Narrative    Test Reason : R07.9,    Vent. Rate : 069 BPM     Atrial Rate : 069 BPM     P-R Int : 132 ms          QRS Dur : 084 ms      QT Int : 418 ms       P-R-T Axes : 064 037 050 degrees     QTc Int : 447 ms    Normal sinus rhythm  Normal ECG  When compared with ECG of 28-JAN-2022 12:47,  T wave inversion no longer evident in Inferior leads    Referred By: [email or web address]   SELF           Confirmed By:            ASSESSMENT/PLAN:     Active Hospital Problems    Diagnosis    *Chest pain    Uncontrolled hypertension    Skin carcinoma for right hemiface    Smoker    Non-compliance    CAD (coronary artery disease)       ASSESSMENT & PLAN:   Chest pain on this patient that has known coronary artery disease although he has been noncompliant with his medications.  His blood pressure is extremely elevated.  His troponin is borderline elevated.    Uncontrolled hypertension due to noncompliance    Coronary artery disease with the last coronary arteriogram done on 02/02/2022 revealed 60% stenosis of the 2nd diagonal.  40% stenosis at the ostial circumflex 80% stenosis of the obtuse marginal branch treated with a 2.25 x 15 mm drug-eluting stent tamera.  Patent stents in the right coronary artery with a 30% stenosis of the distal RCA.    Noncompliance    Smoker    Carcinoma of the face      RECOMMENDATIONS:        Await Myoview if positive will need angiographic assessment.  In the meantime continue Amlodipine, asa, statin, plavix, lisinopril and metoprolol at present dosing.  Add imdur 30 mg in the meantime.    Crystal Sierra,  NP  Date of Service: 04/10/2023  10:54 AM       Patient seen and examined.  No chest pain.  Myoview in progress.  The patient is not compliant with his medications still smoking.  He has invasive basal cell cancer receiving treatment still.  recommend medical management for now.

## 2023-04-10 NOTE — HOSPITAL COURSE
Patient was monitored on telemetry medical floor, serial cardiac enzymes remained negative. Patient was evaluated by cardiologist and scheduled for VIEOiscan Myoview.  It was a 2-day test. Patient was extensively counseled regarding importance of medication, dietary compliance and the need to regularly follow up with his providers including his dermatologist.  assisted with PCP appointment. Patient was instructed to keep BP log and was counseled to stop smoking cigarettes and consuming ETOH.  The Myoview came back with no reversible ischemia.      Physical Exam  Vitals and nursing note reviewed.   Constitutional:       Appearance: Normal appearance. He is well-developed.   Cardiovascular:      Rate and Rhythm: Normal rate and regular rhythm.      Heart sounds: S1 normal and S2 normal. No murmur heard.    No friction rub. No gallop.   Pulmonary:      Effort: Pulmonary effort is normal.      Breath sounds: Normal breath sounds. No decreased breath sounds, wheezing, rhonchi or rales.   Abdominal:      General: Bowel sounds are normal. There is no distension.      Palpations: Abdomen is soft. There is no hepatomegaly, splenomegaly or mass.      Tenderness: There is no abdominal tenderness.

## 2023-04-10 NOTE — PLAN OF CARE
[facility]  Initial Discharge Assessment       Primary Care Provider: Primary Doctor No    Admission Diagnosis: Chest pain [R07.9]    Admission Date: 4/8/2023  Expected Discharge Date: 4/10/2023    Discharge Barriers Identified: Does not adhere to care plan    Payor: MEDICARE / Plan: MEDICARE PART A & B / Product Type: Government /     Extended Emergency Contact Information  Primary Emergency Contact: kaylan freire  Mobile Phone: [phone]  Relation: Daughter   needed? No    Discharge Plan A: Home with family  Discharge Plan B: Home      [id number] - [place], [place] - [address] & [facility]  [address] [facility] [address]  Phone: [phone] Fax: [phone]      DC assessment completed with patient at bedside. Verified information on facesheet a correct. Patient lives at listed address with a close friend that he considers a daughter and her 4 children. Patient does not have PCP- agreeable with CM setting up apt. Denies POA. NOK 2 biological children. Denies hh/hd/blood thinners/outpt services. DME- cane. Independent at baseline. Reports that he was not taking home medications because he ran out of refills and did not have a MD to order more. Verified insurance on file. Unsure which insurance covers his prescriptions. Denies recent inpt stay in last 30 days. DC plan is home. High risk referrals placed.     Initial Assessment (most recent)       Adult Discharge Assessment - 04/10/23 1115          Discharge Assessment    Assessment Type Discharge Planning Assessment     Confirmed/corrected address, phone number and insurance Yes     Confirmed Demographics Correct on Facesheet     Source of Information patient     Does patient/caregiver understand observation status Yes     Communicated CLARKE with patient/caregiver Yes     Reason For Admission chest pain     People in Home friend(s)     Facility Arrived From: ER     Do you expect to return to your  current living situation? Yes     Do you have help at home or someone to help you manage your care at home? Yes     Prior to hospitilization cognitive status: Alert/Oriented     Current cognitive status: Alert/Oriented     Walking or Climbing Stairs ambulation difficulty, requires equipment     Equipment Currently Used at Home cane, straight     Readmission within 30 days? No     Patient currently being followed by outpatient case management? No     Do you currently have service(s) that help you manage your care at home? No     Do you take prescription medications? No     Do you have prescription coverage? Yes     Coverage pt is unsure     Do you have any problems affording any of your prescribed medications? TBD     Is the patient taking medications as prescribed? no     If no, which medications is patient not taking? all meds     Who is going to help you get home at discharge? medicaid transport or friend     How do you get to doctors appointments? health plan transportation;family or friend will provide     Are you on dialysis? No     Do you take coumadin? No     Discharge Plan A Home with family     Discharge Plan B Home     DME Needed Upon Discharge  none     Discharge Plan discussed with: Patient     Discharge Barriers Identified Does not adhere to care plan

## 2023-04-10 NOTE — PLAN OF CARE
[id number] - [facility] [address]  AT [facility] & [facility]  [address] [facility] [address]  Phone: [phone] Fax: [phone]       All new meds prescribed at [facility] are in stock and under $10 total

## 2023-04-10 NOTE — PLAN OF CARE
CM attempted to complete discharge planning assessment however the pt was out of the room. CM will return to follow up.    04/10/23 1052   Discharge Assessment   Assessment Type Discharge Planning Assessment

## 2023-04-10 NOTE — PLAN OF CARE
Patient not discharging today due to stress test needing be over 2 days. First part completed today and 2nd will be tomorrow

## 2023-04-10 NOTE — PROGRESS NOTES
[facility] Medicine  Progress Note    Patient Name: Stuart Adler  MRN: [id number]  Patient Class: OP- Observation   Admission Date: 4/8/2023  Length of Stay: 0 days  Attending Physician: Cassius Duarte MD  Primary Care Provider: Primary Doctor No        Subjective:     Principal Problem:Chest pain        HPI:  No notes on file    Overview/Hospital Course:  No notes on file    Interval History:  No new events noted overnight.  Patient denies any further chest pain.  Patient is scheduled for Lexiscan today.      Review of Systems   Constitutional:  Negative for chills and fever.   HENT:  Negative for congestion and sore throat.    Eyes:  Negative for visual disturbance.   Respiratory:  Negative for cough and shortness of breath.    Cardiovascular:  Positive for chest pain. Negative for palpitations.   Gastrointestinal:  Negative for abdominal pain, constipation, diarrhea, nausea and vomiting.   Endocrine: Negative for cold intolerance and heat intolerance.   Genitourinary:  Negative for dysuria and hematuria.   Musculoskeletal:  Negative for arthralgias and myalgias.   Skin:  Negative for rash.   Neurological:  Negative for tremors and seizures.   Hematological:  Negative for adenopathy. Does not bruise/bleed easily.   Objective:     Vital Signs (Most Recent):  Temp: 98.4 °F (36.9 °C) (04/10/23 1159)  Pulse: 68 (04/10/23 1159)  Resp: 18 (04/10/23 1159)  BP: (!) 169/103 (BP high nurse notified) (04/10/23 1159)  SpO2: (!) 93 % (04/10/23 1159)   Vital Signs (24h Range):  Temp:  [97.6 °F (36.4 °C)-98.4 °F (36.9 °C)] 98.4 °F (36.9 °C)  Pulse:  [53-68] 68  Resp:  [16-18] 18  SpO2:  [60 %-97 %] 93 %  BP: (114-169)/() 169/103     Weight: 130.8 kg (288 lb 6.4 oz)  Body mass index is 37.03 kg/m².    Intake/Output Summary (Last 24 hours) at 4/10/2023 1447  Last data filed at 4/10/2023 0343  Gross per 24 hour   Intake 240 ml   Output --   Net 240 ml      Physical Exam  Vitals and nursing note  reviewed.   Constitutional:       Appearance: Normal appearance. He is well-developed.   HENT:      Head: Normocephalic and atraumatic.      Nose: Nose normal. No septal deviation.   Eyes:      Conjunctiva/sclera: Conjunctivae normal.      Pupils: Pupils are equal, round, and reactive to light.   Neck:      Thyroid: No thyroid mass.      Vascular: No JVD.      Trachea: No tracheal tenderness or tracheal deviation.   Cardiovascular:      Rate and Rhythm: Normal rate and regular rhythm.      Heart sounds: S1 normal and S2 normal. No murmur heard.    No friction rub. No gallop.   Pulmonary:      Effort: Pulmonary effort is normal.      Breath sounds: Normal breath sounds. No decreased breath sounds, wheezing, rhonchi or rales.   Abdominal:      General: Bowel sounds are normal. There is no distension.      Palpations: Abdomen is soft. There is no hepatomegaly, splenomegaly or mass.      Tenderness: There is no abdominal tenderness.   Skin:     General: Skin is warm.      Findings: No rash.   Neurological:      General: No focal deficit present.      Mental Status: He is alert and oriented to person, place, and time.      Cranial Nerves: No cranial nerve deficit.      Sensory: No sensory deficit.   Psychiatric:         Mood and Affect: Mood normal.         Behavior: Behavior normal.       Significant Labs: All pertinent labs within the past 24 hours have been reviewed.  CBC:   Recent Labs   Lab 04/08/23 2028 04/09/23 0154 04/10/23  0338   WBC 7.90 7.23 8.41   HGB 14.6 15.8 15.2   HCT 44.5 47.6 45.1    154 187       CMP:   Recent Labs   Lab 04/08/23 2028 04/09/23 0154 04/10/23  0338    141 138   K 4.0 4.5 4.4    106 108   CO2 26 26 25   * 99 105   BUN 14 14 15   CREATININE 0.9 0.9 0.8   CALCIUM 9.1 9.1 9.1   PROT 6.7  --   --    ALBUMIN 3.5  --   --    BILITOT 0.4  --   --    ALKPHOS 73  --   --    AST 17  --   --    ALT 16  --   --    ANIONGAP 5* 9 5*       Coagulation:   Recent Labs   Lab  04/08/23 2028   INR 0.9       Lipid Panel:   Recent Labs   Lab 04/09/23  0154   CHOL 233*   HDL 43   LDLCALC 169.0*   TRIG 105   CHOLHDL 18.5*     Troponin hs: 23.7 ->25.  Microbiology Results (last 7 days)       ** No results found for the last 168 hours. **          Significant Imaging:   ECHO: Pending.     Lexiscan: Pending.     CXR: No acute cardiopulmonary abnormality.          Assessment/Plan:      * Chest pain  Supplemental O2 via nasal canula; titrate O2 saturation to >92%.  Ruled out ACS.  Tele-monitoring.   Follow cardiology recommendations and Lexiscan which is a 2 days test..  LDL is 169.  Start Lipitor 40 mg p.o. q.day. Side effect profile and medication compliance reviewed with the patient.  Use Nitroglycerine PRN Chest pain.  Na restriction <2g/d.        Skin carcinoma for right hemiface  Patient encouraged to follow-up with dermatologist for recommended screening and further management of right facial carcinoma.      Uncontrolled hypertension  Resume amlodipine, metoprolol and lisinopril use.  Use intravenous hydralazine 10 mg IV Q 2 hours p.r.n. for systolic blood pressure above 160 mmHg.      CAD (coronary artery disease)  Patient with known CAD and monitor for S/Sx of angina/ACS. Continue to monitor on telemetry.        Non-compliance  Patient counseled regarding medication and dietry compliance.      Smoker  Smoking cessation counseling performed. Dangers of cigarette smoking were reviewed with patient in detail and patient was encouraged to quit. Nicotine replacement options were discussed for > 10 minutes.        D/W CM regarding DC planning.   VTE Risk Mitigation (From admission, onward)         Ordered     enoxaparin injection 40 mg  Daily         04/09/23 0028     IP VTE HIGH RISK PATIENT  Once         04/09/23 0028     Place sequential compression device  Until discontinued         04/09/23 0028                Discharge Planning   CLARKE: 4/10/2023     Code Status: Full Code   Is the patient  medically ready for discharge?:     Reason for patient still in hospital (select all that apply): Patient trending condition and Consult recommendations  Discharge Plan A: Home with family                  Cassius Duarte MD  Department of Hospital Medicine   [facility]

## 2023-04-10 NOTE — ASSESSMENT & PLAN NOTE
Supplemental O2 via nasal canula; titrate O2 saturation to >92%.  Ruled out ACS.  Tele-monitoring.   Follow cardiology recommendations and Lexiscan which is a 2 days test..  LDL is 169.  Start Lipitor 40 mg p.o. q.day. Side effect profile and medication compliance reviewed with the patient.  Use Nitroglycerine PRN Chest pain.  Na restriction <2g/d.

## 2023-04-10 NOTE — SUBJECTIVE & OBJECTIVE
Interval History:  No new events noted overnight.  Patient denies any further chest pain.  Patient is scheduled for Lexiscan today.      Review of Systems   Constitutional:  Negative for chills and fever.   HENT:  Negative for congestion and sore throat.    Eyes:  Negative for visual disturbance.   Respiratory:  Negative for cough and shortness of breath.    Cardiovascular:  Positive for chest pain. Negative for palpitations.   Gastrointestinal:  Negative for abdominal pain, constipation, diarrhea, nausea and vomiting.   Endocrine: Negative for cold intolerance and heat intolerance.   Genitourinary:  Negative for dysuria and hematuria.   Musculoskeletal:  Negative for arthralgias and myalgias.   Skin:  Negative for rash.   Neurological:  Negative for tremors and seizures.   Hematological:  Negative for adenopathy. Does not bruise/bleed easily.   Objective:     Vital Signs (Most Recent):  Temp: 98.4 °F (36.9 °C) (04/10/23 1159)  Pulse: 68 (04/10/23 1159)  Resp: 18 (04/10/23 1159)  BP: (!) 169/103 (BP high nurse notified) (04/10/23 1159)  SpO2: (!) 93 % (04/10/23 1159)   Vital Signs (24h Range):  Temp:  [97.6 °F (36.4 °C)-98.4 °F (36.9 °C)] 98.4 °F (36.9 °C)  Pulse:  [53-68] 68  Resp:  [16-18] 18  SpO2:  [60 %-97 %] 93 %  BP: (114-169)/() 169/103     Weight: 130.8 kg (288 lb 6.4 oz)  Body mass index is 37.03 kg/m².    Intake/Output Summary (Last 24 hours) at 4/10/2023 1447  Last data filed at 4/10/2023 0343  Gross per 24 hour   Intake 240 ml   Output --   Net 240 ml      Physical Exam  Vitals and nursing note reviewed.   Constitutional:       Appearance: Normal appearance. He is well-developed.   HENT:      Head: Normocephalic and atraumatic.      Nose: Nose normal. No septal deviation.   Eyes:      Conjunctiva/sclera: Conjunctivae normal.      Pupils: Pupils are equal, round, and reactive to light.   Neck:      Thyroid: No thyroid mass.      Vascular: No JVD.      Trachea: No tracheal tenderness or tracheal  deviation.   Cardiovascular:      Rate and Rhythm: Normal rate and regular rhythm.      Heart sounds: S1 normal and S2 normal. No murmur heard.    No friction rub. No gallop.   Pulmonary:      Effort: Pulmonary effort is normal.      Breath sounds: Normal breath sounds. No decreased breath sounds, wheezing, rhonchi or rales.   Abdominal:      General: Bowel sounds are normal. There is no distension.      Palpations: Abdomen is soft. There is no hepatomegaly, splenomegaly or mass.      Tenderness: There is no abdominal tenderness.   Skin:     General: Skin is warm.      Findings: No rash.   Neurological:      General: No focal deficit present.      Mental Status: He is alert and oriented to person, place, and time.      Cranial Nerves: No cranial nerve deficit.      Sensory: No sensory deficit.   Psychiatric:         Mood and Affect: Mood normal.         Behavior: Behavior normal.       Significant Labs: All pertinent labs within the past 24 hours have been reviewed.  CBC:   Recent Labs   Lab 04/08/23  2028 04/09/23  0154 04/10/23  0338   WBC 7.90 7.23 8.41   HGB 14.6 15.8 15.2   HCT 44.5 47.6 45.1    154 187       CMP:   Recent Labs   Lab 04/08/23  2028 04/09/23  0154 04/10/23  0338    141 138   K 4.0 4.5 4.4    106 108   CO2 26 26 25   * 99 105   BUN 14 14 15   CREATININE 0.9 0.9 0.8   CALCIUM 9.1 9.1 9.1   PROT 6.7  --   --    ALBUMIN 3.5  --   --    BILITOT 0.4  --   --    ALKPHOS 73  --   --    AST 17  --   --    ALT 16  --   --    ANIONGAP 5* 9 5*       Coagulation:   Recent Labs   Lab 04/08/23 2028   INR 0.9       Lipid Panel:   Recent Labs   Lab 04/09/23 0154   CHOL 233*   HDL 43   LDLCALC 169.0*   TRIG 105   CHOLHDL 18.5*     Troponin hs: 23.7 ->25.  Microbiology Results (last 7 days)       ** No results found for the last 168 hours. **          Significant Imaging:   ECHO: Pending.     Lexiscan: Pending.     CXR: No acute cardiopulmonary abnormality.

## 2023-04-11 ENCOUNTER — HOSPITAL ENCOUNTER (OUTPATIENT)
Dept: RADIOLOGY | Facility: HOSPITAL | Age: 63
Discharge: HOME | End: 2023-04-11
Attending: FAMILY MEDICINE
Payer: MEDICARE

## 2023-04-11 VITALS
RESPIRATION RATE: 19 BRPM | HEART RATE: 69 BPM | DIASTOLIC BLOOD PRESSURE: 72 MMHG | HEIGHT: 74 IN | WEIGHT: 288.38 LBS | OXYGEN SATURATION: 98 % | TEMPERATURE: 98 F | SYSTOLIC BLOOD PRESSURE: 125 MMHG | BODY MASS INDEX: 37.01 KG/M2

## 2023-04-11 LAB
ANION GAP SERPL CALC-SCNC: 8 MMOL/L (ref 8–16)
BASOPHILS # BLD AUTO: 0.04 K/UL (ref 0–0.2)
BASOPHILS NFR BLD: 0.5 % (ref 0–1.9)
BUN SERPL-MCNC: 23 MG/DL (ref 8–23)
CALCIUM SERPL-MCNC: 8.7 MG/DL (ref 8.7–10.5)
CHLORIDE SERPL-SCNC: 106 MMOL/L (ref 95–110)
CO2 SERPL-SCNC: 23 MMOL/L (ref 23–29)
CREAT SERPL-MCNC: 0.9 MG/DL (ref 0.5–1.4)
DIFFERENTIAL METHOD: ABNORMAL
EOSINOPHIL # BLD AUTO: 0.2 K/UL (ref 0–0.5)
EOSINOPHIL NFR BLD: 2.2 % (ref 0–8)
ERYTHROCYTE [DISTWIDTH] IN BLOOD BY AUTOMATED COUNT: 12.3 % (ref 11.5–14.5)
EST. GFR  (NO RACE VARIABLE): >60 ML/MIN/1.73 M^2
GLUCOSE SERPL-MCNC: 105 MG/DL (ref 70–110)
HCT VFR BLD AUTO: 46.1 % (ref 40–54)
HGB BLD-MCNC: 15.2 G/DL (ref 14–18)
IMM GRANULOCYTES # BLD AUTO: 0.02 K/UL (ref 0–0.04)
IMM GRANULOCYTES NFR BLD AUTO: 0.3 % (ref 0–0.5)
LYMPHOCYTES # BLD AUTO: 1.6 K/UL (ref 1–4.8)
LYMPHOCYTES NFR BLD: 22.3 % (ref 18–48)
MAGNESIUM SERPL-MCNC: 2.1 MG/DL (ref 1.6–2.6)
MCH RBC QN AUTO: 32.3 PG (ref 27–31)
MCHC RBC AUTO-ENTMCNC: 33 G/DL (ref 32–36)
MCV RBC AUTO: 98 FL (ref 82–98)
MONOCYTES # BLD AUTO: 0.7 K/UL (ref 0.3–1)
MONOCYTES NFR BLD: 10.1 % (ref 4–15)
NEUTROPHILS # BLD AUTO: 4.8 K/UL (ref 1.8–7.7)
NEUTROPHILS NFR BLD: 64.6 % (ref 38–73)
NRBC BLD-RTO: 0 /100 WBC
PLATELET # BLD AUTO: 203 K/UL (ref 150–450)
PMV BLD AUTO: 10.6 FL (ref 9.2–12.9)
POTASSIUM SERPL-SCNC: 4.1 MMOL/L (ref 3.5–5.1)
RBC # BLD AUTO: 4.71 M/UL (ref 4.6–6.2)
SODIUM SERPL-SCNC: 137 MMOL/L (ref 136–145)
WBC # BLD AUTO: 7.35 K/UL (ref 3.9–12.7)

## 2023-04-11 PROCEDURE — 25000003 PHARM REV CODE 250: Performed by: FAMILY MEDICINE

## 2023-04-11 PROCEDURE — 25000003 PHARM REV CODE 250: Performed by: INTERNAL MEDICINE

## 2023-04-11 PROCEDURE — 83735 ASSAY OF MAGNESIUM: CPT | Performed by: FAMILY MEDICINE

## 2023-04-11 PROCEDURE — 80048 BASIC METABOLIC PNL TOTAL CA: CPT | Performed by: FAMILY MEDICINE

## 2023-04-11 PROCEDURE — 85025 COMPLETE CBC W/AUTO DIFF WBC: CPT | Performed by: FAMILY MEDICINE

## 2023-04-11 PROCEDURE — 25000003 PHARM REV CODE 250: Performed by: NURSE PRACTITIONER

## 2023-04-11 PROCEDURE — G0378 HOSPITAL OBSERVATION PER HR: HCPCS

## 2023-04-11 PROCEDURE — 36415 COLL VENOUS BLD VENIPUNCTURE: CPT | Performed by: FAMILY MEDICINE

## 2023-04-11 RX ADMIN — ASPIRIN 81 MG 81 MG: 81 TABLET ORAL at 08:04

## 2023-04-11 RX ADMIN — CLOPIDOGREL BISULFATE 75 MG: 75 TABLET, FILM COATED ORAL at 08:04

## 2023-04-11 RX ADMIN — METOPROLOL TARTRATE 25 MG: 25 TABLET, FILM COATED ORAL at 08:04

## 2023-04-11 RX ADMIN — ISOSORBIDE MONONITRATE 30 MG: 30 TABLET, EXTENDED RELEASE ORAL at 08:04

## 2023-04-11 RX ADMIN — LISINOPRIL 5 MG: 5 TABLET ORAL at 08:04

## 2023-04-11 RX ADMIN — AMLODIPINE BESYLATE 5 MG: 5 TABLET ORAL at 08:04

## 2023-04-11 NOTE — PLAN OF CARE
CM called [place] [facility] [employer] transport  at [phone] and confirmed that pts [place] Medicaid take charge plan covers family planning and her does not have any transportation benefits available. MARRY Yun CM updated.    04/11/23 1037   Discharge Assessment   Assessment Type Discharge Planning Reassessment

## 2023-04-11 NOTE — PLAN OF CARE
04/10/23 2325   Patient Assessment/Suction   Level of Consciousness (AVPU) alert   Respiratory Effort Unlabored   Expansion/Accessory Muscles/Retractions expansion symmetric   All Lung Fields Breath Sounds clear   PRE-TX-O2   Device (Oxygen Therapy) room air   SpO2 96 %   Pulse Oximetry Type Intermittent   $ Pulse Oximetry - Multiple Charge Pulse Oximetry - Multiple

## 2023-04-11 NOTE — PLAN OF CARE
Cleared from case management once seen by hospital medicine.    04/11/23 1100   Final Note   Assessment Type Final Discharge Note   Anticipated Discharge Disposition Home   What phone number can be called within the next 1-3 days to see how you are doing after discharge? [phone]   Hospital Resources/Appts/Education Provided Appointments scheduled and added to AVS

## 2023-04-11 NOTE — DISCHARGE SUMMARY
[facility]  Discharge Summary      Patient Name: Stuart Adler  MRN: [id number]  JOSE ANGEL: [id number]  Patient Class: OP- Observation  Admission Date: 4/8/2023  Hospital Length of Stay: 0 days  Discharge Date and Time: 4/11/2023 11:21 AM  Attending Physician: No att. providers found   Discharging Provider: Kenrick Monique MD  Primary Care Provider: Primary Doctor Jalyn    Primary Care Team: Networked reference to record PCT     HPI:   No notes on file    * No surgery found *      Hospital Course:   Patient was monitored on telemetry medical floor, serial cardiac enzymes remained negative. Patient was evaluated by cardiologist and scheduled for Lexiscan Myoview.  It was a 2-day test. Patient was extensively counseled regarding importance of medication, dietary compliance and the need to regularly follow up with his providers including his dermatologist.  assisted with PCP appointment. Patient was instructed to keep BP log and was counseled to stop smoking cigarettes and consuming ETOH.  The Myoview came back with no reversible ischemia.      Physical Exam  Vitals and nursing note reviewed.   Constitutional:       Appearance: Normal appearance. He is well-developed.   Cardiovascular:      Rate and Rhythm: Normal rate and regular rhythm.      Heart sounds: S1 normal and S2 normal. No murmur heard.    No friction rub. No gallop.   Pulmonary:      Effort: Pulmonary effort is normal.      Breath sounds: Normal breath sounds. No decreased breath sounds, wheezing, rhonchi or rales.   Abdominal:      General: Bowel sounds are normal. There is no distension.      Palpations: Abdomen is soft. There is no hepatomegaly, splenomegaly or mass.      Tenderness: There is no abdominal tenderness.          Goals of Care Treatment Preferences:  Code Status: Full Code      Consults:     No new Assessment & Plan notes have been filed under this hospital service since the last note was  generated.  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    Specialties: Interventional Cardiology, Cardiovascular Disease, Cardiology  Why: at 2:20 for hospital follow up. will see NP for this visit  Contact information:  [address]  [facility]  [place] [address]  [phone]             Kathryn Craft NP Follow up on 4/17/2023.    Specialty: Family Medicine  Why: at 1:30 PM for hospital follow up/est care  Contact information:  [address] [place] [address]  [phone]                       Patient Instructions:      Ambulatory referral/consult to Outpatient Case Management   Referral Priority: Routine Referral Type: Consultation   Referral Reason: Specialty Services Required   Number of Visits Requested: 1     Ambulatory referral/consult to [facility]   Standing Status: Future   Referral Priority: Routine Referral Type: Consultation   Referral Reason: Specialty Services Required   Number of Visits Requested: 1     Diet Cardiac     Notify your health care provider if you experience any of the following:  temperature >100.4     Notify your health care provider if you experience any of the following:  persistent nausea and vomiting or diarrhea     Notify your health care provider if you experience any of the following:  severe uncontrolled pain     Notify your health care provider if  you experience any of the following:  redness, tenderness, or signs of infection (pain, swelling, redness, odor or green/yellow discharge around incision site)     Notify your health care provider if you experience any of the following:  difficulty breathing or increased cough     Notify your health care provider if you experience any of the following:  severe persistent headache     Notify your health care provider if you experience any of the following:  worsening rash     Notify your health care provider if you experience any of the following:  persistent dizziness, light-headedness, or visual disturbances     Notify your health care provider if you experience any of the following:  increased confusion or weakness     Activity as tolerated   Order Comments: Fall precautions       Significant Diagnostic Studies: None.  The stress testing came back unremarkable.    Pending Diagnostic Studies:     None         Medications:  Reconciled Home Medications:      Medication List      CHANGE how you take these medications    amLODIPine 5 MG tablet  Commonly known as: NORVASC  Take 1 tablet (5 mg total) by mouth once daily.  What changed: when to take this     clopidogreL 75 mg tablet  Commonly known as: PLAVIX  Take 1 tablet (75 mg total) by mouth once daily.  What changed: when to take this     lisinopriL 10 MG tablet  Take 1 tablet (10 mg total) by mouth once daily.  What changed:   · medication strength  · how much to take  · when to take this        CONTINUE taking these medications    aspirin 81 MG Chew  CHEW AND SWALLOW 1 TABLET(81 MG) BY MOUTH EVERY DAY     atorvastatin 80 MG tablet  Commonly known as: LIPITOR  Take 0.5 tablets (40 mg total) by mouth once daily.     metoprolol tartrate 25 MG tablet  Commonly known as: LOPRESSOR  Take 1 tablet (25 mg total) by mouth 2 (two) times daily.     nitroGLYCERIN 0.4 MG SL tablet  Commonly known as: NITROSTAT  Place 1 tablet (0.4 mg total) under the tongue every 5 (five)  minutes as needed for Chest pain.            Indwelling Lines/Drains at time of discharge:   Lines/Drains/Airways     None                 Time spent on the discharge of patient: 18 minutes         Kenrick Monique MD  Department of Hospital Medicine  [facility]

## 2023-04-11 NOTE — PLAN OF CARE
Pt clear for DC from case management standpoint. Discharging to home      Dr. Monique needs to round on pt prior to DC       04/11/23 1025   Final Note   Assessment Type Final Discharge Note   Anticipated Discharge Disposition Home   Hospital Resources/Appts/Education Provided Appointments scheduled and added to AVS

## 2023-04-17 ENCOUNTER — OFFICE VISIT (OUTPATIENT)
Dept: FAMILY MEDICINE | Facility: CLINIC | Age: 63
End: 2023-04-17
Payer: MEDICARE

## 2023-04-17 VITALS
HEIGHT: 74 IN | SYSTOLIC BLOOD PRESSURE: 110 MMHG | DIASTOLIC BLOOD PRESSURE: 70 MMHG | WEIGHT: 285.69 LBS | OXYGEN SATURATION: 95 % | TEMPERATURE: 98 F | HEART RATE: 60 BPM | BODY MASS INDEX: 36.67 KG/M2

## 2023-04-17 DIAGNOSIS — F17.200 SMOKER: Chronic | ICD-10-CM

## 2023-04-17 DIAGNOSIS — R07.89 OTHER CHEST PAIN: ICD-10-CM

## 2023-04-17 DIAGNOSIS — C44.99 SKIN CARCINOMA: ICD-10-CM

## 2023-04-17 DIAGNOSIS — I10 UNCONTROLLED HYPERTENSION: ICD-10-CM

## 2023-04-17 DIAGNOSIS — Z12.5 PROSTATE CANCER SCREENING: ICD-10-CM

## 2023-04-17 DIAGNOSIS — Z09 HOSPITAL DISCHARGE FOLLOW-UP: Primary | ICD-10-CM

## 2023-04-17 DIAGNOSIS — I25.10 CORONARY ARTERY DISEASE INVOLVING NATIVE CORONARY ARTERY OF NATIVE HEART WITHOUT ANGINA PECTORIS: Chronic | ICD-10-CM

## 2023-04-17 PROCEDURE — 99213 OFFICE O/P EST LOW 20 MIN: CPT | Mod: S$PBB,,, | Performed by: NURSE PRACTITIONER

## 2023-04-17 PROCEDURE — 99213 OFFICE O/P EST LOW 20 MIN: CPT | Mod: PBBFAC,PO | Performed by: NURSE PRACTITIONER

## 2023-04-17 PROCEDURE — 99213 PR OFFICE/OUTPT VISIT, EST, LEVL III, 20-29 MIN: ICD-10-PCS | Mod: S$PBB,,, | Performed by: NURSE PRACTITIONER

## 2023-04-17 PROCEDURE — 99999 PR PBB SHADOW E&M-EST. PATIENT-LVL III: CPT | Mod: PBBFAC,,, | Performed by: NURSE PRACTITIONER

## 2023-04-17 PROCEDURE — 99999 PR PBB SHADOW E&M-EST. PATIENT-LVL III: ICD-10-PCS | Mod: PBBFAC,,, | Performed by: NURSE PRACTITIONER

## 2023-04-17 RX ORDER — LISINOPRIL 10 MG/1
10 TABLET ORAL DAILY
Qty: 30 TABLET | Refills: 5 | Status: SHIPPED | OUTPATIENT
Start: 2023-04-17

## 2023-04-17 RX ORDER — AMLODIPINE BESYLATE 5 MG/1
5 TABLET ORAL DAILY
Qty: 30 TABLET | Refills: 5 | Status: SHIPPED | OUTPATIENT
Start: 2023-04-17 | End: 2023-09-07

## 2023-04-17 RX ORDER — NITROGLYCERIN 0.4 MG/1
0.4 TABLET SUBLINGUAL EVERY 5 MIN PRN
Qty: 30 TABLET | Refills: 5 | Status: SHIPPED | OUTPATIENT
Start: 2023-04-17

## 2023-04-17 RX ORDER — METOPROLOL TARTRATE 25 MG/1
25 TABLET, FILM COATED ORAL 2 TIMES DAILY
Qty: 60 TABLET | Refills: 5 | Status: SHIPPED | OUTPATIENT
Start: 2023-04-17 | End: 2023-11-07

## 2023-04-17 RX ORDER — CLOPIDOGREL BISULFATE 75 MG/1
75 TABLET ORAL DAILY
Qty: 30 TABLET | Refills: 5 | Status: SHIPPED | OUTPATIENT
Start: 2023-04-17 | End: 2023-09-07

## 2023-04-17 RX ORDER — ATORVASTATIN CALCIUM 80 MG/1
40 TABLET, FILM COATED ORAL DAILY
Qty: 15 TABLET | Refills: 5 | Status: SHIPPED | OUTPATIENT
Start: 2023-04-17 | End: 2024-01-12

## 2023-04-17 NOTE — PROGRESS NOTES
"Subjective:       Patient ID: Stuart Adler is a 62 y.o. male.    Chief Complaint: hospital follow up     HPI   61 y/o male patient with medical problems listed below presents for hospital follow up.     Admission Date: 4/8/2023 at [facility]  Hospital Length of Stay: 0 days  Discharge Date and Time: 4/11/2023 11:21 AM    HPI  Patient presented to ED on 4/8 for chest pain for one week, worsened he did have nitro has took this morning with relief stated required another dose of nitro proximally 45 minutes ago pain is better but it is not resolved completely patient has a history of coronary disease states his last heart attack was a couple years ago he has had stents in the past unfortunately patient has been lost to follow-up has been off of his medications for "too long" patient does continue to smoke he denies any cough denies any fever chills no antecedent illness no reported nausea or diaphoresis    Hospital Course:   Patient was monitored on telemetry medical floor, serial cardiac enzymes remained negative. Patient was evaluated by cardiologist and scheduled for Lexiscan Myoview. It was a 2-day test. Patient was extensively counseled regarding importance of medication, dietary compliance and the need to regularly follow up with his providers including his dermatologist.  assisted with PCP appointment. Patient was instructed to keep BP log and was counseled to stop smoking cigarettes and consuming ETOH.  The Myoview came back with no reversible ischemia.       Medication List       CHANGE how you take these medications    amLODIPine 5 MG tablet  Commonly known as: NORVASC  Take 1 tablet (5 mg total) by mouth once daily.  What changed: when to take this      clopidogreL 75 mg tablet  Commonly known as: PLAVIX  Take 1 tablet (75 mg total) by mouth once daily.  What changed: when to take this      lisinopriL 10 MG tablet  Take 1 tablet (10 mg total) by mouth once daily.  What changed:   medication " "strength  how much to take  when to take this          CONTINUE taking these medications    aspirin 81 MG Chew  CHEW AND SWALLOW 1 TABLET(81 MG) BY MOUTH EVERY DAY      atorvastatin 80 MG tablet  Commonly known as: LIPITOR  Take 0.5 tablets (40 mg total) by mouth once daily.      metoprolol tartrate 25 MG tablet  Commonly known as: LOPRESSOR  Take 1 tablet (25 mg total) by mouth 2 (two) times daily.      nitroGLYCERIN 0.4 MG SL tablet  Commonly known as: NITROSTAT  Place 1 tablet (0.4 mg total) under the tongue every 5 (five) minutes as needed for Chest pain.        Today he states symptoms are improving and did not need to take nitroglycerin since after hospital discharge. Patient states has been adherent with medication regimen. He states has not been seen by PCP or cardiologist for "long time." He states went to ED when had symptoms of chest pain. He states had heart stent x 3 in the past at [facility], last one was done a year ago at [facility]. Patient is current smoker with one pack per day since at the age of 24 y/o but cut down to half pack per day since after hospital discharge.     States had colonoscopy done 4 years ago at [facility]    Labs reviewed from 4/2023    Patient Active Problem List   Diagnosis    Tetrahydrocannabinol (THC) use disorder, mild, abuse    Smoker    Hypertensive urgency    Facial basal cell cancer    Alcohol use    Psychosocial stressors    Non-compliance    CAD (coronary artery disease)    NSTEMI (non-ST elevated myocardial infarction)    Chest pain    Uncontrolled hypertension    Skin carcinoma for right hemiface        Review of patient's allergies indicates:  No Known Allergies    Past Surgical History:   Procedure Laterality Date    ANGIOGRAM, CORONARY, WITH LEFT HEART CATHETERIZATION N/A 1/28/2022    Procedure: Angiogram, Coronary, with Left Heart Cath;  Surgeon: Nacho Orellana MD;  Location: [facility] CATH/EP LAB;  Service: Cardiology;  Laterality: N/A;    FACIAL COSMETIC SURGERY      PERCUTANEOUS " "CORONARY INTERVENTION (PCI) FOR CHRONIC TOTAL OCCLUSION OF CORONARY ARTERY            Current Outpatient Medications:     aspirin 81 MG Chew, CHEW AND SWALLOW 1 TABLET(81 MG) BY MOUTH EVERY DAY, Disp: 90 tablet, Rfl: 0    amLODIPine (NORVASC) 5 MG tablet, Take 1 tablet (5 mg total) by mouth once daily., Disp: 30 tablet, Rfl: 5    atorvastatin (LIPITOR) 80 MG tablet, Take 0.5 tablets (40 mg total) by mouth once daily., Disp: 15 tablet, Rfl: 5    clopidogreL (PLAVIX) 75 mg tablet, Take 1 tablet (75 mg total) by mouth once daily., Disp: 30 tablet, Rfl: 5    lisinopriL 10 MG tablet, Take 1 tablet (10 mg total) by mouth once daily., Disp: 30 tablet, Rfl: 5    metoprolol tartrate (LOPRESSOR) 25 MG tablet, Take 1 tablet (25 mg total) by mouth 2 (two) times daily., Disp: 60 tablet, Rfl: 5    nitroGLYCERIN (NITROSTAT) 0.4 MG SL tablet, Place 1 tablet (0.4 mg total) under the tongue every 5 (five) minutes as needed for Chest pain., Disp: 30 tablet, Rfl: 5    Lab Results   Component Value Date    WBC 7.35 04/11/2023    HGB 15.2 04/11/2023    HCT 46.1 04/11/2023     04/11/2023    CHOL 233 (H) 04/09/2023    TRIG 105 04/09/2023    HDL 43 04/09/2023    ALT 16 04/08/2023    AST 17 04/08/2023     04/11/2023    K 4.1 04/11/2023     04/11/2023    CREATININE 0.9 04/11/2023    BUN 23 04/11/2023    CO2 23 04/11/2023    TSH 1.480 04/09/2023    INR 0.9 04/08/2023    HGBA1C 5.5 04/09/2023     Review of Systems   Constitutional:  Negative for chills and fever.   Respiratory:  Negative for cough, chest tightness and shortness of breath.    Cardiovascular:  Negative for chest pain and palpitations.   Gastrointestinal:  Negative for abdominal pain.   Neurological:  Negative for dizziness and headaches.       Objective:   /70 (BP Location: Left arm, Patient Position: Sitting, BP Method: Large (Manual))   Pulse 60   Temp 97.7 °F (36.5 °C) (Oral)   Ht 6' 2" (1.88 m)   Wt 129.6 kg (285 lb 11.5 oz)   SpO2 95%   BMI " 36.68 kg/m²         Physical Exam  Vitals reviewed.   Constitutional:       General: He is not in acute distress.     Appearance: Normal appearance.   Cardiovascular:      Rate and Rhythm: Normal rate and regular rhythm.      Pulses: Normal pulses.      Heart sounds: Normal heart sounds.   Pulmonary:      Effort: Pulmonary effort is normal.      Breath sounds: Normal breath sounds.   Chest:      Chest wall: No deformity, swelling or edema.   Abdominal:      General: Abdomen is flat. Bowel sounds are normal.      Palpations: Abdomen is soft.   Musculoskeletal:      Cervical back: Normal range of motion.   Skin:     General: Skin is warm and dry.   Neurological:      General: No focal deficit present.      Mental Status: He is alert.   Psychiatric:         Mood and Affect: Mood normal.         Behavior: Behavior normal.       Assessment:       1. Hospital discharge follow-up    2. Other chest pain    3. Coronary artery disease involving native coronary artery of native heart without angina pectoris    4. Uncontrolled hypertension    5. Prostate cancer screening    6. Skin carcinoma for right hemiface    7. Smoker        Plan:       1. Other chest pain  - nitroGLYCERIN (NITROSTAT) 0.4 MG SL tablet; Place 1 tablet (0.4 mg total) under the tongue every 5 (five) minutes as needed for Chest pain.  Dispense: 30 tablet; Refill: 5  - continue to follow with cardiology as scheduled on 4/26    2. Coronary artery disease involving native coronary artery of native heart without angina pectoris  - clopidogreL (PLAVIX) 75 mg tablet; Take 1 tablet (75 mg total) by mouth once daily.  Dispense: 30 tablet; Refill: 5  - atorvastatin (LIPITOR) 80 MG tablet; Take 0.5 tablets (40 mg total) by mouth once daily.  Dispense: 15 tablet; Refill: 5    3. Uncontrolled hypertension  - controlled and continue with current regimen   - Hemoglobin A1C; Future  - CBC Auto Differential; Future  - Comprehensive Metabolic Panel; Future  - Lipid Panel;  Future  - TSH; Future  - metoprolol tartrate (LOPRESSOR) 25 MG tablet; Take 1 tablet (25 mg total) by mouth 2 (two) times daily.  Dispense: 60 tablet; Refill: 5  - lisinopriL 10 MG tablet; Take 1 tablet (10 mg total) by mouth once daily.  Dispense: 30 tablet; Refill: 5  - amLODIPine (NORVASC) 5 MG tablet; Take 1 tablet (5 mg total) by mouth once daily.  Dispense: 30 tablet; Refill: 5    4. Prostate cancer screening  - PSA, SCREENING; Future    5. Hospital discharge follow-up    6. Skin carcinoma for right hemiface    7. Smoker  - Counseled on smoking cessation at length  - Patient is not ready to quit and reports likes to quit on his own    Patient with be reevaluated in 3 months to establish care with MD or sooner gertrude Welsh NP

## 2023-05-23 ENCOUNTER — OFFICE VISIT (OUTPATIENT)
Dept: CARDIOLOGY | Facility: CLINIC | Age: 63
End: 2023-05-23
Payer: MEDICARE

## 2023-05-23 VITALS
BODY MASS INDEX: 35.94 KG/M2 | DIASTOLIC BLOOD PRESSURE: 82 MMHG | SYSTOLIC BLOOD PRESSURE: 128 MMHG | OXYGEN SATURATION: 92 % | HEART RATE: 65 BPM | RESPIRATION RATE: 16 BRPM | WEIGHT: 280 LBS | HEIGHT: 74 IN

## 2023-05-23 DIAGNOSIS — Z95.5 STATUS POST INSERTION OF DRUG ELUTING CORONARY ARTERY STENT: Primary | ICD-10-CM

## 2023-05-23 DIAGNOSIS — I25.10 CORONARY ARTERY DISEASE, UNSPECIFIED VESSEL OR LESION TYPE, UNSPECIFIED WHETHER ANGINA PRESENT, UNSPECIFIED WHETHER NATIVE OR TRANSPLANTED HEART: ICD-10-CM

## 2023-05-23 DIAGNOSIS — R07.9 CHEST PAIN, UNSPECIFIED TYPE: ICD-10-CM

## 2023-05-23 DIAGNOSIS — I10 UNCONTROLLED HYPERTENSION: ICD-10-CM

## 2023-05-23 DIAGNOSIS — C44.310 FACIAL BASAL CELL CANCER: Chronic | ICD-10-CM

## 2023-05-23 DIAGNOSIS — Z78.9 ALCOHOL USE: Chronic | ICD-10-CM

## 2023-05-23 DIAGNOSIS — F17.200 SMOKER: Chronic | ICD-10-CM

## 2023-05-23 PROCEDURE — 99214 OFFICE O/P EST MOD 30 MIN: CPT | Mod: S$PBB,,,

## 2023-05-23 PROCEDURE — 99214 PR OFFICE/OUTPT VISIT, EST, LEVL IV, 30-39 MIN: ICD-10-PCS | Mod: S$PBB,,,

## 2023-05-23 PROCEDURE — 99999 PR PBB SHADOW E&M-EST. PATIENT-LVL IV: ICD-10-PCS | Mod: PBBFAC,,,

## 2023-05-23 PROCEDURE — 99999 PR PBB SHADOW E&M-EST. PATIENT-LVL IV: CPT | Mod: PBBFAC,,,

## 2023-05-23 PROCEDURE — 99214 OFFICE O/P EST MOD 30 MIN: CPT | Mod: PBBFAC,PN

## 2023-05-23 NOTE — PROGRESS NOTES
Subjective:    Patient ID:  Stuart Adler is a 62 y.o. male patient here for evaluation Hospital Follow Up      History of Present Illness:    Patient is here for hospital follow up.  He denies CP since discharge.  Occasional shortness of breath with exertion but patient states that this is because of his sinuses as he cannot breath well from his nose.  Decreased energy.  Stress test during hospitalization negative for reversible ischemia.  Patient continues to smoke but has cut it in 1/2.  Patient had a cardiac cath in January of 2022 with stent to Mid Cx.  Patient denies worsening shortness of breath since last angiogram.  He never went to cardiac rehab. He admits to occasional drinking at this time.  He is trying to quit altogether. Hasn't used nitro over the past 3-4 weeks.  RRR today in office.        Cardiac Cath 1/2022  The pre-procedure left ventricular end diastolic pressure was 10.  Severe mid left circumflex stenosis successfully treated with 1 drug eluting stent.  The Mid Cx lesion was 80% stenosed with 5% stenosis post-intervention.  A STENT OMID BROOKE 2.25 X 15 stent was successfully placed at 14 DANIEL for 30 sec.  Non obstructive LAD disease  Patent RCA stents without significant instent restenosis      Discharge Summary    Hospital Course:   Patient was monitored on telemetry medical floor, serial cardiac enzymes remained negative. Patient was evaluated by cardiologist and scheduled for Lexiscan Myoview.  It was a 2-day test. Patient was extensively counseled regarding importance of medication, dietary compliance and the need to regularly follow up with his providers including his dermatologist.  assisted with PCP appointment. Patient was instructed to keep BP log and was counseled to stop smoking cigarettes and consuming ETOH.  The Myoview came back with no reversible ischemia.         H&P   Stuart Adler is a 62 y.o. White male   With PMH of CAD, HTN, smoking,  who presents with  chest pain.     Located substernal, no radiation  Occurring intermittently  Moderate severity  Onset 1 week ago  Progressively worsening  Much worse today  Initally alleviated with nitroglycerin  Then CP returned  Next dose nitro did not fully improve his symptoms  This prompted ER presentation     Pt given asa and nitro in the ER  No SOB  No diaphoresis  No n/v  No LE edema     Pt noncompliant with medication and folllow-up  Ran out of meds except nitroglycerin a long time ago       Review of patient's allergies indicates:  No Known Allergies    Past Medical History:   Diagnosis Date    CAD (coronary artery disease)     Ethanolism     Heart attack     Hypertension     Smoker      Past Surgical History:   Procedure Laterality Date    ANGIOGRAM, CORONARY, WITH LEFT HEART CATHETERIZATION N/A 1/28/2022    Procedure: Angiogram, Coronary, with Left Heart Cath;  Surgeon: Nacho Orellana MD;  Location: [facility] CATH/EP LAB;  Service: Cardiology;  Laterality: N/A;    FACIAL COSMETIC SURGERY      PERCUTANEOUS CORONARY INTERVENTION (PCI) FOR CHRONIC TOTAL OCCLUSION OF CORONARY ARTERY       Social History     Tobacco Use    Smoking status: Every Day     Packs/day: 1.00     Types: Cigarettes   Substance Use Topics    Alcohol use: Yes     Comment: 1/4 bottle whiskey every day    Drug use: Yes     Types: Marijuana        Review of Systems:    As noted in HPI in addition      REVIEW OF SYSTEMS  CARDIOVASCULAR: No recent chest pain, palpitations, arm, neck, or jaw pain  RESPIRATORY: No recent fever, cough chills, SOB or congestion  : No blood in the urine  GI: No Nausea, vomiting, constipation, diarrhea, blood, or reflux.  MUSCULOSKELETAL: No myalgias  NEURO: No lightheadedness or dizziness  EYES: No Double vision, blurry, vision or headache     +MARTINES           Objective        Vitals:    05/23/23 1100   BP: 128/82   Pulse: 65   Resp: 16       LIPIDS - LAST 2   Lab Results   Component Value Date    CHOL 233 (H) 04/09/2023    HDL 43  04/09/2023    LDLCALC 169.0 (H) 04/09/2023    TRIG 105 04/09/2023    CHOLHDL 18.5 (L) 04/09/2023       CBC - LAST 2  Lab Results   Component Value Date    WBC 7.35 04/11/2023    WBC 8.41 04/10/2023    RBC 4.71 04/11/2023    RBC 4.62 04/10/2023    HGB 15.2 04/11/2023    HGB 15.2 04/10/2023    HCT 46.1 04/11/2023    HCT 45.1 04/10/2023    MCV 98 04/11/2023    MCV 98 04/10/2023    MCH 32.3 (H) 04/11/2023    MCH 32.9 (H) 04/10/2023    MCHC 33.0 04/11/2023    MCHC 33.7 04/10/2023    RDW 12.3 04/11/2023    RDW 12.3 04/10/2023     04/11/2023     04/10/2023    MPV 10.6 04/11/2023    MPV 10.3 04/10/2023    GRAN 4.8 04/11/2023    GRAN 64.6 04/11/2023    LYMPH 1.6 04/11/2023    LYMPH 22.3 04/11/2023    MONO 0.7 04/11/2023    MONO 10.1 04/11/2023    BASO 0.04 04/11/2023    BASO 0.04 04/10/2023    NRBC 0 04/11/2023    NRBC 0 04/10/2023       CHEMISTRY & LIVER FUNCTION - LAST 2  Lab Results   Component Value Date     04/11/2023     04/10/2023    K 4.1 04/11/2023    K 4.4 04/10/2023     04/11/2023     04/10/2023    CO2 23 04/11/2023    CO2 25 04/10/2023    ANIONGAP 8 04/11/2023    ANIONGAP 5 (L) 04/10/2023    BUN 23 04/11/2023    BUN 15 04/10/2023    CREATININE 0.9 04/11/2023    CREATININE 0.8 04/10/2023     04/11/2023     04/10/2023    CALCIUM 8.7 04/11/2023    CALCIUM 9.1 04/10/2023    MG 2.1 04/11/2023    MG 2.0 04/10/2023    ALBUMIN 3.5 04/08/2023    ALBUMIN 3.5 01/29/2022    PROT 6.7 04/08/2023    PROT 6.7 01/29/2022    ALKPHOS 73 04/08/2023    ALKPHOS 60 01/29/2022    ALT 16 04/08/2023    ALT 18 01/29/2022    AST 17 04/08/2023    AST 20 01/29/2022    BILITOT 0.4 04/08/2023    BILITOT 0.9 01/29/2022        CARDIAC PROFILE - LAST 2  Lab Results   Component Value Date    BNP 21 04/08/2023    BNP 18 01/27/2022     01/26/2021    CPKMB 13.0 (H) 01/28/2022    CPKMB 12.5 (H) 01/28/2022    TROPONINI 0.856 (HH) 01/28/2022    TROPONINI 0.296 (H) 01/28/2022    TROPONINIHS 25.0  (H) 04/09/2023    TROPONINIHS 23.7 (H) 04/08/2023        COAGULATION - LAST 2  Lab Results   Component Value Date    LABPT 13.1 01/28/2022    LABPT 13.3 01/26/2021    INR 0.9 04/08/2023    INR 1.1 01/28/2022    APTT 28.6 01/29/2022    APTT 27.7 01/28/2022       ENDOCRINE & PSA - LAST 2  Lab Results   Component Value Date    HGBA1C 5.5 04/09/2023    TSH 1.480 04/09/2023        ECHOCARDIOGRAM RESULTS  Results for orders placed during the hospital encounter of 04/08/23    Echo    Interpretation Summary  · The left ventricle is normal in size with concentric remodeling and normal systolic function.  · The estimated ejection fraction is 60%.  · Normal left ventricular diastolic function.  · Normal right ventricular size with normal right ventricular systolic function.  · Normal central venous pressure (3 mmHg).      CURRENT/PREVIOUS VISIT EKG  Results for orders placed or performed during the hospital encounter of 04/08/23   EKG 12-lead    Collection Time: 04/08/23  8:22 PM    Narrative    Test Reason : R07.9,    Vent. Rate : 069 BPM     Atrial Rate : 069 BPM     P-R Int : 132 ms          QRS Dur : 084 ms      QT Int : 418 ms       P-R-T Axes : 064 037 050 degrees     QTc Int : 447 ms    Normal sinus rhythm  Normal ECG  When compared with ECG of 28-JAN-2022 12:47,  T wave inversion no longer evident in Inferior leads  Confirmed by Layo Kaiser MD ([id number]) on 4/12/2023 6:47:45 PM    Referred By: [email or web address]   SELF           Confirmed By:Layo Kaiser MD     No valid procedures specified.   Results for orders placed during the hospital encounter of 04/08/23    Nuclear Stress Test    Interpretation Summary    The ECG portion of the study is negative for ischemia.    The patient reported no chest pain during the stress test.    There were no arrhythmias during stress.    The nuclear portion of this study will be reported separately.    No valid procedures specified.    PHYSICAL EXAM  CONSTITUTIONAL: Well built, well nourished  in no apparent distress  NECK: no carotid bruit, no JVD  LUNGS: CTA  CHEST WALL: no tenderness  HEART: regular rate and rhythm, S1, S2 normal, no murmur, click, rub or gallop   ABDOMEN: soft, non-tender; bowel sounds normal  EXTREMITIES: Extremities normal, no edema, no calf tenderness noted  NEURO: AAO X 3    I HAVE REVIEWED :    The vital signs, nurses notes, and all the pertinent radiology and labs.        Current Outpatient Medications   Medication Instructions    amLODIPine (NORVASC) 5 mg, Oral, Daily    aspirin 81 MG Chew CHEW AND SWALLOW 1 TABLET(81 MG) BY MOUTH EVERY DAY    atorvastatin (LIPITOR) 40 mg, Oral, Daily    clopidogreL (PLAVIX) 75 mg, Oral, Daily    lisinopriL 10 mg, Oral, Daily    metoprolol tartrate (LOPRESSOR) 25 mg, Oral, 2 times daily    nitroGLYCERIN (NITROSTAT) 0.4 mg, Sublingual, Every 5 min PRN          Assessment & Plan     CAD (coronary artery disease)  Continue aspirin 81 mg daily, atorvastatin 40 mg daily, and Plavix 75 mg daily.  Continue amlodipine 5 mg daily, lisinopril 10 mg daily and metoprolol tartrate 25 mg b.i.d..  Blood pressure stable today in office, 128/82.  Recommend low-sodium heart healthy diet.    Chest pain  Recent nuclear stress test that was negative for reversible ischemia.  Patient to continue current medication regimen including aspirin, Plavix, statin therapy.  Recommend low-sodium heart healthy diet.  Patient denies chest pain since discharge from hospital.  Recommend compliance with medication.  Recommend weight loss.  Referral placed for cardiac rehab since patient did not go after stent was placed in January of 2022.    Facial basal cell cancer  Recommend follow-up with dermatology.    Uncontrolled hypertension  Blood pressure stable at this time.  Continue amlodipine 5 mg daily, lisinopril 10 mg daily, metoprolol tartrate 25 mg b.i.d..    Alcohol use  Recommend alcohol cessation.    Smoker  Recommend smoking cessation.          No follow-ups on file.

## 2023-05-23 NOTE — ASSESSMENT & PLAN NOTE
Continue aspirin 81 mg daily, atorvastatin 40 mg daily, and Plavix 75 mg daily.  Continue amlodipine 5 mg daily, lisinopril 10 mg daily and metoprolol tartrate 25 mg b.i.d..  Blood pressure stable today in office, 128/82.  Recommend low-sodium heart healthy diet.

## 2023-05-23 NOTE — ASSESSMENT & PLAN NOTE
Blood pressure stable at this time.  Continue amlodipine 5 mg daily, lisinopril 10 mg daily, metoprolol tartrate 25 mg b.i.d..

## 2023-05-23 NOTE — ASSESSMENT & PLAN NOTE
Recent nuclear stress test that was negative for reversible ischemia.  Patient to continue current medication regimen including aspirin, Plavix, statin therapy.  Recommend low-sodium heart healthy diet.  Patient denies chest pain since discharge from hospital.  Recommend compliance with medication.  Recommend weight loss.  Referral placed for cardiac rehab since patient did not go after stent was placed in January of 2022.

## 2023-07-07 ENCOUNTER — TELEPHONE (OUTPATIENT)
Dept: CARDIAC REHAB | Facility: HOSPITAL | Age: 63
End: 2023-07-07

## 2023-07-19 ENCOUNTER — TELEPHONE (OUTPATIENT)
Dept: CARDIAC REHAB | Facility: HOSPITAL | Age: 63
End: 2023-07-19

## 2023-07-28 ENCOUNTER — TELEPHONE (OUTPATIENT)
Dept: CARDIAC REHAB | Facility: HOSPITAL | Age: 63
End: 2023-07-28

## 2023-07-28 NOTE — TELEPHONE ENCOUNTER
CALLING TO SCHEDULE ORIENTATION.  PATIENT CALLED BACK, SAID HE HAS TO FIND TRANSPORTATION.  FILE MOVED TO ADVERSE, REFERRAL CLOSED.

## 2023-09-05 DIAGNOSIS — I25.10 CORONARY ARTERY DISEASE INVOLVING NATIVE CORONARY ARTERY OF NATIVE HEART WITHOUT ANGINA PECTORIS: Chronic | ICD-10-CM

## 2023-09-05 DIAGNOSIS — I10 UNCONTROLLED HYPERTENSION: ICD-10-CM

## 2023-09-07 RX ORDER — AMLODIPINE BESYLATE 5 MG/1
5 TABLET ORAL
Qty: 90 TABLET | Refills: 2 | Status: SHIPPED | OUTPATIENT
Start: 2023-09-07

## 2023-09-07 RX ORDER — CLOPIDOGREL BISULFATE 75 MG/1
75 TABLET ORAL
Qty: 90 TABLET | Refills: 2 | Status: SHIPPED | OUTPATIENT
Start: 2023-09-07

## 2023-11-04 DIAGNOSIS — I10 UNCONTROLLED HYPERTENSION: ICD-10-CM

## 2023-11-07 RX ORDER — METOPROLOL TARTRATE 25 MG/1
25 TABLET, FILM COATED ORAL 2 TIMES DAILY
Qty: 180 TABLET | Refills: 2 | Status: SHIPPED | OUTPATIENT
Start: 2023-11-07

## 2024-01-11 DIAGNOSIS — I25.10 CORONARY ARTERY DISEASE INVOLVING NATIVE CORONARY ARTERY OF NATIVE HEART WITHOUT ANGINA PECTORIS: Chronic | ICD-10-CM

## 2024-01-12 RX ORDER — ATORVASTATIN CALCIUM 80 MG/1
TABLET, FILM COATED ORAL
Qty: 45 TABLET | Refills: 2 | Status: SHIPPED | OUTPATIENT
Start: 2024-01-12

## 2024-07-28 DIAGNOSIS — I10 UNCONTROLLED HYPERTENSION: ICD-10-CM

## 2024-07-28 DIAGNOSIS — I25.10 CORONARY ARTERY DISEASE INVOLVING NATIVE CORONARY ARTERY OF NATIVE HEART WITHOUT ANGINA PECTORIS: Chronic | ICD-10-CM

## 2024-08-04 RX ORDER — AMLODIPINE BESYLATE 5 MG/1
5 TABLET ORAL
Qty: 30 TABLET | Refills: 0 | Status: SHIPPED | OUTPATIENT
Start: 2024-08-04

## 2024-08-04 RX ORDER — CLOPIDOGREL BISULFATE 75 MG/1
75 TABLET ORAL
Qty: 30 TABLET | Refills: 0 | Status: SHIPPED | OUTPATIENT
Start: 2024-08-04

## 2024-09-23 ENCOUNTER — HOSPITAL ENCOUNTER (EMERGENCY)
Facility: HOSPITAL | Age: 64
Discharge: HOME | End: 2024-09-23
Attending: EMERGENCY MEDICINE
Payer: MEDICARE

## 2024-09-23 VITALS
OXYGEN SATURATION: 95 % | HEIGHT: 74 IN | HEART RATE: 65 BPM | WEIGHT: 280 LBS | RESPIRATION RATE: 16 BRPM | TEMPERATURE: 98 F | DIASTOLIC BLOOD PRESSURE: 101 MMHG | BODY MASS INDEX: 35.94 KG/M2 | SYSTOLIC BLOOD PRESSURE: 164 MMHG

## 2024-09-23 DIAGNOSIS — Z76.0 ENCOUNTER FOR MEDICATION REFILL: Primary | ICD-10-CM

## 2024-09-23 DIAGNOSIS — I25.10 CORONARY ARTERY DISEASE INVOLVING NATIVE CORONARY ARTERY OF NATIVE HEART WITHOUT ANGINA PECTORIS: Chronic | ICD-10-CM

## 2024-09-23 DIAGNOSIS — I10 UNCONTROLLED HYPERTENSION: ICD-10-CM

## 2024-09-23 PROCEDURE — 99281 EMR DPT VST MAYX REQ PHY/QHP: CPT

## 2024-09-23 RX ORDER — METOPROLOL TARTRATE 25 MG/1
25 TABLET, FILM COATED ORAL 2 TIMES DAILY
Qty: 180 TABLET | Refills: 0 | Status: SHIPPED | OUTPATIENT
Start: 2024-09-23 | End: 2024-12-22

## 2024-09-23 RX ORDER — AMLODIPINE BESYLATE 5 MG/1
5 TABLET ORAL DAILY
Qty: 90 TABLET | Refills: 0 | Status: SHIPPED | OUTPATIENT
Start: 2024-09-23 | End: 2024-12-22

## 2024-09-23 RX ORDER — CLOPIDOGREL BISULFATE 75 MG/1
75 TABLET ORAL DAILY
Qty: 90 TABLET | Refills: 0 | Status: SHIPPED | OUTPATIENT
Start: 2024-09-23 | End: 2024-12-22

## 2024-09-23 RX ORDER — LISINOPRIL 10 MG/1
10 TABLET ORAL DAILY
Qty: 90 TABLET | Refills: 0 | Status: SHIPPED | OUTPATIENT
Start: 2024-09-23 | End: 2024-12-22

## 2024-09-23 RX ORDER — ATORVASTATIN CALCIUM 80 MG/1
80 TABLET, FILM COATED ORAL DAILY
Qty: 90 TABLET | Refills: 0 | Status: SHIPPED | OUTPATIENT
Start: 2024-09-23 | End: 2024-12-22

## 2024-09-24 NOTE — ED PROVIDER NOTES
Encounter Date: 9/23/2024       History     Chief Complaint   Patient presents with    Medication Refill     PT HAS LIST OF MEDS NEEDED     Plain has no complaints.  The patient is here basically needing his medicines refill.  He provided me a list of medicines he needs to have refill including Plavix amlodipine lisinopril metoprolol and atorvastatin        Review of patient's allergies indicates:  No Known Allergies  Past Medical History:   Diagnosis Date    CAD (coronary artery disease)     Ethanolism     Heart attack     Hypertension     Smoker      Past Surgical History:   Procedure Laterality Date    ANGIOGRAM, CORONARY, WITH LEFT HEART CATHETERIZATION N/A 1/28/2022    Procedure: Angiogram, Coronary, with Left Heart Cath;  Surgeon: Nacho Orellana MD;  Location: [facility] CATH/EP LAB;  Service: Cardiology;  Laterality: N/A;    FACIAL COSMETIC SURGERY      PERCUTANEOUS CORONARY INTERVENTION (PCI) FOR CHRONIC TOTAL OCCLUSION OF CORONARY ARTERY       Family History   Problem Relation Name Age of Onset    Heart disease Father       Social History     Tobacco Use    Smoking status: Every Day     Current packs/day: 1.00     Types: Cigarettes   Substance Use Topics    Alcohol use: Yes     Comment: 1/4 bottle whiskey every day    Drug use: Yes     Types: Marijuana     Review of Systems   Constitutional:  Negative for chills and fever.   HENT:  Negative for ear pain, rhinorrhea and sore throat.    Eyes:  Negative for pain and visual disturbance.   Respiratory:  Negative for cough and shortness of breath.    Cardiovascular:  Negative for chest pain and palpitations.   Gastrointestinal:  Negative for abdominal pain, constipation, diarrhea, nausea and vomiting.   Genitourinary:  Negative for dysuria, frequency, hematuria and urgency.   Musculoskeletal:  Negative for back pain, joint swelling and myalgias.   Skin:  Negative for rash.   Neurological:  Negative for dizziness, seizures, weakness and headaches.    Psychiatric/Behavioral:  Negative for dysphoric mood. The patient is not nervous/anxious.        Physical Exam     Initial Vitals [09/23/24 1956]   BP Pulse Resp Temp SpO2   (!) 164/101 65 16 98 °F (36.7 °C) 95 %      MAP       --         Physical Exam    Nursing note and vitals reviewed.  Constitutional: He appears well-developed and well-nourished.   HENT:   Head: Normocephalic and atraumatic.   Eyes: Conjunctivae, EOM and lids are normal. Pupils are equal, round, and reactive to light.   Neck: Trachea normal. Neck supple. No thyroid mass present.   Cardiovascular:  Normal rate, regular rhythm and normal heart sounds.           Pulmonary/Chest: Breath sounds normal. No respiratory distress.   Abdominal: Abdomen is soft. There is no abdominal tenderness.   Musculoskeletal:         General: Normal range of motion.      Cervical back: Neck supple.     Neurological: He is alert and oriented to person, place, and time. He has normal strength and normal reflexes. No cranial nerve deficit or sensory deficit.   Skin: Skin is warm and dry.   Patient is status post surgery to the right side of the face from cancer.   Psychiatric: He has a normal mood and affect. His speech is normal and behavior is normal. Judgment and thought content normal.         ED Course   Procedures  Labs Reviewed - No data to display       Imaging Results    None          Medications - No data to display  Medical Decision Making  Patient with no major concerns.  No complaints of chest pain headache weakness nausea vomiting confusion.  He is here for med refill only.Adolfo Randall MD  8:10 PM 09/23/2024                                            Clinical Impression:  Final diagnoses:  [Z76.0] Encounter for medication refill (Primary)                 Adolfo Randall MD  09/23/24 2010

## 2025-03-27 ENCOUNTER — ANESTHESIA (OUTPATIENT)
Dept: SURGERY | Facility: HOSPITAL | Age: 65
End: 2025-03-27
Payer: MEDICARE

## 2025-03-27 ENCOUNTER — HOSPITAL ENCOUNTER (INPATIENT)
Facility: HOSPITAL | Age: 65
LOS: 4 days | Discharge: HOME HEALTH | DRG: 661 | End: 2025-04-01
Attending: EMERGENCY MEDICINE
Payer: MEDICARE

## 2025-03-27 ENCOUNTER — ANESTHESIA EVENT (OUTPATIENT)
Dept: SURGERY | Facility: HOSPITAL | Age: 65
End: 2025-03-27
Payer: MEDICARE

## 2025-03-27 DIAGNOSIS — N20.1 LEFT URETERAL CALCULUS: Primary | ICD-10-CM

## 2025-03-27 DIAGNOSIS — I10 UNCONTROLLED HYPERTENSION: ICD-10-CM

## 2025-03-27 DIAGNOSIS — R07.9 CHEST PAIN: ICD-10-CM

## 2025-03-27 DIAGNOSIS — Z01.818 PREOP EXAMINATION: ICD-10-CM

## 2025-03-27 DIAGNOSIS — I21.4 NSTEMI (NON-ST ELEVATED MYOCARDIAL INFARCTION): ICD-10-CM

## 2025-03-27 LAB
ABSOLUTE EOSINOPHIL (SMH): 0.08 K/UL
ABSOLUTE MONOCYTE (SMH): 0.62 K/UL (ref 0.3–1)
ABSOLUTE NEUTROPHIL COUNT (SMH): 6.2 K/UL (ref 1.8–7.7)
ALBUMIN SERPL-MCNC: 3.7 G/DL (ref 3.5–5.2)
ALP SERPL-CCNC: 72 UNIT/L (ref 55–135)
ALT SERPL-CCNC: 13 UNIT/L (ref 10–44)
ANION GAP (SMH): 5 MMOL/L (ref 8–16)
AST SERPL-CCNC: 13 UNIT/L (ref 10–40)
BACTERIA #/AREA URNS AUTO: ABNORMAL /HPF
BASOPHILS # BLD AUTO: 0.04 K/UL
BASOPHILS NFR BLD AUTO: 0.5 %
BILIRUB SERPL-MCNC: 0.3 MG/DL (ref 0.1–1)
BILIRUB UR QL STRIP.AUTO: NEGATIVE
BUN SERPL-MCNC: 21 MG/DL (ref 8–23)
CALCIUM SERPL-MCNC: 9.3 MG/DL (ref 8.7–10.5)
CHLORIDE SERPL-SCNC: 105 MMOL/L (ref 95–110)
CLARITY UR: CLEAR
CO2 SERPL-SCNC: 30 MMOL/L (ref 23–29)
COLOR UR AUTO: YELLOW
CREAT SERPL-MCNC: 1 MG/DL (ref 0.5–1.4)
ERYTHROCYTE [DISTWIDTH] IN BLOOD BY AUTOMATED COUNT: 12.9 % (ref 11.5–14.5)
GFR SERPLBLD CREATININE-BSD FMLA CKD-EPI: >60 ML/MIN/1.73/M2
GLUCOSE SERPL-MCNC: 130 MG/DL (ref 70–110)
GLUCOSE UR QL STRIP: NEGATIVE
HCT VFR BLD AUTO: 45.2 % (ref 40–54)
HCV AB SERPL QL IA: NORMAL
HGB BLD-MCNC: 14.8 GM/DL (ref 14–18)
HGB UR QL STRIP: ABNORMAL
HIV 1+2 AB+HIV1 P24 AG SERPL QL IA: NORMAL
HYALINE CASTS UR QL AUTO: 3 /LPF (ref 0–1)
IMM GRANULOCYTES # BLD AUTO: 0.03 K/UL (ref 0–0.04)
IMM GRANULOCYTES NFR BLD AUTO: 0.4 % (ref 0–0.5)
KETONES UR QL STRIP: NEGATIVE
LEUKOCYTE ESTERASE UR QL STRIP: ABNORMAL
LYMPHOCYTES # BLD AUTO: 0.78 K/UL (ref 1–4.8)
MCH RBC QN AUTO: 32.8 PG (ref 27–31)
MCHC RBC AUTO-ENTMCNC: 32.7 G/DL (ref 32–36)
MCV RBC AUTO: 100 FL (ref 82–98)
MICROSCOPIC COMMENT: ABNORMAL
NITRITE UR QL STRIP: NEGATIVE
NUCLEATED RBC (/100WBC) (SMH): 0 /100 WBC
OHS QRS DURATION: 88 MS
OHS QTC CALCULATION: 469 MS
PH UR STRIP: 7 [PH]
PLATELET # BLD AUTO: 164 K/UL (ref 150–450)
PMV BLD AUTO: 10.8 FL (ref 9.2–12.9)
POTASSIUM SERPL-SCNC: 4 MMOL/L (ref 3.5–5.1)
PROT SERPL-MCNC: 6.6 GM/DL (ref 6–8.4)
PROT UR QL STRIP: NEGATIVE
RBC # BLD AUTO: 4.51 M/UL (ref 4.6–6.2)
RBC #/AREA URNS AUTO: 2 /HPF
RELATIVE EOSINOPHIL (SMH): 1 % (ref 0–8)
RELATIVE LYMPHOCYTE (SMH): 10 % (ref 18–48)
RELATIVE MONOCYTE (SMH): 8 % (ref 4–15)
RELATIVE NEUTROPHIL (SMH): 80.1 % (ref 38–73)
SODIUM SERPL-SCNC: 140 MMOL/L (ref 136–145)
SP GR UR STRIP: 1.01
UROBILINOGEN UR STRIP-ACNC: NEGATIVE EU/DL
WBC # BLD AUTO: 7.77 K/UL (ref 3.9–12.7)
WBC #/AREA URNS AUTO: 1 /HPF

## 2025-03-27 PROCEDURE — G0378 HOSPITAL OBSERVATION PER HR: HCPCS

## 2025-03-27 PROCEDURE — 87086 URINE CULTURE/COLONY COUNT: CPT | Performed by: INTERNAL MEDICINE

## 2025-03-27 PROCEDURE — 27000673 HC TUBING BLOOD Y: Performed by: NURSE ANESTHETIST, CERTIFIED REGISTERED

## 2025-03-27 PROCEDURE — 63600175 PHARM REV CODE 636 W HCPCS: Performed by: NURSE ANESTHETIST, CERTIFIED REGISTERED

## 2025-03-27 PROCEDURE — BT1F1ZZ FLUOROSCOPY OF LEFT KIDNEY, URETER AND BLADDER USING LOW OSMOLAR CONTRAST: ICD-10-PCS | Performed by: SPECIALIST

## 2025-03-27 PROCEDURE — 25000003 PHARM REV CODE 250: Performed by: EMERGENCY MEDICINE

## 2025-03-27 PROCEDURE — 93005 ELECTROCARDIOGRAM TRACING: CPT | Performed by: INTERNAL MEDICINE

## 2025-03-27 PROCEDURE — 81003 URINALYSIS AUTO W/O SCOPE: CPT | Performed by: EMERGENCY MEDICINE

## 2025-03-27 PROCEDURE — 80053 COMPREHEN METABOLIC PANEL: CPT | Performed by: EMERGENCY MEDICINE

## 2025-03-27 PROCEDURE — 63600175 PHARM REV CODE 636 W HCPCS: Performed by: ANESTHESIOLOGY

## 2025-03-27 PROCEDURE — 63600175 PHARM REV CODE 636 W HCPCS: Performed by: INTERNAL MEDICINE

## 2025-03-27 PROCEDURE — 27202107 HC XP QUATRO SENSOR: Performed by: NURSE ANESTHETIST, CERTIFIED REGISTERED

## 2025-03-27 PROCEDURE — 71000039 HC RECOVERY, EACH ADD'L HOUR: Performed by: SPECIALIST

## 2025-03-27 PROCEDURE — 27201107 HC STYLET, STANDARD: Performed by: NURSE ANESTHETIST, CERTIFIED REGISTERED

## 2025-03-27 PROCEDURE — C1769 GUIDE WIRE: HCPCS | Performed by: SPECIALIST

## 2025-03-27 PROCEDURE — 37000009 HC ANESTHESIA EA ADD 15 MINS: Performed by: SPECIALIST

## 2025-03-27 PROCEDURE — 99285 EMERGENCY DEPT VISIT HI MDM: CPT | Mod: 25

## 2025-03-27 PROCEDURE — 25000003 PHARM REV CODE 250: Performed by: INTERNAL MEDICINE

## 2025-03-27 PROCEDURE — 86803 HEPATITIS C AB TEST: CPT | Performed by: EMERGENCY MEDICINE

## 2025-03-27 PROCEDURE — 37000008 HC ANESTHESIA 1ST 15 MINUTES: Performed by: SPECIALIST

## 2025-03-27 PROCEDURE — 25000003 PHARM REV CODE 250: Performed by: SPECIALIST

## 2025-03-27 PROCEDURE — 36000707: Performed by: SPECIALIST

## 2025-03-27 PROCEDURE — 0T788DZ DILATION OF BILATERAL URETERS WITH INTRALUMINAL DEVICE, VIA NATURAL OR ARTIFICIAL OPENING ENDOSCOPIC: ICD-10-PCS | Performed by: SPECIALIST

## 2025-03-27 PROCEDURE — 71000033 HC RECOVERY, INTIAL HOUR: Performed by: SPECIALIST

## 2025-03-27 PROCEDURE — 85025 COMPLETE CBC W/AUTO DIFF WBC: CPT | Performed by: EMERGENCY MEDICINE

## 2025-03-27 PROCEDURE — 25000003 PHARM REV CODE 250: Performed by: NURSE ANESTHETIST, CERTIFIED REGISTERED

## 2025-03-27 PROCEDURE — 93010 ELECTROCARDIOGRAM REPORT: CPT | Mod: ,,, | Performed by: INTERNAL MEDICINE

## 2025-03-27 PROCEDURE — 96361 HYDRATE IV INFUSION ADD-ON: CPT

## 2025-03-27 PROCEDURE — 63600175 PHARM REV CODE 636 W HCPCS: Mod: TB,JZ | Performed by: EMERGENCY MEDICINE

## 2025-03-27 PROCEDURE — C2617 STENT, NON-COR, TEM W/O DEL: HCPCS | Performed by: SPECIALIST

## 2025-03-27 PROCEDURE — 96375 TX/PRO/DX INJ NEW DRUG ADDON: CPT

## 2025-03-27 PROCEDURE — 36000706: Performed by: SPECIALIST

## 2025-03-27 PROCEDURE — 96374 THER/PROPH/DIAG INJ IV PUSH: CPT

## 2025-03-27 PROCEDURE — 87389 HIV-1 AG W/HIV-1&-2 AB AG IA: CPT | Performed by: EMERGENCY MEDICINE

## 2025-03-27 DEVICE — STENT ASCERTA URET 4.8FX26CM: Type: IMPLANTABLE DEVICE | Site: URETER | Status: FUNCTIONAL

## 2025-03-27 RX ORDER — DEXAMETHASONE SODIUM PHOSPHATE 4 MG/ML
INJECTION, SOLUTION INTRA-ARTICULAR; INTRALESIONAL; INTRAMUSCULAR; INTRAVENOUS; SOFT TISSUE
Status: DISCONTINUED | OUTPATIENT
Start: 2025-03-27 | End: 2025-03-27

## 2025-03-27 RX ORDER — FAMOTIDINE 20 MG/1
20 TABLET, FILM COATED ORAL 2 TIMES DAILY
Status: DISCONTINUED | OUTPATIENT
Start: 2025-03-27 | End: 2025-04-01 | Stop reason: HOSPADM

## 2025-03-27 RX ORDER — SUCCINYLCHOLINE CHLORIDE 20 MG/ML
INJECTION INTRAMUSCULAR; INTRAVENOUS
Status: DISCONTINUED | OUTPATIENT
Start: 2025-03-27 | End: 2025-03-27

## 2025-03-27 RX ORDER — CEFTRIAXONE 1 G/1
1 INJECTION, POWDER, FOR SOLUTION INTRAMUSCULAR; INTRAVENOUS
Status: DISCONTINUED | OUTPATIENT
Start: 2025-03-27 | End: 2025-04-01 | Stop reason: HOSPADM

## 2025-03-27 RX ORDER — HYDROCODONE BITARTRATE AND ACETAMINOPHEN 5; 325 MG/1; MG/1
1 TABLET ORAL EVERY 6 HOURS PRN
Refills: 0 | Status: DISCONTINUED | OUTPATIENT
Start: 2025-03-27 | End: 2025-04-01

## 2025-03-27 RX ORDER — ONDANSETRON HYDROCHLORIDE 2 MG/ML
4 INJECTION, SOLUTION INTRAVENOUS
Status: COMPLETED | OUTPATIENT
Start: 2025-03-27 | End: 2025-03-27

## 2025-03-27 RX ORDER — ACETAMINOPHEN 10 MG/ML
INJECTION, SOLUTION INTRAVENOUS
Status: DISCONTINUED | OUTPATIENT
Start: 2025-03-27 | End: 2025-03-27

## 2025-03-27 RX ORDER — METOPROLOL TARTRATE 25 MG/1
25 TABLET, FILM COATED ORAL 2 TIMES DAILY
Status: DISCONTINUED | OUTPATIENT
Start: 2025-03-27 | End: 2025-04-01 | Stop reason: HOSPADM

## 2025-03-27 RX ORDER — ATORVASTATIN CALCIUM 40 MG/1
80 TABLET, FILM COATED ORAL DAILY
Status: DISCONTINUED | OUTPATIENT
Start: 2025-03-27 | End: 2025-04-01 | Stop reason: HOSPADM

## 2025-03-27 RX ORDER — TALC
6 POWDER (GRAM) TOPICAL NIGHTLY PRN
Status: DISCONTINUED | OUTPATIENT
Start: 2025-03-27 | End: 2025-04-01 | Stop reason: HOSPADM

## 2025-03-27 RX ORDER — ONDANSETRON HYDROCHLORIDE 2 MG/ML
4 INJECTION, SOLUTION INTRAVENOUS ONCE
Status: COMPLETED | OUTPATIENT
Start: 2025-03-27 | End: 2025-03-27

## 2025-03-27 RX ORDER — LISINOPRIL 5 MG/1
10 TABLET ORAL DAILY
Status: DISCONTINUED | OUTPATIENT
Start: 2025-03-27 | End: 2025-04-01 | Stop reason: HOSPADM

## 2025-03-27 RX ORDER — ACETAMINOPHEN 325 MG/1
650 TABLET ORAL EVERY 4 HOURS PRN
Status: DISCONTINUED | OUTPATIENT
Start: 2025-03-27 | End: 2025-04-01 | Stop reason: HOSPADM

## 2025-03-27 RX ORDER — LANOLIN ALCOHOL/MO/W.PET/CERES
800 CREAM (GRAM) TOPICAL
Status: DISCONTINUED | OUTPATIENT
Start: 2025-03-27 | End: 2025-04-01 | Stop reason: HOSPADM

## 2025-03-27 RX ORDER — SODIUM,POTASSIUM PHOSPHATES 280-250MG
2 POWDER IN PACKET (EA) ORAL
Status: DISCONTINUED | OUTPATIENT
Start: 2025-03-27 | End: 2025-04-01 | Stop reason: HOSPADM

## 2025-03-27 RX ORDER — ACETAMINOPHEN 325 MG/1
650 TABLET ORAL EVERY 8 HOURS PRN
Status: DISCONTINUED | OUTPATIENT
Start: 2025-03-27 | End: 2025-04-01 | Stop reason: HOSPADM

## 2025-03-27 RX ORDER — KETAMINE HCL IN 0.9 % NACL 50 MG/5 ML
SYRINGE (ML) INTRAVENOUS
Status: DISCONTINUED | OUTPATIENT
Start: 2025-03-27 | End: 2025-03-27

## 2025-03-27 RX ORDER — NAPROXEN SODIUM 220 MG/1
81 TABLET, FILM COATED ORAL DAILY
Status: DISCONTINUED | OUTPATIENT
Start: 2025-03-27 | End: 2025-04-01 | Stop reason: HOSPADM

## 2025-03-27 RX ORDER — PROPOFOL 10 MG/ML
VIAL (ML) INTRAVENOUS
Status: DISCONTINUED | OUTPATIENT
Start: 2025-03-27 | End: 2025-03-27

## 2025-03-27 RX ORDER — HYDROMORPHONE HYDROCHLORIDE 1 MG/ML
0.5 INJECTION, SOLUTION INTRAMUSCULAR; INTRAVENOUS; SUBCUTANEOUS EVERY 6 HOURS PRN
Refills: 0 | Status: DISCONTINUED | OUTPATIENT
Start: 2025-03-27 | End: 2025-04-01

## 2025-03-27 RX ORDER — MORPHINE SULFATE 4 MG/ML
4 INJECTION, SOLUTION INTRAMUSCULAR; INTRAVENOUS
Refills: 0 | Status: COMPLETED | OUTPATIENT
Start: 2025-03-27 | End: 2025-03-27

## 2025-03-27 RX ORDER — NALOXONE HCL 0.4 MG/ML
0.02 VIAL (ML) INJECTION
Status: DISCONTINUED | OUTPATIENT
Start: 2025-03-27 | End: 2025-04-01 | Stop reason: HOSPADM

## 2025-03-27 RX ORDER — NITROGLYCERIN 0.4 MG/1
0.4 TABLET SUBLINGUAL EVERY 5 MIN PRN
Status: DISCONTINUED | OUTPATIENT
Start: 2025-03-27 | End: 2025-04-01 | Stop reason: HOSPADM

## 2025-03-27 RX ORDER — FENTANYL CITRATE 50 UG/ML
INJECTION, SOLUTION INTRAMUSCULAR; INTRAVENOUS
Status: DISCONTINUED | OUTPATIENT
Start: 2025-03-27 | End: 2025-03-27

## 2025-03-27 RX ORDER — ALUMINUM HYDROXIDE, MAGNESIUM HYDROXIDE, AND SIMETHICONE 1200; 120; 1200 MG/30ML; MG/30ML; MG/30ML
30 SUSPENSION ORAL 4 TIMES DAILY PRN
Status: DISCONTINUED | OUTPATIENT
Start: 2025-03-27 | End: 2025-04-01 | Stop reason: HOSPADM

## 2025-03-27 RX ORDER — EPHEDRINE SULFATE 50 MG/ML
INJECTION, SOLUTION INTRAVENOUS
Status: DISCONTINUED | OUTPATIENT
Start: 2025-03-27 | End: 2025-03-27

## 2025-03-27 RX ORDER — ONDANSETRON HYDROCHLORIDE 2 MG/ML
INJECTION, SOLUTION INTRAVENOUS
Status: DISCONTINUED | OUTPATIENT
Start: 2025-03-27 | End: 2025-03-27

## 2025-03-27 RX ORDER — ONDANSETRON HYDROCHLORIDE 2 MG/ML
4 INJECTION, SOLUTION INTRAVENOUS EVERY 6 HOURS PRN
Status: DISCONTINUED | OUTPATIENT
Start: 2025-03-27 | End: 2025-04-01 | Stop reason: HOSPADM

## 2025-03-27 RX ORDER — CLOPIDOGREL BISULFATE 75 MG/1
75 TABLET ORAL DAILY
Status: DISCONTINUED | OUTPATIENT
Start: 2025-03-27 | End: 2025-03-30

## 2025-03-27 RX ORDER — FAMOTIDINE 10 MG/ML
INJECTION, SOLUTION INTRAVENOUS
Status: DISCONTINUED | OUTPATIENT
Start: 2025-03-27 | End: 2025-03-27

## 2025-03-27 RX ORDER — LIDOCAINE HYDROCHLORIDE 20 MG/ML
INJECTION, SOLUTION EPIDURAL; INFILTRATION; INTRACAUDAL; PERINEURAL
Status: DISCONTINUED | OUTPATIENT
Start: 2025-03-27 | End: 2025-03-27

## 2025-03-27 RX ORDER — HYDRALAZINE HYDROCHLORIDE 20 MG/ML
10 INJECTION INTRAMUSCULAR; INTRAVENOUS EVERY 4 HOURS PRN
Status: DISCONTINUED | OUTPATIENT
Start: 2025-03-27 | End: 2025-04-01 | Stop reason: HOSPADM

## 2025-03-27 RX ORDER — AMLODIPINE BESYLATE 5 MG/1
5 TABLET ORAL DAILY
Status: DISCONTINUED | OUTPATIENT
Start: 2025-03-27 | End: 2025-03-28

## 2025-03-27 RX ORDER — KETOROLAC TROMETHAMINE 30 MG/ML
15 INJECTION, SOLUTION INTRAMUSCULAR; INTRAVENOUS
Status: COMPLETED | OUTPATIENT
Start: 2025-03-27 | End: 2025-03-27

## 2025-03-27 RX ORDER — LIDOCAINE HYDROCHLORIDE 20 MG/ML
JELLY TOPICAL
Status: DISCONTINUED | OUTPATIENT
Start: 2025-03-27 | End: 2025-03-27 | Stop reason: HOSPADM

## 2025-03-27 RX ORDER — ROCURONIUM BROMIDE 10 MG/ML
INJECTION, SOLUTION INTRAVENOUS
Status: DISCONTINUED | OUTPATIENT
Start: 2025-03-27 | End: 2025-03-27

## 2025-03-27 RX ADMIN — MORPHINE SULFATE 4 MG: 4 INJECTION INTRAVENOUS at 09:03

## 2025-03-27 RX ADMIN — CLOPIDOGREL BISULFATE 75 MG: 75 TABLET, FILM COATED ORAL at 08:03

## 2025-03-27 RX ADMIN — ROCURONIUM BROMIDE 25 MG: 10 INJECTION, SOLUTION INTRAVENOUS at 12:03

## 2025-03-27 RX ADMIN — Medication 10 MG: at 12:03

## 2025-03-27 RX ADMIN — Medication 120 MG: at 12:03

## 2025-03-27 RX ADMIN — LISINOPRIL 10 MG: 5 TABLET ORAL at 04:03

## 2025-03-27 RX ADMIN — HYDROCODONE BITARTRATE AND ACETAMINOPHEN 1 TABLET: 5; 325 TABLET ORAL at 04:03

## 2025-03-27 RX ADMIN — ROCURONIUM BROMIDE 5 MG: 10 INJECTION, SOLUTION INTRAVENOUS at 12:03

## 2025-03-27 RX ADMIN — FENTANYL CITRATE 100 MCG: 50 INJECTION INTRAMUSCULAR; INTRAVENOUS at 12:03

## 2025-03-27 RX ADMIN — ACETAMINOPHEN 1000 MG: 10 INJECTION, SOLUTION INTRAVENOUS at 12:03

## 2025-03-27 RX ADMIN — GLYCOPYRROLATE 0.2 MG: 0.2 INJECTION, SOLUTION INTRAMUSCULAR; INTRAVITREAL at 12:03

## 2025-03-27 RX ADMIN — Medication 2 G: at 12:03

## 2025-03-27 RX ADMIN — METOPROLOL TARTRATE 25 MG: 25 TABLET, FILM COATED ORAL at 08:03

## 2025-03-27 RX ADMIN — METOPROLOL TARTRATE 25 MG: 25 TABLET, FILM COATED ORAL at 11:03

## 2025-03-27 RX ADMIN — SUGAMMADEX 200 MG: 100 INJECTION, SOLUTION INTRAVENOUS at 12:03

## 2025-03-27 RX ADMIN — CEFTRIAXONE 1 G: 1 INJECTION, POWDER, FOR SOLUTION INTRAMUSCULAR; INTRAVENOUS at 04:03

## 2025-03-27 RX ADMIN — KETOROLAC TROMETHAMINE 15 MG: 30 INJECTION, SOLUTION INTRAMUSCULAR at 10:03

## 2025-03-27 RX ADMIN — FAMOTIDINE 20 MG: 10 INJECTION, SOLUTION INTRAVENOUS at 12:03

## 2025-03-27 RX ADMIN — ONDANSETRON 4 MG: 2 INJECTION INTRAMUSCULAR; INTRAVENOUS at 02:03

## 2025-03-27 RX ADMIN — ONDANSETRON 4 MG: 2 INJECTION INTRAMUSCULAR; INTRAVENOUS at 09:03

## 2025-03-27 RX ADMIN — SODIUM CHLORIDE, SODIUM LACTATE, POTASSIUM CHLORIDE, AND CALCIUM CHLORIDE: .6; .31; .03; .02 INJECTION, SOLUTION INTRAVENOUS at 12:03

## 2025-03-27 RX ADMIN — FAMOTIDINE 20 MG: 20 TABLET, FILM COATED ORAL at 08:03

## 2025-03-27 RX ADMIN — EPHEDRINE SULFATE 10 MG: 50 INJECTION INTRAVENOUS at 12:03

## 2025-03-27 RX ADMIN — ONDANSETRON 4 MG: 2 INJECTION INTRAMUSCULAR; INTRAVENOUS at 12:03

## 2025-03-27 RX ADMIN — AMLODIPINE BESYLATE 5 MG: 5 TABLET ORAL at 04:03

## 2025-03-27 RX ADMIN — DEXAMETHASONE SODIUM PHOSPHATE 8 MG: 4 INJECTION, SOLUTION INTRAMUSCULAR; INTRAVENOUS at 12:03

## 2025-03-27 RX ADMIN — ATORVASTATIN CALCIUM 80 MG: 40 TABLET, FILM COATED ORAL at 08:03

## 2025-03-27 RX ADMIN — ASPIRIN 81 MG: 81 TABLET, CHEWABLE ORAL at 04:03

## 2025-03-27 RX ADMIN — PROPOFOL 150 MG: 10 INJECTION, EMULSION INTRAVENOUS at 12:03

## 2025-03-27 RX ADMIN — SODIUM CHLORIDE 500 ML: 9 INJECTION, SOLUTION INTRAVENOUS at 09:03

## 2025-03-27 RX ADMIN — LIDOCAINE HYDROCHLORIDE 50 MG: 20 INJECTION, SOLUTION INTRAVENOUS at 12:03

## 2025-03-27 NOTE — TRANSFER OF CARE
"Anesthesia Transfer of Care Note    Patient: Stuart Adler    Procedure(s) Performed: Procedure(s) (LRB):  CYSTOSCOPY, WITH URETERAL STENT INSERTION (Right)    Patient location: PACU    Anesthesia Type: general    Transport from OR: Transported from OR on room air with adequate spontaneous ventilation    Post pain: adequate analgesia    Post assessment: no apparent anesthetic complications and tolerated procedure well    Post vital signs: stable    Level of consciousness: awake, alert and oriented    Nausea/Vomiting: no nausea/vomiting    Complications: none    Transfer of care protocol was followed      Last vitals: Visit Vitals  BP (!) 144/79   Pulse 68   Temp 36.8 °C (98.3 °F)   Resp 18   Ht 6' 2" (1.88 m)   Wt 122.5 kg (270 lb)   SpO2 95%   BMI 34.67 kg/m²     "

## 2025-03-27 NOTE — PROGRESS NOTES
Patient is status post cystoscopy with stent placement  Patient may be discharged home when ready    Please follow up with me next week with a KUB  We will plan for removal of his left ureteral stone

## 2025-03-27 NOTE — ASSESSMENT & PLAN NOTE
Patient's blood pressure range in the last 24 hours was: BP  Min: 100/61  Max: 191/92.The patient's inpatient anti-hypertensive regimen is listed below:  Current Antihypertensives  amLODIPine tablet 5 mg, Daily, Oral  lisinopriL tablet 10 mg, Daily, Oral  metoprolol tartrate (LOPRESSOR) tablet 25 mg, 2 times daily, Oral  nitroGLYCERIN SL tablet 0.4 mg, Every 5 min PRN, Sublingual  hydrALAZINE injection 10 mg, Every 4 hours PRN, Intravenous

## 2025-03-27 NOTE — ANESTHESIA POSTPROCEDURE EVALUATION
Anesthesia Post Evaluation    Patient: Stuart Adler    Procedure(s) Performed: Procedure(s) (LRB):  CYSTOSCOPY, WITH URETERAL STENT INSERTION (Right)    Final Anesthesia Type: general      Patient location during evaluation: PACU  Patient participation: Yes- Able to Participate  Level of consciousness: awake and alert  Post-procedure vital signs: reviewed and stable  Pain management: adequate  Airway patency: patent    PONV status at discharge: No PONV  Anesthetic complications: no      Cardiovascular status: stable  Respiratory status: unassisted and spontaneous ventilation  Hydration status: euvolemic  Follow-up not needed.              Vitals Value Taken Time   /90 03/27/25 14:00   Temp 36.7 °C (98 °F) 03/27/25 13:10   Pulse 59 03/27/25 14:04   Resp 18 03/27/25 14:04   SpO2 98 % 03/27/25 14:04   Vitals shown include unfiled device data.      No case tracking events are documented in the log.      Pain/Tristan Score: Pain Rating Prior to Med Admin: 6 (3/27/2025 10:17 AM)  Tristan Score: 9 (3/27/2025  1:30 PM)

## 2025-03-27 NOTE — HPI
64 year old pt getting admitted with Ureteral colic  Pt noticed that he was unable to urinate starting this AM   Also had pain on L abdominal/Flank area  Pain was cramp& constant with radiation of pain to back  Pt came to ER and got admitted   Imaging showed Oval distal left ureteral stone measuring 8 x 5 x 4 mm, with minimal upstream hydroureteronephrosis.

## 2025-03-27 NOTE — OP NOTE
Operative Note         SUMMARY     Surgery Date:  3/27/2025     Assistant:     Indications:  This is a 64-year-old male who comes in with severe flank pain  And nausea    A CT scan does demonstrate a left distal ureteral stone with hydronephrosis  Upon questioning the patient he does say that he is hurting on the right  Here for emergency cystoscopic intervention      Pre-op Diagnosis:   Flank pain with ureteral stone    Post-op Diagnosis:  Same    Procedure:  Cystoscopy with bilateral stent placement  Left retrograde pyelography  Anesthesia: General    Description of Procedure:  Patient brought to cystoscopy suite.  Placed in the cystoscopy position.  Patient is prepped and draped.  Anesthesia is given.  We enter the urinary bladder with the 21 fr cystoscope.   No lesions were found within the bladder  The orifices appeared to be normal  On fluoroscopy I did identify what appears to be a dense calcification at the right distal ureteral orifice  This was congruent with the patient's pain on the right flank  I did place a wire into the right ureter  And then did place a stent on the right side    Because the patient did have CT findings of left-sided distal ureter  I did place a cone-tipped catheter into the left ureter  And inject contrast  I did identify a filling defect in the left distal ureter  I did place a Glidewire in the left ureter as well  We then did use a double-J stent and placed it in the left kidney    With both stents in good position we did removed the telescope and sent him to recovery in good condition    Findings/Key Components:      Please note that our attempts to visualize the CT scan during the case was impossible  Because of computer malfunction    Estimated Blood Loss:     None

## 2025-03-27 NOTE — PHARMACY MED REC
Patient has been out of his medications for several months. Attributes are to cost and transportation

## 2025-03-27 NOTE — ED NOTES
Writer attempted to perform bladder scan.  Unable to perform with pt lying on side.  Pt states does not want to lie flat on back to perform test. States current position prevents pain.

## 2025-03-27 NOTE — ANESTHESIA PREPROCEDURE EVALUATION
03/27/2025  Stuart Adler is a 64 y.o., male.    Problem List[1]    Past Surgical History:   Procedure Laterality Date    ANGIOGRAM, CORONARY, WITH LEFT HEART CATHETERIZATION N/A 1/28/2022    Procedure: Angiogram, Coronary, with Left Heart Cath;  Surgeon: Nacho Orellana MD;  Location: [facility] CATH/EP LAB;  Service: Cardiology;  Laterality: N/A;    FACIAL COSMETIC SURGERY      PERCUTANEOUS CORONARY INTERVENTION (PCI) FOR CHRONIC TOTAL OCCLUSION OF CORONARY ARTERY          Tobacco Use:  The patient  reports that he has been smoking cigarettes. He does not have any smokeless tobacco history on file.     Results for orders placed or performed during the hospital encounter of 03/27/25   EKG 12-lead    Collection Time: 03/27/25 10:21 AM   Result Value Ref Range    QRS Duration 88 ms    [facility] QTC Calculation 469 ms    Narrative    Test Reason : Z01.818,    Vent. Rate :  68 BPM     Atrial Rate :  68 BPM     P-R Int : 140 ms          QRS Dur :  88 ms      QT Int : 442 ms       P-R-T Axes :  62  48  60 degrees    QTcB Int : 469 ms    Normal sinus rhythm  Normal ECG  When compared with ECG of 08-Apr-2023 20:22,  No significant change was found    Referred By: AAAREFERRAL SELF           Confirmed By:         Imaging Results               CT Renal Stone Study ABD Pelvis WO (Final result)  Result time 03/27/25 08:48:09      Final result by Roman Stephen MD (03/27/25 08:48:09)                   Impression:      1.  Oval distal left ureteral stone measuring 8 x 5 x 4 mm, with minimal upstream hydroureteronephrosis.    2.  Additional punctate nonobstructing stone in the inferior pole the left kidney.    This report was flagged in Epic as abnormal.      Electronically signed by: Roman Stephen  Date:    03/27/2025  Time:    08:48               Narrative:      CMS MANDATED QUALITY DATA - CT RADIATION - 436    All CT scans  at this facility utilize dose modulation, iterative reconstruction, and/or weight based dosing when appropriate to reduce radiation dose to as low as reasonably achievable.    CLINICAL HISTORY:  ([id number])65 y/o  (1960) M    Flank pain, kidney stone suspected;    TECHNIQUE:  (KINGS#[id number], exam time 3/27/2025 8:34)    CT RENAL STONE STUDY ABD PELVIS WO [id number]    Axial CT images of the abdomen and pelvis were obtained from the dome of the diaphragm to the proximal thighs.    COMPARISON:  None available.    FINDINGS:  Lower Thorax:    The lungs are essentially clear noting mild bilateral dependent atelectasis versus scarring. There is no pleural or pericardial effusion. There are scattered coronary arterial calcifications.    CT Abdomen:    Liver: The liver is normal in size.  There is a 10 mm rounded low-attenuation focus in the left lobe of the liver (image 48).    Gallbladder: The gallbladder is within normal limits.    Biliary Tree: No intra or extrahepatic ductal dilation.    Spleen: The spleen is normal in size.  Rounded calcifications are present throughout suggestive of prior granulomatous disease.    Pancreas: The pancreas is normal.    Adrenal Glands: Normal    Kidneys: 8 x 5 x 4 mm oval stone in the distal left ureter approximately 1 cm from the ureterovesicular junction, with very mild upstream hydroureteronephrosis.  There is an additional 1 mm nonobstructing inferior pole left renal stone.  No contour deforming renal mass or cyst is identified.    Vasculature: There are scattered atheromatous mural plaques in the aorta and iliacs with no aneurysm.    Lymph nodes: No abdominal lymphadenopathy is seen.    Intraperitoneal structures: There is no ascites.    Bowel: The bowel is normal in course and caliber without finding of obstruction, free air or abscess identified. The appendix is normal (image 149)    Abdominal wall: Unremarkable.    Musculoskeletal: No acute osseous abnormality is  identified.    CT Pelvis:    Bladder: Normal.    Reproductive Organs: Heterogeneous mildly enlarged prostate gland with dystrophic calcification throughout.    Pelvic Lymph nodes: No pelvic lymphadenopathy is identified.                                       Lab Results   Component Value Date    WBC 7.77 03/27/2025    HGB 14.8 03/27/2025    HCT 45.2 03/27/2025     (H) 03/27/2025     03/27/2025     BMP  Lab Results   Component Value Date     03/27/2025    K 4.0 03/27/2025     04/11/2023    CO2 30 (H) 03/27/2025    BUN 21 03/27/2025    CREATININE 1.0 03/27/2025    CALCIUM 9.3 03/27/2025    ANIONGAP 8 04/11/2023     04/11/2023     04/10/2023    GLU 99 04/09/2023       Results for orders placed during the hospital encounter of 04/08/23    Echo    Interpretation Summary  · The left ventricle is normal in size with concentric remodeling and normal systolic function.  · The estimated ejection fraction is 60%.  · Normal left ventricular diastolic function.  · Normal right ventricular size with normal right ventricular systolic function.  · Normal central venous pressure (3 mmHg).            Pre-op Assessment    I have reviewed the Patient Summary Reports.     I have reviewed the Nursing Notes. I have reviewed the NPO Status.   I have reviewed the Medications.     Review of Systems  Anesthesia Hx:  No problems with previous Anesthesia             Denies Family Hx of Anesthesia complications.    Denies Personal Hx of Anesthesia complications.                    Social:  Non-Smoker, Alcohol Use THC use.      Hematology/Oncology:  Hematology Normal                       -- :      Oncology: Radiation to right side of face..           Oncology Comments: Status post radiation to the right side of the face.  Reconstructive surgery.     EENT/Dental:  EENT/Dental Normal           Cardiovascular:     Hypertension  Past MI CAD   CABG/stent (Less than 5 years ago.  Currently does not have a  cardiologist.  He takes Plavix and aspirin.)           ECG has been reviewed.                            Pulmonary:  Pulmonary Normal                       Renal/:  Renal/ Normal                 Hepatic/GI:  Hepatic/GI Normal                    Musculoskeletal:  Musculoskeletal Normal                Neurological:  Neurology Normal                                      Endocrine:  Endocrine Normal            Psych:  Psychiatric History (Ethanolism)                  Physical Exam  General: Well nourished    Airway:  Mallampati: II   Mouth Opening: Normal  TM Distance: Normal  Tongue: Normal  Neck ROM: Normal ROM    Dental:  Intact    Chest/Lungs:  Clear to auscultation, Normal Respiratory Rate    Heart:  Rate: Normal  Rhythm: Regular Rhythm        Anesthesia Plan  Type of Anesthesia, risks & benefits discussed:    Anesthesia Type: Gen ETT  Intra-op Monitoring Plan: Standard ASA Monitors  Post Op Pain Control Plan: IV/PO Opioids PRN and multimodal analgesia  Induction:  IV  Airway Plan: Video  Informed Consent: Informed consent signed with the Patient and all parties understand the risks and agree with anesthesia plan.  All questions answered.   ASA Score: 3  Anesthesia Plan Notes: GETA  Multimodal analgesia with ofirmev 1000 mg, ketamine 25  and decadron 8 mg.  PONV prophylaxis with Pepcid 20 mg IV, and Zofran 4 mg IV.        Ready For Surgery From Anesthesia Perspective.     .           [1]   Patient Active Problem List  Diagnosis    Tetrahydrocannabinol (THC) use disorder, mild, abuse    Smoker    Hypertensive urgency    Facial basal cell cancer    Alcohol use    Psychosocial stressors    Non-compliance    CAD (coronary artery disease)    NSTEMI (non-ST elevated myocardial infarction)    Chest pain    Uncontrolled hypertension    Skin carcinoma for right hemiface    Left ureteral stone

## 2025-03-27 NOTE — PLAN OF CARE
Transported to room 1210 via stretcher in stable condition.  Ambulated to bathroom and back to bed without incident.  Security at bedside to return patient belongings.  Care relinquished to floor RN

## 2025-03-27 NOTE — CONSULTS
Consult for intense abdominal pain  CT imaging indicates a distal right ureteral stone 8 mm    We will evaluate for possible cystoscopic intervention to include urinary stenting  Please keep NPO

## 2025-03-27 NOTE — SUBJECTIVE & OBJECTIVE
Past Medical History:   Diagnosis Date    CAD (coronary artery disease)     Ethanolism     Heart attack     Hypertension     Smoker        Past Surgical History:   Procedure Laterality Date    ANGIOGRAM, CORONARY, WITH LEFT HEART CATHETERIZATION N/A 1/28/2022    Procedure: Angiogram, Coronary, with Left Heart Cath;  Surgeon: Nacho Orellana MD;  Location: [facility] CATH/EP LAB;  Service: Cardiology;  Laterality: N/A;    FACIAL COSMETIC SURGERY      PERCUTANEOUS CORONARY INTERVENTION (PCI) FOR CHRONIC TOTAL OCCLUSION OF CORONARY ARTERY         Review of patient's allergies indicates:  No Known Allergies    No current facility-administered medications on file prior to encounter.     Current Outpatient Medications on File Prior to Encounter   Medication Sig    nitroGLYCERIN (NITROSTAT) 0.4 MG SL tablet Place 1 tablet (0.4 mg total) under the tongue every 5 (five) minutes as needed for Chest pain.    amLODIPine (NORVASC) 5 MG tablet Take 1 tablet (5 mg total) by mouth once daily. (Patient not taking: Reported on 3/27/2025)    aspirin 81 MG Chew CHEW AND SWALLOW 1 TABLET(81 MG) BY MOUTH EVERY DAY (Patient not taking: Reported on 3/27/2025)    atorvastatin (LIPITOR) 80 MG tablet Take 1 tablet (80 mg total) by mouth once daily. (Patient not taking: Reported on 3/27/2025)    clopidogreL (PLAVIX) 75 mg tablet Take 1 tablet (75 mg total) by mouth once daily. for 90 doses (Patient not taking: Reported on 3/27/2025)    lisinopriL 10 MG tablet Take 1 tablet (10 mg total) by mouth once daily. (Patient not taking: Reported on 3/27/2025)    metoprolol tartrate (LOPRESSOR) 25 MG tablet Take 1 tablet (25 mg total) by mouth 2 (two) times daily. (Patient not taking: Reported on 3/27/2025)     Family History       Problem Relation (Age of Onset)    Heart disease Father          Tobacco Use    Smoking status: Every Day     Current packs/day: 1.00     Types: Cigarettes    Smokeless tobacco: Not on file   Substance and Sexual Activity    Alcohol  use: Yes     Comment: 1/4 bottle whiskey every day    Drug use: Yes     Types: Marijuana    Sexual activity: Not on file     Review of Systems   Constitutional:  Negative for activity change and appetite change.   HENT:  Negative for congestion and dental problem.    Eyes:  Negative for discharge and itching.   Respiratory:  Negative for shortness of breath.    Cardiovascular:  Negative for chest pain.   Gastrointestinal:  Positive for abdominal pain and nausea. Negative for abdominal distention.   Endocrine: Negative for cold intolerance.   Genitourinary:  Negative for difficulty urinating and dysuria.   Musculoskeletal:  Negative for arthralgias and back pain.   Skin:  Negative for color change.   Neurological:  Negative for dizziness and facial asymmetry.   Hematological:  Negative for adenopathy.   Psychiatric/Behavioral:  Negative for agitation and behavioral problems.      Objective:     Vital Signs (Most Recent):  Temp: 98 °F (36.7 °C) (03/27/25 1310)  Pulse: (!) 56 (03/27/25 1340)  Resp: (!) 30 (03/27/25 1340)  BP: 119/77 (03/27/25 1340)  SpO2: 98 % (03/27/25 1340) Vital Signs (24h Range):  Temp:  [98 °F (36.7 °C)-98.4 °F (36.9 °C)] 98 °F (36.7 °C)  Pulse:  [56-79] 56  Resp:  [18-30] 30  SpO2:  [85 %-98 %] 98 %  BP: (100-191)/(61-92) 119/77     Weight: 122.5 kg (270 lb)  Body mass index is 34.67 kg/m².     Physical Exam  Vitals and nursing note reviewed.   Constitutional:       Appearance: He is well-developed.   HENT:      Head: Atraumatic.      Right Ear: External ear normal.      Left Ear: External ear normal.      Nose: Nose normal.   Eyes:      Comments: One eye   Cardiovascular:      Rate and Rhythm: Normal rate.   Pulmonary:      Effort: Pulmonary effort is normal.   Abdominal:      Palpations: Abdomen is soft.   Musculoskeletal:         General: Normal range of motion.      Cervical back: Full passive range of motion without pain and normal range of motion.   Skin:     General: Skin is warm.    Neurological:      Mental Status: He is alert and oriented to person, place, and time.   Psychiatric:         Behavior: Behavior normal.                Significant Labs: All pertinent labs within the past 24 hours have been reviewed.  CBC:   Recent Labs   Lab 03/27/25  0732   WBC 7.77   HGB 14.8   HCT 45.2        CMP:   Recent Labs   Lab 03/27/25  0732      K 4.0   CO2 30*   BUN 21   CREATININE 1.0   CALCIUM 9.3   ALBUMIN 3.7   BILITOT 0.3   ALKPHOS 72   AST 13   ALT 13       Significant Imaging: I have reviewed all pertinent imaging results/findings within the past 24 hours.

## 2025-03-27 NOTE — ANESTHESIA PROCEDURE NOTES
Intubation    Date/Time: 3/27/2025 12:18 PM    Performed by: True Molina CRNA  Authorized by: True Molina CRNA    Intubation:     Induction:  Intravenous    Intubated:  Postinduction    Mask Ventilation:  Easy mask    Attempts:  1    Attempted By:  CRNA    Method of Intubation:  Video laryngoscopy    Blade:  [place]ough[place] 3    Laryngeal View Grade: Grade I - full view of cords      Difficult Airway Encountered?: No      Complications:  None    Airway Device:  Oral endotracheal tube    Airway Device Size:  7.5    Style/Cuff Inflation:  Cuffed    Inflation Amount (mL):  8    Tube secured:  22    Secured at:  The lips    Placement Verified By:  Capnometry    Complicating Factors:  None    Findings Post-Intubation:  BS equal bilateral and atraumatic/condition of teeth unchanged

## 2025-03-27 NOTE — H&P
[facility] - Emergency Dept  Hospital Medicine  History & Physical    Patient Name: Stuart Adler  MRN: [id number]  Patient Class: OP- Observation  Admission Date: 3/27/2025  Attending Physician: David Kim MD   Primary Care Provider: Jalyn Primary Doctor         Patient information was obtained from patient, past medical records, ER records, and ER MD  .     Subjective:     Principal Problem:Left ureteral stone    Chief Complaint:   Chief Complaint   Patient presents with    Urinary Retention     Pt reports he was last able to urinate around 7 PM        HPI: 64 year old pt getting admitted with Ureteral colic  Pt noticed that he was unable to urinate starting this AM   Also had pain on L abdominal/Flank area  Pain was cramp& constant with radiation of pain to back  Pt came to ER and got admitted   Imaging showed Oval distal left ureteral stone measuring 8 x 5 x 4 mm, with minimal upstream hydroureteronephrosis.     Past Medical History:   Diagnosis Date    CAD (coronary artery disease)     Ethanolism     Heart attack     Hypertension     Smoker        Past Surgical History:   Procedure Laterality Date    ANGIOGRAM, CORONARY, WITH LEFT HEART CATHETERIZATION N/A 1/28/2022    Procedure: Angiogram, Coronary, with Left Heart Cath;  Surgeon: Nacho Orellana MD;  Location: [facility] CATH/EP LAB;  Service: Cardiology;  Laterality: N/A;    FACIAL COSMETIC SURGERY      PERCUTANEOUS CORONARY INTERVENTION (PCI) FOR CHRONIC TOTAL OCCLUSION OF CORONARY ARTERY         Review of patient's allergies indicates:  No Known Allergies    No current facility-administered medications on file prior to encounter.     Current Outpatient Medications on File Prior to Encounter   Medication Sig    nitroGLYCERIN (NITROSTAT) 0.4 MG SL tablet Place 1 tablet (0.4 mg total) under the tongue every 5 (five) minutes as needed for Chest pain.    amLODIPine (NORVASC) 5 MG tablet Take 1 tablet (5 mg total) by mouth once daily. (Patient not  taking: Reported on 3/27/2025)    aspirin 81 MG Chew CHEW AND SWALLOW 1 TABLET(81 MG) BY MOUTH EVERY DAY (Patient not taking: Reported on 3/27/2025)    atorvastatin (LIPITOR) 80 MG tablet Take 1 tablet (80 mg total) by mouth once daily. (Patient not taking: Reported on 3/27/2025)    clopidogreL (PLAVIX) 75 mg tablet Take 1 tablet (75 mg total) by mouth once daily. for 90 doses (Patient not taking: Reported on 3/27/2025)    lisinopriL 10 MG tablet Take 1 tablet (10 mg total) by mouth once daily. (Patient not taking: Reported on 3/27/2025)    metoprolol tartrate (LOPRESSOR) 25 MG tablet Take 1 tablet (25 mg total) by mouth 2 (two) times daily. (Patient not taking: Reported on 3/27/2025)     Family History       Problem Relation (Age of Onset)    Heart disease Father          Tobacco Use    Smoking status: Every Day     Current packs/day: 1.00     Types: Cigarettes    Smokeless tobacco: Not on file   Substance and Sexual Activity    Alcohol use: Yes     Comment: 1/4 bottle whiskey every day    Drug use: Yes     Types: Marijuana    Sexual activity: Not on file     Review of Systems   Constitutional:  Negative for activity change and appetite change.   HENT:  Negative for congestion and dental problem.    Eyes:  Negative for discharge and itching.   Respiratory:  Negative for shortness of breath.    Cardiovascular:  Negative for chest pain.   Gastrointestinal:  Positive for abdominal pain and nausea. Negative for abdominal distention.   Endocrine: Negative for cold intolerance.   Genitourinary:  Negative for difficulty urinating and dysuria.   Musculoskeletal:  Negative for arthralgias and back pain.   Skin:  Negative for color change.   Neurological:  Negative for dizziness and facial asymmetry.   Hematological:  Negative for adenopathy.   Psychiatric/Behavioral:  Negative for agitation and behavioral problems.      Objective:     Vital Signs (Most Recent):  Temp: 98 °F (36.7 °C) (03/27/25 1310)  Pulse: (!) 56 (03/27/25  1340)  Resp: (!) 30 (03/27/25 1340)  BP: 119/77 (03/27/25 1340)  SpO2: 98 % (03/27/25 1340) Vital Signs (24h Range):  Temp:  [98 °F (36.7 °C)-98.4 °F (36.9 °C)] 98 °F (36.7 °C)  Pulse:  [56-79] 56  Resp:  [18-30] 30  SpO2:  [85 %-98 %] 98 %  BP: (100-191)/(61-92) 119/77     Weight: 122.5 kg (270 lb)  Body mass index is 34.67 kg/m².     Physical Exam  Vitals and nursing note reviewed.   Constitutional:       Appearance: He is well-developed.   HENT:      Head: Atraumatic.      Right Ear: External ear normal.      Left Ear: External ear normal.      Nose: Nose normal.   Eyes:      Comments: One eye   Cardiovascular:      Rate and Rhythm: Normal rate.   Pulmonary:      Effort: Pulmonary effort is normal.   Abdominal:      Palpations: Abdomen is soft.   Musculoskeletal:         General: Normal range of motion.      Cervical back: Full passive range of motion without pain and normal range of motion.   Skin:     General: Skin is warm.   Neurological:      Mental Status: He is alert and oriented to person, place, and time.   Psychiatric:         Behavior: Behavior normal.                Significant Labs: All pertinent labs within the past 24 hours have been reviewed.  CBC:   Recent Labs   Lab 03/27/25  0732   WBC 7.77   HGB 14.8   HCT 45.2        CMP:   Recent Labs   Lab 03/27/25  0732      K 4.0   CO2 30*   BUN 21   CREATININE 1.0   CALCIUM 9.3   ALBUMIN 3.7   BILITOT 0.3   ALKPHOS 72   AST 13   ALT 13       Significant Imaging: I have reviewed all pertinent imaging results/findings within the past 24 hours.    Assessment/Plan:     * Left ureteral stone  Urology consult  NPO at the moment  UC and will start on iv abx       Uncontrolled hypertension  Patient's blood pressure range in the last 24 hours was: BP  Min: 100/61  Max: 191/92.The patient's inpatient anti-hypertensive regimen is listed below:  Current Antihypertensives  amLODIPine tablet 5 mg, Daily, Oral  lisinopriL tablet 10 mg, Daily,  Oral  metoprolol tartrate (LOPRESSOR) tablet 25 mg, 2 times daily, Oral  nitroGLYCERIN SL tablet 0.4 mg, Every 5 min PRN, Sublingual  hydrALAZINE injection 10 mg, Every 4 hours PRN, Intravenous        Facial basal cell cancer  S/p surgery        VTE Risk Mitigation (From admission, onward)           Ordered     IP VTE HIGH RISK PATIENT  Once         03/27/25 1022     Place sequential compression device  Until discontinued         03/27/25 1022                       On 03/27/2025, patient should be placed in hospital observation services under my care.        Patient is status post cystoscopy with stent placement  Patient may be discharged home when ready    Please follow up with me next week with a KUB  We will plan for removal of his left ureteral stone    David Kim MD  Department of Hospital Medicine  [facility] - Emergency Dept

## 2025-03-28 LAB
ABSOLUTE EOSINOPHIL (SMH): 0.02 K/UL
ABSOLUTE MONOCYTE (SMH): 0.52 K/UL (ref 0.3–1)
ABSOLUTE NEUTROPHIL COUNT (SMH): 9.9 K/UL (ref 1.8–7.7)
ALBUMIN SERPL-MCNC: 3.5 G/DL (ref 3.5–5.2)
ALP SERPL-CCNC: 61 UNIT/L (ref 55–135)
ALT SERPL-CCNC: 12 UNIT/L (ref 10–44)
ANION GAP (SMH): 11 MMOL/L (ref 8–16)
AST SERPL-CCNC: 13 UNIT/L (ref 10–40)
BASOPHILS # BLD AUTO: 0.01 K/UL
BASOPHILS NFR BLD AUTO: 0.1 %
BILIRUB SERPL-MCNC: 0.4 MG/DL (ref 0.1–1)
BILIRUB UR QL STRIP.AUTO: NEGATIVE
BUN SERPL-MCNC: 19 MG/DL (ref 8–23)
CALCIUM SERPL-MCNC: 8.9 MG/DL (ref 8.7–10.5)
CHLORIDE SERPL-SCNC: 104 MMOL/L (ref 95–110)
CLARITY UR: ABNORMAL
CO2 SERPL-SCNC: 22 MMOL/L (ref 23–29)
COLOR UR AUTO: ABNORMAL
CREAT SERPL-MCNC: 0.8 MG/DL (ref 0.5–1.4)
ERYTHROCYTE [DISTWIDTH] IN BLOOD BY AUTOMATED COUNT: 12.9 % (ref 11.5–14.5)
GFR SERPLBLD CREATININE-BSD FMLA CKD-EPI: >60 ML/MIN/1.73/M2
GLUCOSE SERPL-MCNC: 122 MG/DL (ref 70–110)
GLUCOSE UR QL STRIP: NEGATIVE
HCT VFR BLD AUTO: 43.4 % (ref 40–54)
HGB BLD-MCNC: 14.4 GM/DL (ref 14–18)
HGB UR QL STRIP: ABNORMAL
IMM GRANULOCYTES # BLD AUTO: 0.05 K/UL (ref 0–0.04)
IMM GRANULOCYTES NFR BLD AUTO: 0.4 % (ref 0–0.5)
KETONES UR QL STRIP: NEGATIVE
LEUKOCYTE ESTERASE UR QL STRIP: ABNORMAL
LYMPHOCYTES # BLD AUTO: 0.76 K/UL (ref 1–4.8)
MAGNESIUM SERPL-MCNC: 1.7 MG/DL (ref 1.6–2.6)
MCH RBC QN AUTO: 32.5 PG (ref 27–31)
MCHC RBC AUTO-ENTMCNC: 33.2 G/DL (ref 32–36)
MCV RBC AUTO: 98 FL (ref 82–98)
MICROSCOPIC COMMENT: ABNORMAL
NITRITE UR QL STRIP: NEGATIVE
NUCLEATED RBC (/100WBC) (SMH): 0 /100 WBC
PH UR STRIP: 7 [PH]
PLATELET # BLD AUTO: 175 K/UL (ref 150–450)
PMV BLD AUTO: 10.8 FL (ref 9.2–12.9)
POTASSIUM SERPL-SCNC: 4.3 MMOL/L (ref 3.5–5.1)
PROT SERPL-MCNC: 6.4 GM/DL (ref 6–8.4)
PROT UR QL STRIP: ABNORMAL
RBC # BLD AUTO: 4.43 M/UL (ref 4.6–6.2)
RBC #/AREA URNS AUTO: >100 /HPF
RELATIVE EOSINOPHIL (SMH): 0.2 % (ref 0–8)
RELATIVE LYMPHOCYTE (SMH): 6.7 % (ref 18–48)
RELATIVE MONOCYTE (SMH): 4.6 % (ref 4–15)
RELATIVE NEUTROPHIL (SMH): 88 % (ref 38–73)
SODIUM SERPL-SCNC: 137 MMOL/L (ref 136–145)
SP GR UR STRIP: 1.01
UROBILINOGEN UR STRIP-ACNC: NEGATIVE EU/DL
WBC # BLD AUTO: 11.3 K/UL (ref 3.9–12.7)
WBC #/AREA URNS AUTO: >100 /HPF

## 2025-03-28 PROCEDURE — 63600175 PHARM REV CODE 636 W HCPCS: Performed by: INTERNAL MEDICINE

## 2025-03-28 PROCEDURE — 81003 URINALYSIS AUTO W/O SCOPE: CPT

## 2025-03-28 PROCEDURE — 25000003 PHARM REV CODE 250: Performed by: FAMILY MEDICINE

## 2025-03-28 PROCEDURE — 12000002 HC ACUTE/MED SURGE SEMI-PRIVATE ROOM

## 2025-03-28 PROCEDURE — 87086 URINE CULTURE/COLONY COUNT: CPT

## 2025-03-28 PROCEDURE — 80053 COMPREHEN METABOLIC PANEL: CPT | Performed by: INTERNAL MEDICINE

## 2025-03-28 PROCEDURE — 25000003 PHARM REV CODE 250

## 2025-03-28 PROCEDURE — 36415 COLL VENOUS BLD VENIPUNCTURE: CPT | Performed by: INTERNAL MEDICINE

## 2025-03-28 PROCEDURE — 83735 ASSAY OF MAGNESIUM: CPT | Performed by: INTERNAL MEDICINE

## 2025-03-28 PROCEDURE — 85025 COMPLETE CBC W/AUTO DIFF WBC: CPT | Performed by: INTERNAL MEDICINE

## 2025-03-28 PROCEDURE — 25000003 PHARM REV CODE 250: Performed by: INTERNAL MEDICINE

## 2025-03-28 RX ORDER — MUPIROCIN 20 MG/G
OINTMENT TOPICAL DAILY
Status: DISCONTINUED | OUTPATIENT
Start: 2025-03-28 | End: 2025-04-01 | Stop reason: HOSPADM

## 2025-03-28 RX ORDER — AMLODIPINE BESYLATE 5 MG/1
10 TABLET ORAL DAILY
Status: DISCONTINUED | OUTPATIENT
Start: 2025-03-29 | End: 2025-04-01 | Stop reason: HOSPADM

## 2025-03-28 RX ORDER — AMOXICILLIN 250 MG
1 CAPSULE ORAL DAILY PRN
Status: DISCONTINUED | OUTPATIENT
Start: 2025-03-28 | End: 2025-04-01 | Stop reason: HOSPADM

## 2025-03-28 RX ADMIN — HYDROCODONE BITARTRATE AND ACETAMINOPHEN 1 TABLET: 5; 325 TABLET ORAL at 02:03

## 2025-03-28 RX ADMIN — FAMOTIDINE 20 MG: 20 TABLET, FILM COATED ORAL at 08:03

## 2025-03-28 RX ADMIN — AMLODIPINE BESYLATE 5 MG: 5 TABLET ORAL at 08:03

## 2025-03-28 RX ADMIN — SENNOSIDES, DOCUSATE SODIUM 1 TABLET: 50; 8.6 TABLET, FILM COATED ORAL at 08:03

## 2025-03-28 RX ADMIN — Medication 6 MG: at 08:03

## 2025-03-28 RX ADMIN — Medication 800 MG: at 08:03

## 2025-03-28 RX ADMIN — ATORVASTATIN CALCIUM 80 MG: 40 TABLET, FILM COATED ORAL at 08:03

## 2025-03-28 RX ADMIN — ASPIRIN 81 MG: 81 TABLET, CHEWABLE ORAL at 08:03

## 2025-03-28 RX ADMIN — MUPIROCIN 1 G: 20 OINTMENT TOPICAL at 11:03

## 2025-03-28 RX ADMIN — CEFTRIAXONE 1 G: 1 INJECTION, POWDER, FOR SOLUTION INTRAMUSCULAR; INTRAVENOUS at 02:03

## 2025-03-28 RX ADMIN — Medication 800 MG: at 12:03

## 2025-03-28 RX ADMIN — METOPROLOL TARTRATE 25 MG: 25 TABLET, FILM COATED ORAL at 08:03

## 2025-03-28 RX ADMIN — LISINOPRIL 10 MG: 5 TABLET ORAL at 08:03

## 2025-03-28 RX ADMIN — CLOPIDOGREL BISULFATE 75 MG: 75 TABLET, FILM COATED ORAL at 09:03

## 2025-03-28 RX ADMIN — HYDROCODONE BITARTRATE AND ACETAMINOPHEN 1 TABLET: 5; 325 TABLET ORAL at 08:03

## 2025-03-28 NOTE — HOSPITAL COURSE
7/31/2023        RE: Irma Walsh  [id number] [facility] [id number]        [facility] GERIATRICS  ACUTE/EPISODIC VISIT    [facility] Medical Record Number: [id number]  Place of Service where encounter took place: [facility] - BIB[facility] ([facility]) [[id number]]    Chief Complaint   Patient presents with     RECHECK     HPI:    Irma Walsh is a 86 year old (1936), who is being seen today for an episodic care visit. HPI information obtained from: facility chart records, facility staff, patient report, and [facility] chart review.    Today's concern is:Recently hospitalized with right femoral neck fracture, s/p right hip yariel arthoplasty. Previous hospitalization with right wrist and left humerus fracture. She is now able to use both arms, so has been able to ambulate with walker. Has some pain in her left arm, milder pain in right hip with walking. She has had some lower blood pressures when she stands up, says she typically stands up, and does not move for a little while, before she tries to ambulate. She feels her breathing is at baseline. Is having bowel movements. Some occasional palpitations and dizziness, says she has been marking this on her heart monitor when it happens.     ALLERGIES:   Allergies   Allergen Reactions     Morphine Nausea and Vomiting     Morphine Hcl Nausea and Vomiting      MEDICATIONS:  Post Discharge Medication Reconciliation Status: medication reconcilation previously completed during another office visit.     Current Outpatient Medications   Medication Sig Dispense Refill     acetaminophen (TYLENOL) 500 MG tablet Take 1,000 mg by mouth 3 times daily       albuterol (PROAIR HFA/PROVENTIL HFA/VENTOLIN HFA) 108 (90 Base) MCG/ACT inhaler INHALE 2 PUFFS INTO THE LUNGS EVERY 6 HOURS AS NEEDED FOR SHORTNESS OF BREATH / DYSPNEA OR WHEEZING 18 g 5     amLODIPine (NORVASC) 10 MG tablet Take 1 tablet (10 mg) by mouth daily       aspirin (ASA) 81  64-year-old male admitted with ureteral colic, imaging showed left ureteral stone with upstream hydronephrosis.  He was admitted and was started on IV antibiotics.  Urology was consulted patient underwent bilateral stent placements.  Urine culture closely followed and no growth till date. Patient continued to have hematuria, he mentioned he was not taking Plavix at home, discontinued it (stent in 2022), patient also complained significant cramping during micturition and debris like discharge in the urine, repeat UA ordered showed few bacteria, retroperitoneal ultrasound repeated does not show any hydronephrosis.  Antibiotics continued, patient instructed to follow up with Urology outpatient.  Pain medications adjusted with improvement in symptoms.    Physical Exam  Constitutional:       General: He is not in acute distress.  Cardiovascular:      Rate and Rhythm: Normal rate and regular rhythm.      Pulses: Normal pulses.  Pulmonary:      Effort: Pulmonary effort is normal. No respiratory distress.      Breath sounds: Normal breath sounds.    "MG EC tablet Take 1 tablet (81 mg) by mouth 2 times daily X 30 days  For DVT prophylaxis       bisacodyl (DULCOLAX) 10 MG suppository Place 1 suppository (10 mg) rectally daily as needed for constipation (Use if Magnesium hydroxide (MILK of MAGNESIA) not effective after 24 hours. May discontinue if patient having bowel movement.)       cyanocobalamin (CYANOCOBALAMIN) 1000 MCG tablet Take 1 tablet (1,000 mcg) by mouth daily       ferrous sulfate (FEROSUL) 325 (65 Fe) MG tablet Take 1 tablet (325 mg) by mouth daily       methocarbamol (ROBAXIN) 500 MG tablet Take 1 tablet (500 mg) by mouth 4 times daily as needed for muscle spasms       metoprolol succinate ER (TOPROL XL) 50 MG 24 hr tablet Take 1 tablet (50 mg) by mouth 2 times daily 200 tablet 5     oxybutynin ER (DITROPAN XL) 5 MG 24 hr tablet Take 1 tablet (5 mg) by mouth daily 90 tablet 3     oxyCODONE (ROXICODONE) 5 MG tablet 2.5mg PO BID x 5 days then 2.5mg daily x 5 days then stop; continue 2.5mg q6h PRN 20 tablet 0     polyethylene glycol (MIRALAX) 17 GM/Dose powder Take 17 g by mouth every other day 510 g      senna-docusate (SENOKOT-S/PERICOLACE) 8.6-50 MG tablet Take 1 tablet by mouth 2 times daily       Medications reviewed:  Medications reconciled to facility chart and changes were made to reflect current medications as identified as above med list. Below are the changes that were made:   Medications stopped since last EPIC medication reconciliation:   There are no discontinued medications.    Medications started since last [facility] medication reconciliation:  No orders of the defined types were placed in this encounter.        REVIEW OF SYSTEMS:  4 point ROS neg other than the symptoms noted above in the HPI.      PHYSICAL EXAM:  /73   Pulse 66   Temp 97.3  F (36.3  C)   Resp 20   Ht 1.651 m (5' 5\")   Wt 49.4 kg (109 lb)   SpO2 96%   BMI 18.14 kg/m    Physical Exam  Cardiovascular:      Rate and Rhythm: Normal rate and regular rhythm.      " Heart sounds: Normal heart sounds.   Pulmonary:      Breath sounds: Normal breath sounds.   Abdominal:      General: Bowel sounds are normal.   Musculoskeletal:      Comments: Decreased ROM to left shoulder, decreased ROM to right hip   Neurological:      Mental Status: She is alert.   Psychiatric:         Mood and Affect: Mood normal.         Thought Content: Thought content normal.         ASSESSMENT / PLAN:  Closed displaced fracture of right femoral neck with routine healing  Aftercare following right hip joint replacement surgery  Physical deconditioning  ? Fall due to syncope. Surgery without complication. Pain controlled, is making progress with therapy.   - oxyCODONE (ROXICODONE) 2.5mg q6h PRN  - APAP TID;   - ASA 81mg daily PPX x 30 days (8/4)  - PT/OT  - follow-up with ortho    Essential hypertension  SVT (supraventricular tachycardia) (H)  BP controlled 100-130, occasional palpitations. Did have SVT noted during recent hospitalization  - continue metoprolol  - continue heart monitor, due to be removed later this week  - follow-up with EP cardiology on 8/21, cardiology on 8/28      Orthostatic hypotension  BP drops 20-30 pints between lying and standing, mildly symptomatic.   - encourage fluid intake  - TG shapes to BLE  - follow-up with cardiology as scheduled  - hesitate to decreased metoprolol given recent SVT      Slow transit constipation  + BM   - continue senna-s 1 tab BID, miralax every other day  - monitor and adjust       Orders:  TG shapes to BLE    Electronically signed by:  KRISTINE Martinez CNP      Sincerely,        KRISTINE Martinez CNP

## 2025-03-28 NOTE — CONSULTS
[facility]  Wound Care    Patient Name:  Stuart Adler   MRN:  [id number]  Date: 3/28/2025  Diagnosis: Left ureteral stone    History:     Past Medical History:   Diagnosis Date    CAD (coronary artery disease)     Ethanolism     Heart attack     Hypertension     Smoker        Social History[1]    Precautions:     Allergies as of 03/27/2025    (No Known Allergies)       WO Assessment Details/Treatment     Wound care rounded with Dr. Lutz. Patient with cancer lesion to left posterior nose. Patient with abrasion to left upper arm and lesion to right thigh area. Patient has multiple lesions to the plantar aspect of both feet resembling plantar warts.      03/28/25 1426   WOCN Assessment   WOCN Total Time (mins) 15   Visit Date 03/28/25   Visit Time 1045   Consult Type New   WOCN Speciality Wound   Wound other;skin tear   Intervention assessed;chart review;coordination of care;consult other service;team conference;orders   Teaching on-going        Wound 03/27/25 1700 Other (comment) Left Nose   Date First Assessed/Time First Assessed: 03/27/25 1700   Present on Original Admission: Yes  Primary Wound Type: Other (comment)  Side: Left  Location: Nose   Dressing Appearance Open to air   Drainage Amount None        Wound 03/27/25 1700 Other (comment) Right Foot   Date First Assessed/Time First Assessed: 03/27/25 1700   Present on Original Admission: Yes  Primary Wound Type: (c) Other (comment)  Side: Right  Location: Foot   Wound Image     Dressing Appearance Open to air   Drainage Amount None        Wound 03/27/25 1700 Other (comment) Left Foot   Date First Assessed/Time First Assessed: 03/27/25 1700   Present on Original Admission: Yes  Primary Wound Type: (c) Other (comment)  Side: Left  Location: Foot   Wound Image      Dressing Appearance Open to air   Drainage Amount None        Wound 03/27/25 1700 Other (comment) Left Cheek   Date First Assessed/Time First Assessed: 03/27/25 1700   Present on  Original Admission: Yes  Primary Wound Type: Other (comment)  Side: Left  Location: Cheek   Dressing Appearance Open to air        Wound 03/27/25 1700 Other (comment) Right Eyebrow   Date First Assessed/Time First Assessed: 03/27/25 1700   Present on Original Admission: Yes  Primary Wound Type: (c) Other (comment)  Side: Right  Location: Eyebrow   Dressing Appearance Open to air       Recommendations made to primary team for Bactroban to face wounds and Triad to thigh, arm and both plantar feet areas. Orders placed as per Dr. Lutz.     03/28/2025         [1]   Social History  Socioeconomic History    Marital status: Single   Tobacco Use    Smoking status: Every Day     Current packs/day: 1.00     Types: Cigarettes   Substance and Sexual Activity    Alcohol use: Yes     Comment: 1/4 bottle whiskey every day    Drug use: Yes     Types: Marijuana     Social Drivers of Health     Financial Resource Strain: High Risk (3/28/2025)    Overall Financial Resource Strain (CARDIA)     Difficulty of Paying Living Expenses: Hard   Food Insecurity: Food Insecurity Present (3/28/2025)    Hunger Vital Sign     Worried About Running Out of Food in the Last Year: Sometimes true     Ran Out of Food in the Last Year: Patient unable to answer   Transportation Needs: No Transportation Needs (3/28/2025)    PRAPARE - Transportation     Lack of Transportation (Medical): No     Lack of Transportation (Non-Medical): No   Physical Activity: Inactive (4/10/2023)    Exercise Vital Sign     Days of Exercise per Week: 0 days     Minutes of Exercise per Session: 0 min   Housing Stability: Low Risk  (3/28/2025)    Housing Stability Vital Sign     Unable to Pay for Housing in the Last Year: No     Homeless in the Last Year: No

## 2025-03-28 NOTE — PLAN OF CARE
[facility]  Discharge Reassessment    Primary Care Provider: No, Primary Doctor    Expected Discharge Date: 3/29/2025    SW met pt at bedside to provide community resources for various needs; pt accepted appreciatively.  SW informed pt that a PCP is needed for HH services.  SW informed pt about NP @ home program and the potential option of home health after establishing primary care.    Reassessment (most recent)       Discharge Reassessment - 03/28/25 1249          Discharge Reassessment    Assessment Type Discharge Planning Reassessment     Discharge Plan A Home     Discharge Plan B Home

## 2025-03-28 NOTE — PLAN OF CARE
Pt was explained WATKINS. Pt verbalized understanding of WATKINS and signed. WATKINS scanned to .      03/28/25 1235   WATKINS Message   Medicare Outpatient and Observation Notification regarding financial responsibility Explained to patient/caregiver;Signed/date by patient/caregiver   Date WATKINS was signed 03/28/25   Time WATKINS was signed [id number]

## 2025-03-28 NOTE — PROGRESS NOTES
[facility] Medicine  Progress Note    Patient Name: Stuart Adler  MRN: [id number]  Patient Class: IP- Inpatient   Admission Date: 3/27/2025  Length of Stay: 0 days  Attending Physician: Nita Nassar, *  Primary Care Provider: No, Primary Doctor        Subjective     Principal Problem:Left ureteral stone        HPI:  64 year old pt getting admitted with Ureteral colic  Pt noticed that he was unable to urinate starting this AM   Also had pain on L abdominal/Flank area  Pain was cramp& constant with radiation of pain to back  Pt came to ER and got admitted   Imaging showed Oval distal left ureteral stone measuring 8 x 5 x 4 mm, with minimal upstream hydroureteronephrosis.     Overview/Hospital Course:  64-year-old male admitted with ureteral colic, imaging showed left ureteral stone with upstream hydronephrosis.  He was admitted and was started on IV antibiotics.  Urology was consulted who removed the stone and placed ureteral stent.  Urine culture closely followed.    Interval History:  Patient is seen and examined.  Underwent stent placement yesterday.  Repeat UA today to evaluate for any drug resistant organisms that were upstream to obstruction.  Review of Systems  Objective:     Vital Signs (Most Recent):  Temp: 97.9 °F (36.6 °C) (03/28/25 0747)  Pulse: (!) 56 (03/28/25 0747)  Resp: 18 (03/28/25 0807)  BP: (!) 182/86 (03/28/25 0807)  SpO2: (!) 94 % (03/28/25 0747) Vital Signs (24h Range):  Temp:  [96.9 °F (36.1 °C)-98.3 °F (36.8 °C)] 97.9 °F (36.6 °C)  Pulse:  [55-68] 56  Resp:  [17-30] 18  SpO2:  [85 %-99 %] 94 %  BP: (100-182)/() 182/86     Weight: 132.8 kg (292 lb 12.3 oz)  Body mass index is 37.59 kg/m².    Intake/Output Summary (Last 24 hours) at 3/28/2025 0936  Last data filed at 3/28/2025 0440  Gross per 24 hour   Intake 1600 ml   Output 530 ml   Net 1070 ml         Physical Exam  Vitals and nursing note reviewed.   Constitutional:       Appearance: He is  well-developed.   HENT:      Head: Atraumatic.      Right Ear: External ear normal.      Left Ear: External ear normal.      Nose: Nose normal.   Eyes:      Comments: One eye   Cardiovascular:      Rate and Rhythm: Normal rate.   Pulmonary:      Effort: Pulmonary effort is normal.   Abdominal:      Palpations: Abdomen is soft.   Musculoskeletal:         General: Normal range of motion.      Cervical back: Full passive range of motion without pain and normal range of motion.   Skin:     General: Skin is warm.   Neurological:      Mental Status: He is alert and oriented to person, place, and time.   Psychiatric:         Behavior: Behavior normal.               Significant Labs: All pertinent labs within the past 24 hours have been reviewed.    Significant Imaging: I have reviewed all pertinent imaging results/findings within the past 24 hours.      Assessment & Plan  Left ureteral stone  Urology consulted  Status post stent placement t  IV antibiotics    Facial basal cell cancer  S/p surgery    Uncontrolled hypertension  Patient's blood pressure range in the last 24 hours was: BP  Min: 138/82  Max: 182/86.The patient's inpatient anti-hypertensive regimen is listed below:  Current Antihypertensives  lisinopriL tablet 10 mg, Daily, Oral  metoprolol tartrate (LOPRESSOR) tablet 25 mg, 2 times daily, Oral  nitroGLYCERIN SL tablet 0.4 mg, Every 5 min PRN, Sublingual  hydrALAZINE injection 10 mg, Every 4 hours PRN, Intravenous  amLODIPine tablet 10 mg, Daily, Oral      VTE Risk Mitigation (From admission, onward)           Ordered     IP VTE HIGH RISK PATIENT  Once         03/27/25 1022     Place sequential compression device  Until discontinued         03/27/25 1022                    Discharge Planning   CLARKE: 3/29/2025     Code Status: Full Code   Medical Readiness for Discharge Date:   Discharge Plan A: Home   Discharge Delays: None known at this time            Please place Justification for PAM MILLER  MD Maday  Department of Hospital Medicine   [facility]

## 2025-03-28 NOTE — CONSULTS
Chief complaint:  Urinary Retention (Pt reports he was last able to urinate around 7 PM)      HPI:  Stuart Adler is a 64 y.o. male presenting with Multiple eschar covered wounds of the face. Pt has a hx of cancer and had resection on the face. Pt has multiple eschars that he reports have been there for over a year. He has not been to dermatology for evaluation and agreed to try on DC. No other complaints today. Pt seen with the wound nurse today.    PMH:  As per HPI and below:  Past Medical History:   Diagnosis Date    CAD (coronary artery disease)     Ethanolism     Heart attack     Hypertension     Smoker        Social History     Socioeconomic History    Marital status: Single   Tobacco Use    Smoking status: Every Day     Current packs/day: 1.00     Types: Cigarettes   Substance and Sexual Activity    Alcohol use: Yes     Comment: 1/4 bottle whiskey every day    Drug use: Yes     Types: Marijuana     Social Drivers of Health     Financial Resource Strain: High Risk (3/28/2025)    Overall Financial Resource Strain (CARDIA)     Difficulty of Paying Living Expenses: Hard   Food Insecurity: Food Insecurity Present (3/28/2025)    Hunger Vital Sign     Worried About Running Out of Food in the Last Year: Sometimes true     Ran Out of Food in the Last Year: Patient unable to answer   Transportation Needs: No Transportation Needs (3/28/2025)    PRAPARE - Transportation     Lack of Transportation (Medical): No     Lack of Transportation (Non-Medical): No   Physical Activity: Inactive (4/10/2023)    Exercise Vital Sign     Days of Exercise per Week: 0 days     Minutes of Exercise per Session: 0 min   Housing Stability: Low Risk  (3/28/2025)    Housing Stability Vital Sign     Unable to Pay for Housing in the Last Year: No     Homeless in the Last Year: No       Past Surgical History:   Procedure Laterality Date    ANGIOGRAM, CORONARY, WITH LEFT HEART CATHETERIZATION N/A 1/28/2022    Procedure: Angiogram, Coronary, with  "Left Heart Cath;  Surgeon: Nacho Orellana MD;  Location: [facility] CATH/EP LAB;  Service: Cardiology;  Laterality: N/A;    CYSTOSCOPY W/ URETERAL STENT PLACEMENT Right 3/27/2025    Procedure: CYSTOSCOPY, WITH URETERAL STENT INSERTION;  Surgeon: Kaleb Gaffney MD;  Location: [facility] OR;  Service: Urology;  Laterality: Right;    FACIAL COSMETIC SURGERY      PERCUTANEOUS CORONARY INTERVENTION (PCI) FOR CHRONIC TOTAL OCCLUSION OF CORONARY ARTERY         Family History   Problem Relation Name Age of Onset    Heart disease Father         Review of patient's allergies indicates:  No Known Allergies    Medications Ordered Prior to Encounter[1]    ROS: As per HPI and below:  Pertinent items are noted in HPI.      Physical Exam:     Vitals:    25 0747 25 0807 25 1217 25 1442   BP: (!) 182/86 (!) 182/86 (!) 174/86    Pulse: (!) 56  (!) 58    Resp: 17 18 20 17   Temp: 97.9 °F (36.6 °C)  97.6 °F (36.4 °C)    TempSrc: Oral  Oral    SpO2: (!) 94%  (!) 94%    Weight:       Height:           BP  Min: 100/61  Max: 191/92  Temp  Av.9 °F (36.6 °C)  Min: 96.9 °F (36.1 °C)  Max: 98.4 °F (36.9 °C)  Pulse  Av.1  Min: 55  Max: 79  Resp  Av.2  Min: 17  Max: 30  SpO2  Av.7 %  Min: 85 %  Max: 99 %  Height  Av' 2" (188 cm)  Min: 6' 2" (188 cm)  Max: 6' 2" (188 cm)  Weight  Av.4 kg (285 lb 2.9 oz)  Min: 122.5 kg (270 lb)  Max: 132.8 kg (292 lb 12.3 oz)    Body mass index is 37.59 kg/m².          General:             Well developed, well nourished, no apparent distress  HEENT:              NCAT, no JVD, mucous membranes moist, EOM intact  Cardiovascular:  Regular rate and rhythm, normal S1, normal S2, No murmurs, rubs, or gallops  Respiratory:        Normal breath sounds, no wheezes, no rales, no rhonchi  Abdomen:           Bowel sounds present, non tender, non distended, no masses, no hepatojugular reflux  Extremities:        No clubbing, no cyanosis, no edema  Vascular:            2+ b/l " radial.  Peripheral pulses intact.  No carotid bruits.  Neurological:      No focal deficits  Skin:                   No obvious rashes or erythema, Multiple eschar covered wounds of the face            Lab Results   Component Value Date    WBC 11.30 03/28/2025    HGB 14.4 03/28/2025    HCT 43.4 03/28/2025    MCV 98 03/28/2025     03/28/2025     Lab Results   Component Value Date    CHOL 233 (H) 04/09/2023     Lab Results   Component Value Date    HDL 43 04/09/2023     Lab Results   Component Value Date    LDLCALC 169.0 (H) 04/09/2023     Lab Results   Component Value Date    TRIG 105 04/09/2023     Lab Results   Component Value Date    CHOLHDL 18.5 (L) 04/09/2023     CMP  Recent Labs   Lab 03/28/25  0527   CALCIUM 8.9   ALBUMIN 3.5      K 4.3   CO2 22*   BUN 19   CREATININE 0.8   ALKPHOS 61   ALT 12   AST 13   BILITOT 0.4      Lab Results   Component Value Date    TSH 1.480 04/09/2023         Assessment and Recommendations       Diagnoses:    Multiple eschar covered wounds of the face    Plan:  Bactroban to facial wounds  Referral to OP dermatology on DC    Complexity:    moderate         [1]   No current facility-administered medications on file prior to encounter.     Current Outpatient Medications on File Prior to Encounter   Medication Sig Dispense Refill    nitroGLYCERIN (NITROSTAT) 0.4 MG SL tablet Place 1 tablet (0.4 mg total) under the tongue every 5 (five) minutes as needed for Chest pain. 30 tablet 5    amLODIPine (NORVASC) 5 MG tablet Take 1 tablet (5 mg total) by mouth once daily. (Patient not taking: Reported on 3/27/2025) 90 tablet 0    aspirin 81 MG Chew CHEW AND SWALLOW 1 TABLET(81 MG) BY MOUTH EVERY DAY (Patient not taking: Reported on 3/27/2025) 90 tablet 0    atorvastatin (LIPITOR) 80 MG tablet Take 1 tablet (80 mg total) by mouth once daily. (Patient not taking: Reported on 3/27/2025) 90 tablet 0    clopidogreL (PLAVIX) 75 mg tablet Take 1 tablet (75 mg total) by mouth once daily.  for 90 doses (Patient not taking: Reported on 3/27/2025) 90 tablet 0    lisinopriL 10 MG tablet Take 1 tablet (10 mg total) by mouth once daily. (Patient not taking: Reported on 3/27/2025) 90 tablet 0    metoprolol tartrate (LOPRESSOR) 25 MG tablet Take 1 tablet (25 mg total) by mouth 2 (two) times daily. (Patient not taking: Reported on 3/27/2025) 180 tablet 0

## 2025-03-28 NOTE — PT/OT/SLP PROGRESS
Physical Therapy      Patient Name:  Stuart Adler   MRN:  [id number]    Patient not seen today secondary to Pain. Will follow-up 3/29/2025.

## 2025-03-28 NOTE — PLAN OF CARE
[facility]  Initial Discharge Assessment       Primary Care Provider: No, Primary Doctor    Admission Diagnosis: Left ureteral calculus [N20.1]  Preop examination [Z01.818]    Admission Date: 3/27/2025  Expected Discharge Date: 3/28/2025    Transition of Care Barriers: Transportation     met with Pt at bedside to complete discharge assessment. Pt AAOx4s. Demographics, pharmacy, and insurance verified. No PCP; pt agreeable to assistance setting up PCP however concerned about transportation (has no vehichle).  No home health, though pt interested in HH at [place]. No dialysis. Pt reports ability to complete ADLs without assistance, though he does have difficulties. SW to provide community resources for financial needs and transportation needs.    Payor: MEDICARE / Plan: MEDICARE PART A & B / Product Type: Government /     Extended Emergency Contact Information  Primary Emergency Contact: oswaldo abdi  Mobile Phone: [phone]  Relation: Daughter  Secondary Emergency Contact: velma freire  Mobile Phone: [phone]  Relation: Friend   needed? No    Discharge Plan A: Home Health  Discharge Plan B: Home      [facility] [address].  [facility].  [facility] [address]  Phone: [phone] Fax: [phone]    [id number] - [facility] [address]  [address] & [address] [address]  Phone: [phone] Fax: [phone]      Initial Assessment (most recent)       Adult Discharge Assessment - 03/28/25 1138          Discharge Assessment    Assessment Type Discharge Planning Assessment     Confirmed/corrected address, phone number and insurance Yes     Confirmed Demographics Correct on Facesheet     Source of Information patient     Does patient/caregiver understand observation status Yes     Communicated CLARKE with patient/caregiver Date not available/Unable to determine     Reason For Admission Left  ureteral stone     People in Home friend(s)     Do you expect to return to your current living situation? Yes     Do you have help at home or someone to help you manage your care at home? No     Prior to hospitilization cognitive status: Unable to Assess     Current cognitive status: Alert/Oriented     Walking or Climbing Stairs Difficulty yes     Dressing/Bathing Difficulty yes     Do you have any problems with: Errands/Grocery     Equipment Currently Used at Home none     Readmission within 30 days? No     Patient currently being followed by outpatient case management? No     Do you currently have service(s) that help you manage your care at home? No     Do you take prescription medications? Yes     Do you have prescription coverage? Yes     Do you have any problems affording any of your prescribed medications? Yes     Is the patient taking medications as prescribed? yes     Who is going to help you get home at discharge? family or taxi     How do you get to doctors appointments? health plan transportation     Are you on dialysis? No     Do you take coumadin? No     Discharge Plan A Home Health     Discharge Plan B Home     Discharge Plan discussed with: Patient     Transition of Care Barriers Transportation        Financial Resource Strain    How hard is it for you to pay for the very basics like food, housing, medical care, and heating? Hard        Housing Stability    In the last 12 months, was there a time when you were not able to pay the mortgage or rent on time? No     At any time in the past 12 months, were you homeless or living in a shelter (including now)? No        Transportation Needs    In the past 12 months, has lack of transportation kept you from medical appointments or from getting medications? No     In the past 12 months, has lack of transportation kept you from meetings, work, or from getting things needed for daily living? No        Food Insecurity    Within the past 12 months, you worried  that your food would run out before you got the money to buy more. Sometimes true     Within the past 12 months, the food you bought just didn't last and you didn't have money to get more. Patient unable to answer

## 2025-03-28 NOTE — ASSESSMENT & PLAN NOTE
Patient's blood pressure range in the last 24 hours was: BP  Min: 138/82  Max: 182/86.The patient's inpatient anti-hypertensive regimen is listed below:  Current Antihypertensives  lisinopriL tablet 10 mg, Daily, Oral  metoprolol tartrate (LOPRESSOR) tablet 25 mg, 2 times daily, Oral  nitroGLYCERIN SL tablet 0.4 mg, Every 5 min PRN, Sublingual  hydrALAZINE injection 10 mg, Every 4 hours PRN, Intravenous  amLODIPine tablet 10 mg, Daily, Oral

## 2025-03-28 NOTE — PLAN OF CARE
Dc reassessment  Waiting for final culture read.  Pt/ot ordered due to patient stating he felt weak- possible DME needs  NP at home order placed for lack of transportation  OP case management referral placed   03/28/25 1439   Discharge Reassessment   Assessment Type Discharge Planning Reassessment   Did the patient's condition or plan change since previous assessment? No   Discharge Plan discussed with: Patient   Communicated CLARKE with patient/caregiver Yes   Discharge Plan A Home   Discharge Plan B Home   DME Needed Upon Discharge  none   Transition of Care Barriers None   Why the patient remains in the hospital Requires continued medical care   Post-Acute Status   Discharge Delays None known at this time

## 2025-03-28 NOTE — SUBJECTIVE & OBJECTIVE
Interval History:  Patient is seen and examined.  Underwent stent placement yesterday.  Repeat UA today to evaluate for any drug resistant organisms that were upstream to obstruction.  Review of Systems  Objective:     Vital Signs (Most Recent):  Temp: 97.9 °F (36.6 °C) (03/28/25 0747)  Pulse: (!) 56 (03/28/25 0747)  Resp: 18 (03/28/25 0807)  BP: (!) 182/86 (03/28/25 0807)  SpO2: (!) 94 % (03/28/25 0747) Vital Signs (24h Range):  Temp:  [96.9 °F (36.1 °C)-98.3 °F (36.8 °C)] 97.9 °F (36.6 °C)  Pulse:  [55-68] 56  Resp:  [17-30] 18  SpO2:  [85 %-99 %] 94 %  BP: (100-182)/() 182/86     Weight: 132.8 kg (292 lb 12.3 oz)  Body mass index is 37.59 kg/m².    Intake/Output Summary (Last 24 hours) at 3/28/2025 0936  Last data filed at 3/28/2025 0440  Gross per 24 hour   Intake 1600 ml   Output 530 ml   Net 1070 ml         Physical Exam  Vitals and nursing note reviewed.   Constitutional:       Appearance: He is well-developed.   HENT:      Head: Atraumatic.      Right Ear: External ear normal.      Left Ear: External ear normal.      Nose: Nose normal.   Eyes:      Comments: One eye   Cardiovascular:      Rate and Rhythm: Normal rate.   Pulmonary:      Effort: Pulmonary effort is normal.   Abdominal:      Palpations: Abdomen is soft.   Musculoskeletal:         General: Normal range of motion.      Cervical back: Full passive range of motion without pain and normal range of motion.   Skin:     General: Skin is warm.   Neurological:      Mental Status: He is alert and oriented to person, place, and time.   Psychiatric:         Behavior: Behavior normal.               Significant Labs: All pertinent labs within the past 24 hours have been reviewed.    Significant Imaging: I have reviewed all pertinent imaging results/findings within the past 24 hours.

## 2025-03-28 NOTE — PLAN OF CARE
Inpatient Upgrade Note     Stuart Adler has warranted treatment spanning two or more midnights of hospital level care for the management of  left uretal stone . He continues to require IV antibiotics, daily labs, and monitoring of vital signs. His condition is also complicated by the following comorbidities: Coronary Artery Disease and Hypertension.

## 2025-03-28 NOTE — PLAN OF CARE
SW met pt at bedside to educate on the usefulness of PT/OT consult for knowing physical limitations, getting recommendations for needed DME, and improving weakness.    SW acknowledged pt's pain and validated difficulties pain includes for therapies.  SW encouraged pt to attempt to participate as best as he could without overexerting himself.

## 2025-03-29 LAB
ABSOLUTE EOSINOPHIL (SMH): 0.03 K/UL
ABSOLUTE MONOCYTE (SMH): 0.94 K/UL (ref 0.3–1)
ABSOLUTE NEUTROPHIL COUNT (SMH): 7.6 K/UL (ref 1.8–7.7)
ALBUMIN SERPL-MCNC: 3.4 G/DL (ref 3.5–5.2)
ALP SERPL-CCNC: 60 UNIT/L (ref 55–135)
ALT SERPL-CCNC: 13 UNIT/L (ref 10–44)
ANION GAP (SMH): 7 MMOL/L (ref 8–16)
AST SERPL-CCNC: 17 UNIT/L (ref 10–40)
BASOPHILS # BLD AUTO: 0.04 K/UL
BASOPHILS NFR BLD AUTO: 0.4 %
BILIRUB SERPL-MCNC: 0.4 MG/DL (ref 0.1–1)
BUN SERPL-MCNC: 20 MG/DL (ref 8–23)
CALCIUM SERPL-MCNC: 8.6 MG/DL (ref 8.7–10.5)
CHLORIDE SERPL-SCNC: 104 MMOL/L (ref 95–110)
CO2 SERPL-SCNC: 28 MMOL/L (ref 23–29)
CREAT SERPL-MCNC: 1 MG/DL (ref 0.5–1.4)
ERYTHROCYTE [DISTWIDTH] IN BLOOD BY AUTOMATED COUNT: 12.9 % (ref 11.5–14.5)
GFR SERPLBLD CREATININE-BSD FMLA CKD-EPI: >60 ML/MIN/1.73/M2
GLUCOSE SERPL-MCNC: 109 MG/DL (ref 70–110)
HCT VFR BLD AUTO: 44.2 % (ref 40–54)
HGB BLD-MCNC: 14.6 GM/DL (ref 14–18)
IMM GRANULOCYTES # BLD AUTO: 0.03 K/UL (ref 0–0.04)
IMM GRANULOCYTES NFR BLD AUTO: 0.3 % (ref 0–0.5)
LYMPHOCYTES # BLD AUTO: 1.82 K/UL (ref 1–4.8)
MAGNESIUM SERPL-MCNC: 2 MG/DL (ref 1.6–2.6)
MCH RBC QN AUTO: 32.6 PG (ref 27–31)
MCHC RBC AUTO-ENTMCNC: 33 G/DL (ref 32–36)
MCV RBC AUTO: 99 FL (ref 82–98)
NUCLEATED RBC (/100WBC) (SMH): 0 /100 WBC
PLATELET # BLD AUTO: 177 K/UL (ref 150–450)
PMV BLD AUTO: 10.9 FL (ref 9.2–12.9)
POTASSIUM SERPL-SCNC: 3.9 MMOL/L (ref 3.5–5.1)
PROT SERPL-MCNC: 6.3 GM/DL (ref 6–8.4)
RBC # BLD AUTO: 4.48 M/UL (ref 4.6–6.2)
RELATIVE EOSINOPHIL (SMH): 0.3 % (ref 0–8)
RELATIVE LYMPHOCYTE (SMH): 17.4 % (ref 18–48)
RELATIVE MONOCYTE (SMH): 9 % (ref 4–15)
RELATIVE NEUTROPHIL (SMH): 72.6 % (ref 38–73)
SODIUM SERPL-SCNC: 139 MMOL/L (ref 136–145)
WBC # BLD AUTO: 10.43 K/UL (ref 3.9–12.7)

## 2025-03-29 PROCEDURE — 97165 OT EVAL LOW COMPLEX 30 MIN: CPT

## 2025-03-29 PROCEDURE — 12000002 HC ACUTE/MED SURGE SEMI-PRIVATE ROOM

## 2025-03-29 PROCEDURE — 25000003 PHARM REV CODE 250: Performed by: INTERNAL MEDICINE

## 2025-03-29 PROCEDURE — 25000003 PHARM REV CODE 250

## 2025-03-29 PROCEDURE — 97535 SELF CARE MNGMENT TRAINING: CPT

## 2025-03-29 PROCEDURE — 36415 COLL VENOUS BLD VENIPUNCTURE: CPT | Performed by: INTERNAL MEDICINE

## 2025-03-29 PROCEDURE — 97161 PT EVAL LOW COMPLEX 20 MIN: CPT

## 2025-03-29 PROCEDURE — 80053 COMPREHEN METABOLIC PANEL: CPT | Performed by: INTERNAL MEDICINE

## 2025-03-29 PROCEDURE — 63600175 PHARM REV CODE 636 W HCPCS: Performed by: INTERNAL MEDICINE

## 2025-03-29 PROCEDURE — 83735 ASSAY OF MAGNESIUM: CPT | Performed by: INTERNAL MEDICINE

## 2025-03-29 PROCEDURE — 97116 GAIT TRAINING THERAPY: CPT

## 2025-03-29 PROCEDURE — 25000003 PHARM REV CODE 250: Performed by: FAMILY MEDICINE

## 2025-03-29 PROCEDURE — 85025 COMPLETE CBC W/AUTO DIFF WBC: CPT | Performed by: INTERNAL MEDICINE

## 2025-03-29 RX ORDER — BISACODYL 5 MG
5 TABLET, DELAYED RELEASE (ENTERIC COATED) ORAL DAILY PRN
Status: DISCONTINUED | OUTPATIENT
Start: 2025-03-29 | End: 2025-04-01 | Stop reason: HOSPADM

## 2025-03-29 RX ADMIN — METOPROLOL TARTRATE 25 MG: 25 TABLET, FILM COATED ORAL at 08:03

## 2025-03-29 RX ADMIN — ASPIRIN 81 MG: 81 TABLET, CHEWABLE ORAL at 10:03

## 2025-03-29 RX ADMIN — SENNOSIDES, DOCUSATE SODIUM 1 TABLET: 50; 8.6 TABLET, FILM COATED ORAL at 04:03

## 2025-03-29 RX ADMIN — FAMOTIDINE 20 MG: 20 TABLET, FILM COATED ORAL at 08:03

## 2025-03-29 RX ADMIN — ALUMINUM HYDROXIDE, MAGNESIUM HYDROXIDE, AND SIMETHICONE 30 ML: 200; 200; 20 SUSPENSION ORAL at 04:03

## 2025-03-29 RX ADMIN — ATORVASTATIN CALCIUM 80 MG: 40 TABLET, FILM COATED ORAL at 08:03

## 2025-03-29 RX ADMIN — HYDROMORPHONE HYDROCHLORIDE 0.5 MG: 1 INJECTION, SOLUTION INTRAMUSCULAR; INTRAVENOUS; SUBCUTANEOUS at 10:03

## 2025-03-29 RX ADMIN — HYDROMORPHONE HYDROCHLORIDE 0.5 MG: 1 INJECTION, SOLUTION INTRAMUSCULAR; INTRAVENOUS; SUBCUTANEOUS at 04:03

## 2025-03-29 RX ADMIN — FAMOTIDINE 20 MG: 20 TABLET, FILM COATED ORAL at 10:03

## 2025-03-29 RX ADMIN — CEFTRIAXONE 1 G: 1 INJECTION, POWDER, FOR SOLUTION INTRAMUSCULAR; INTRAVENOUS at 03:03

## 2025-03-29 RX ADMIN — LISINOPRIL 10 MG: 5 TABLET ORAL at 10:03

## 2025-03-29 RX ADMIN — BISACODYL 5 MG: 5 TABLET, COATED ORAL at 04:03

## 2025-03-29 RX ADMIN — HYDROCODONE BITARTRATE AND ACETAMINOPHEN 1 TABLET: 5; 325 TABLET ORAL at 01:03

## 2025-03-29 RX ADMIN — CLOPIDOGREL BISULFATE 75 MG: 75 TABLET, FILM COATED ORAL at 08:03

## 2025-03-29 RX ADMIN — METOPROLOL TARTRATE 25 MG: 25 TABLET, FILM COATED ORAL at 10:03

## 2025-03-29 RX ADMIN — MUPIROCIN 1 G: 20 OINTMENT TOPICAL at 10:03

## 2025-03-29 RX ADMIN — AMLODIPINE BESYLATE 10 MG: 5 TABLET ORAL at 10:03

## 2025-03-29 NOTE — PT/OT/SLP EVAL
Occupational Therapy   Evaluation and Discharge Note    Name: Stuart Adler  MRN: [id number]  Admitting Diagnosis: Left ureteral stone  Recent Surgery: Procedure(s) (LRB):  CYSTOSCOPY, WITH URETERAL STENT INSERTION (Right) 2 Days Post-Op    Recommendations:     Discharge Recommendations: No Therapy Indicated  Discharge Equipment Recommendations: walker, rolling  Barriers to discharge:  None    Assessment:     Stuart Adler is a 64 y.o. male with a medical diagnosis of Left ureteral stone. At this time, patient is functioning at their prior level of function and does not require further acute OT services.     Plan:     During this hospitalization, patient does not require further acute OT services.  Please re-consult if situation changes.    Plan of Care Reviewed with: patient    Subjective     Chief Complaint: He had a myriad of complaints regarding his current housing, financial and medical situation.    Patient/Family Comments/goals: to get some therapy at home since he has no transportation.    Occupational Profile:  Living Environment: He lives in a Duplex with no steps with a female who he considers his daughter and her children.  Previous level of function: Independent with self care, (-)driving and light housework.   Roles and Routines: active homemaker  Equipment Used at home: none  Assistance upon Discharge: self    Pain/Comfort:  Pain Rating 1: 8/10  Location - Side 1: Bilateral  Location - Orientation 1: generalized  Location 1: abdomen  Pain Addressed 1: Pre-medicate for activity, Reposition, Distraction    Patients cultural, spiritual, Protestant conflicts given the current situation: no    Objective:     Communicated with: nurse prior to session.  Patient found up in chair with telemetry, peripheral IV upon OT entry to room.    General Precautions: Standard,    Orthopedic Precautions: N/A  Braces: N/A  Respiratory Status: Room air     Occupational Performance:    Functional  Mobility/Transfers:  Patient completed Toilet Transfer Step Transfer technique with independence with  no AD  Functional Mobility: He ambulated around the room with no AD with Supervision    Activities of Daily Living:  Grooming: set up assistance for oral care and face washing seated in bedside chair  Toileting: independence in bathroom on toilet.    Cognitive/Visual Perceptual:  Cognitive/Psychosocial Skills:     -       Oriented to: Person, Place, Time, and Situation   -       Follows Commands/attention:Follows one-step commands  -       Communication: clear/fluent  -       Memory: No Deficits noted  -       Safety awareness/insight to disability: intact   -       Mood/Affect/Coping skills/emotional control: Appropriate to situation    Physical Exam:  Balance:    -       good sitting balance  Dominant hand:    -       Right  Upper Extremity Range of Motion:     -       Right Upper Extremity: WFL  -       Left Upper Extremity: WFL  Upper Extremity Strength:    -       Right Upper Extremity: WFL  -       Left Upper Extremity: WFL   Strength:    -       Right Upper Extremity: WFL  -       Left Upper Extremity: WFL  Fine Motor Coordination:    -       Intact  Gross motor coordination:   WFL    AMPAC 6 Click ADL:  AMPAC Total Score: 24    Treatment & Education:  He was educated on Role of Occupational Therapy.  Discussed importance of OOB activity and functional mobility to negate the negative effects of prolonged bedrest during this hospitalization, safe transfers and d/c planning recommendations.     Patient left up in chair with all lines intact and call button in reach    GOALS:   Multidisciplinary Problems       Occupational Therapy Goals       Not on file                  History:     Past Medical History:   Diagnosis Date    CAD (coronary artery disease)     Ethanolism     Heart attack     Hypertension     Smoker          Past Surgical History:   Procedure Laterality Date    ANGIOGRAM, CORONARY, WITH LEFT  HEART CATHETERIZATION N/A 1/28/2022    Procedure: Angiogram, Coronary, with Left Heart Cath;  Surgeon: Nacho Orellana MD;  Location: [facility] CATH/EP LAB;  Service: Cardiology;  Laterality: N/A;    CYSTOSCOPY W/ URETERAL STENT PLACEMENT Right 3/27/2025    Procedure: CYSTOSCOPY, WITH URETERAL STENT INSERTION;  Surgeon: Kaleb Gaffney MD;  Location: [facility] OR;  Service: Urology;  Laterality: Right;    FACIAL COSMETIC SURGERY      PERCUTANEOUS CORONARY INTERVENTION (PCI) FOR CHRONIC TOTAL OCCLUSION OF CORONARY ARTERY         Time Tracking:     OT Date of Treatment: 03/29/25  OT Start Time: 0954  OT Stop Time: 1021  OT Total Time (min): 27 min    Billable Minutes:Evaluation 15  Self Care/Home Management 12    3/29/2025

## 2025-03-29 NOTE — PT/OT/SLP EVAL
Physical Therapy Evaluation and Discharge Note    Patient Name:  Stuart Adler   MRN:  [id number]    Recommendations:     Discharge Recommendations: Low Intensity Therapy  Discharge Equipment Recommendations: walker, rolling   Barriers to discharge: None    Assessment:     Stuart Adler is a 64 y.o. male admitted with a medical diagnosis of Left ureteral stone. .  At this time, patient is functioning at their prior level of function and does not require further acute PT services. Pt found supine in bed at start of session. Pt agreeable to PT at the start of session. Pt able to perform supine<>sit bed mobility with SUP. Sit<>stand transfers with SUP and RW. Pt ambulated 300' with RW and CGA. Pt expressed that he felt that he relied on the walker more than he thought he would.    Recent Surgery: Procedure(s) (LRB):  CYSTOSCOPY, WITH URETERAL STENT INSERTION (Right) 2 Days Post-Op    Plan:     During this hospitalization, patient does not require further acute PT services.  Please re-consult if situation changes.      Subjective     Chief Complaint: pain  Patient/Family Comments/goals: return home  Pain/Comfort:  Pain Rating 1: 2/10  Location - Side 1: Bilateral  Location - Orientation 1: generalized  Location 1: abdomen  Pain Addressed 1: Reposition, Distraction, Cessation of Activity  Pain Rating 2: 2/10  Location - Side 2: Bilateral  Location - Orientation 2: generalized  Location 2: leg  Pain Addressed 2: Reposition, Distraction, Cessation of Activity    Patients cultural, spiritual, Church conflicts given the current situation:      Living Environment:  Pt lives with kids and grandchildren in a 1st floor apartment.    Prior to admission, patients level of function was Independent.  Equipment used at home: none.  DME owned (not currently used): none.  Upon discharge, patient will have assistance from family.    Objective:     Communicated with RN, Megan, prior to session.  Patient found supine with  telemetry, peripheral IV upon PT entry to room.    General Precautions: Standard, fall    Orthopedic Precautions:    Braces:    Respiratory Status: Room air    Exams:  RLE ROM: WFL  RLE Strength: WFL  LLE ROM: WFL  LLE Strength: WFL    Functional Mobility:  Bed Mobility:     Supine to Sit: supervision  Transfers:     Sit to Stand:  supervision with rolling walker  Gait: 300' with RW and CGA    AM-PAC 6 CLICK MOBILITY  Total Score:24       Treatment and Education:  Educated pt on sitting up in chair to help with healing. Spoke with pt about using call button to notify RN.    AM-PAC 6 CLICK MOBILITY  Total Score:24     Patient left sitting edge of bed with all lines intact, call button in reach, RN notified, and OT present.    GOALS:   Multidisciplinary Problems       Physical Therapy Goals       Not on file                    DME Justifications:   Stuart's mobility limitation cannot be sufficiently resolved by the use of a cane. His functional mobility deficit can be sufficiently resolved with the use of a Rolling Walker. Patient's mobility limitation significantly impairs their ability to participate in one of more activities of daily living.  The use of a RW will significantly improve the patient's ability to participate in MRADLS and the patient will use it on regular basis in the home.    History:     Past Medical History:   Diagnosis Date    CAD (coronary artery disease)     Ethanolism     Heart attack     Hypertension     Smoker        Past Surgical History:   Procedure Laterality Date    ANGIOGRAM, CORONARY, WITH LEFT HEART CATHETERIZATION N/A 1/28/2022    Procedure: Angiogram, Coronary, with Left Heart Cath;  Surgeon: Nacho Orellana MD;  Location: [facility] CATH/EP LAB;  Service: Cardiology;  Laterality: N/A;    CYSTOSCOPY W/ URETERAL STENT PLACEMENT Right 3/27/2025    Procedure: CYSTOSCOPY, WITH URETERAL STENT INSERTION;  Surgeon: Kaleb Gaffney MD;  Location: [facility] OR;  Service: Urology;  Laterality:  Right;    FACIAL COSMETIC SURGERY      PERCUTANEOUS CORONARY INTERVENTION (PCI) FOR CHRONIC TOTAL OCCLUSION OF CORONARY ARTERY         Time Tracking:     PT Received On: 03/29/25  PT Start Time: 0929     PT Stop Time: 0954  PT Total Time (min): 25 min     Billable Minutes: Evaluation 10 and Gait Training 15      03/29/2025

## 2025-03-29 NOTE — PROGRESS NOTES
[facility]  Progress Note    Patient Name: Stuart Adler  MRN: [id number]  Patient Class: IP- Inpatient   Admission Date: 3/27/2025  Length of Stay: 1 days  Attending Physician: Nita Nassar, *  Primary Care Provider: No, Primary Doctor        Subjective     Principal Problem:Left ureteral stone        HPI:  64 year old pt getting admitted with Ureteral colic  Pt noticed that he was unable to urinate starting this AM   Also had pain on L abdominal/Flank area  Pain was cramp& constant with radiation of pain to back  Pt came to ER and got admitted   Imaging showed Oval distal left ureteral stone measuring 8 x 5 x 4 mm, with minimal upstream hydroureteronephrosis.     Overview/Hospital Course:  64-year-old male admitted with ureteral colic, imaging showed left ureteral stone with upstream hydronephrosis.  He was admitted and was started on IV antibiotics.  Urology was consulted who removed the stone and placed ureteral stent.  Urine culture closely followed.  Patient had constipation, started on bowel regimen.    Interval History:  Patient is seen and examined.  Repeat urine culture pending.  Had constipation, added Dulcolax.  KUB ordered.      Review of Systems  Objective:     Vital Signs (Most Recent):  Temp: 98.2 °F (36.8 °C) (03/29/25 1133)  Pulse: (!) 53 (03/29/25 1133)  Resp: 19 (03/29/25 1133)  BP: 137/72 (03/29/25 1133)  SpO2: (!) 92 % (03/29/25 1133) Vital Signs (24h Range):  Temp:  [97.7 °F (36.5 °C)-98.2 °F (36.8 °C)] 98.2 °F (36.8 °C)  Pulse:  [53-67] 53  Resp:  [17-20] 19  SpO2:  [92 %-98 %] 92 %  BP: (116-164)/(63-82) 137/72     Weight: 132.8 kg (292 lb 12.3 oz)  Body mass index is 37.59 kg/m².    Intake/Output Summary (Last 24 hours) at 3/29/2025 1557  Last data filed at 3/29/2025 1522  Gross per 24 hour   Intake 480 ml   Output --   Net 480 ml         Physical Exam  Vitals and nursing note reviewed.   Constitutional:       Appearance: He is well-developed.    HENT:      Head: Atraumatic.      Right Ear: External ear normal.      Left Ear: External ear normal.      Nose:      Comments: Left near with keratinized growth, possibly cancer  Eyes:      Comments: One eye   Cardiovascular:      Rate and Rhythm: Normal rate.   Pulmonary:      Effort: Pulmonary effort is normal.   Abdominal:      Palpations: Abdomen is soft.   Musculoskeletal:         General: Normal range of motion.      Cervical back: Full passive range of motion without pain and normal range of motion.   Skin:     General: Skin is warm.   Neurological:      Mental Status: He is alert and oriented to person, place, and time.   Psychiatric:         Behavior: Behavior normal.               Significant Labs: All pertinent labs within the past 24 hours have been reviewed.    Significant Imaging: I have reviewed all pertinent imaging results/findings within the past 24 hours.      Assessment & Plan  Left ureteral stone  Urology consulted  Status post stent placement t  IV antibiotics    Facial basal cell cancer  S/p surgery    Uncontrolled hypertension  Patient's blood pressure range in the last 24 hours was: BP  Min: 116/63  Max: 164/80.The patient's inpatient anti-hypertensive regimen is listed below:  Current Antihypertensives  lisinopriL tablet 10 mg, Daily, Oral  metoprolol tartrate (LOPRESSOR) tablet 25 mg, 2 times daily, Oral  nitroGLYCERIN SL tablet 0.4 mg, Every 5 min PRN, Sublingual  hydrALAZINE injection 10 mg, Every 4 hours PRN, Intravenous  amLODIPine tablet 10 mg, Daily, Oral      VTE Risk Mitigation (From admission, onward)           Ordered     IP VTE HIGH RISK PATIENT  Once         03/27/25 1022     Place sequential compression device  Until discontinued         03/27/25 1022                    Discharge Planning   CLARKE: 3/29/2025     Code Status: Full Code   Medical Readiness for Discharge Date:   Discharge Plan A: Home   Discharge Delays: None known at this time                    Nita MILLER  MD [place]  Department of Hospital Medicine   [facility]

## 2025-03-29 NOTE — SUBJECTIVE & OBJECTIVE
Interval History:  Patient is seen and examined.  Repeat urine culture pending.  Had constipation, added Dulcolax.  KUB ordered.      Review of Systems  Objective:     Vital Signs (Most Recent):  Temp: 98.2 °F (36.8 °C) (03/29/25 1133)  Pulse: (!) 53 (03/29/25 1133)  Resp: 19 (03/29/25 1133)  BP: 137/72 (03/29/25 1133)  SpO2: (!) 92 % (03/29/25 1133) Vital Signs (24h Range):  Temp:  [97.7 °F (36.5 °C)-98.2 °F (36.8 °C)] 98.2 °F (36.8 °C)  Pulse:  [53-67] 53  Resp:  [17-20] 19  SpO2:  [92 %-98 %] 92 %  BP: (116-164)/(63-82) 137/72     Weight: 132.8 kg (292 lb 12.3 oz)  Body mass index is 37.59 kg/m².    Intake/Output Summary (Last 24 hours) at 3/29/2025 1557  Last data filed at 3/29/2025 1522  Gross per 24 hour   Intake 480 ml   Output --   Net 480 ml         Physical Exam  Vitals and nursing note reviewed.   Constitutional:       Appearance: He is well-developed.   HENT:      Head: Atraumatic.      Right Ear: External ear normal.      Left Ear: External ear normal.      Nose:      Comments: Left near with keratinized growth, possibly cancer  Eyes:      Comments: One eye   Cardiovascular:      Rate and Rhythm: Normal rate.   Pulmonary:      Effort: Pulmonary effort is normal.   Abdominal:      Palpations: Abdomen is soft.   Musculoskeletal:         General: Normal range of motion.      Cervical back: Full passive range of motion without pain and normal range of motion.   Skin:     General: Skin is warm.   Neurological:      Mental Status: He is alert and oriented to person, place, and time.   Psychiatric:         Behavior: Behavior normal.               Significant Labs: All pertinent labs within the past 24 hours have been reviewed.    Significant Imaging: I have reviewed all pertinent imaging results/findings within the past 24 hours.

## 2025-03-29 NOTE — ASSESSMENT & PLAN NOTE
Patient's blood pressure range in the last 24 hours was: BP  Min: 116/63  Max: 164/80.The patient's inpatient anti-hypertensive regimen is listed below:  Current Antihypertensives  lisinopriL tablet 10 mg, Daily, Oral  metoprolol tartrate (LOPRESSOR) tablet 25 mg, 2 times daily, Oral  nitroGLYCERIN SL tablet 0.4 mg, Every 5 min PRN, Sublingual  hydrALAZINE injection 10 mg, Every 4 hours PRN, Intravenous  amLODIPine tablet 10 mg, Daily, Oral

## 2025-03-30 LAB
ABSOLUTE EOSINOPHIL (SMH): 0.15 K/UL
ABSOLUTE MONOCYTE (SMH): 0.91 K/UL (ref 0.3–1)
ABSOLUTE NEUTROPHIL COUNT (SMH): 5.9 K/UL (ref 1.8–7.7)
ALBUMIN SERPL-MCNC: 3.6 G/DL (ref 3.5–5.2)
ALP SERPL-CCNC: 65 UNIT/L (ref 55–135)
ALT SERPL-CCNC: 14 UNIT/L (ref 10–44)
ANION GAP (SMH): 7 MMOL/L (ref 8–16)
AST SERPL-CCNC: 15 UNIT/L (ref 10–40)
BACTERIA #/AREA URNS AUTO: ABNORMAL /HPF
BACTERIA UR CULT: NO GROWTH
BASOPHILS # BLD AUTO: 0.05 K/UL
BASOPHILS NFR BLD AUTO: 0.6 %
BILIRUB SERPL-MCNC: 0.5 MG/DL (ref 0.1–1)
BILIRUB UR QL STRIP.AUTO: NEGATIVE
BUN SERPL-MCNC: 18 MG/DL (ref 8–23)
CALCIUM SERPL-MCNC: 8.7 MG/DL (ref 8.7–10.5)
CHLORIDE SERPL-SCNC: 104 MMOL/L (ref 95–110)
CLARITY UR: ABNORMAL
CO2 SERPL-SCNC: 27 MMOL/L (ref 23–29)
COLOR UR AUTO: ABNORMAL
CREAT SERPL-MCNC: 0.9 MG/DL (ref 0.5–1.4)
ERYTHROCYTE [DISTWIDTH] IN BLOOD BY AUTOMATED COUNT: 12.6 % (ref 11.5–14.5)
GFR SERPLBLD CREATININE-BSD FMLA CKD-EPI: >60 ML/MIN/1.73/M2
GLUCOSE SERPL-MCNC: 101 MG/DL (ref 70–110)
GLUCOSE UR QL STRIP: NEGATIVE
HCT VFR BLD AUTO: 47.4 % (ref 40–54)
HGB BLD-MCNC: 15.4 GM/DL (ref 14–18)
HGB UR QL STRIP: ABNORMAL
IMM GRANULOCYTES # BLD AUTO: 0.03 K/UL (ref 0–0.04)
IMM GRANULOCYTES NFR BLD AUTO: 0.3 % (ref 0–0.5)
KETONES UR QL STRIP: NEGATIVE
LEUKOCYTE ESTERASE UR QL STRIP: ABNORMAL
LYMPHOCYTES # BLD AUTO: 1.6 K/UL (ref 1–4.8)
MAGNESIUM SERPL-MCNC: 2.1 MG/DL (ref 1.6–2.6)
MCH RBC QN AUTO: 32.7 PG (ref 27–31)
MCHC RBC AUTO-ENTMCNC: 32.5 G/DL (ref 32–36)
MCV RBC AUTO: 101 FL (ref 82–98)
MICROSCOPIC COMMENT: ABNORMAL
NITRITE UR QL STRIP: NEGATIVE
NUCLEATED RBC (/100WBC) (SMH): 0 /100 WBC
PH UR STRIP: 7 [PH]
PLATELET # BLD AUTO: 175 K/UL (ref 150–450)
PMV BLD AUTO: 11.1 FL (ref 9.2–12.9)
POTASSIUM SERPL-SCNC: 3.7 MMOL/L (ref 3.5–5.1)
PROT SERPL-MCNC: 6.6 GM/DL (ref 6–8.4)
PROT UR QL STRIP: ABNORMAL
RBC # BLD AUTO: 4.71 M/UL (ref 4.6–6.2)
RBC #/AREA URNS AUTO: >100 /HPF
RELATIVE EOSINOPHIL (SMH): 1.7 % (ref 0–8)
RELATIVE LYMPHOCYTE (SMH): 18.6 % (ref 18–48)
RELATIVE MONOCYTE (SMH): 10.6 % (ref 4–15)
RELATIVE NEUTROPHIL (SMH): 68.2 % (ref 38–73)
SODIUM SERPL-SCNC: 138 MMOL/L (ref 136–145)
SP GR UR STRIP: 1.01
SQUAMOUS #/AREA URNS AUTO: 1 /HPF
UROBILINOGEN UR STRIP-ACNC: NEGATIVE EU/DL
WBC # BLD AUTO: 8.61 K/UL (ref 3.9–12.7)
WBC #/AREA URNS AUTO: 6 /HPF

## 2025-03-30 PROCEDURE — 25000003 PHARM REV CODE 250

## 2025-03-30 PROCEDURE — 36415 COLL VENOUS BLD VENIPUNCTURE: CPT | Performed by: INTERNAL MEDICINE

## 2025-03-30 PROCEDURE — 83735 ASSAY OF MAGNESIUM: CPT | Performed by: INTERNAL MEDICINE

## 2025-03-30 PROCEDURE — 25000003 PHARM REV CODE 250: Performed by: FAMILY MEDICINE

## 2025-03-30 PROCEDURE — 85025 COMPLETE CBC W/AUTO DIFF WBC: CPT | Performed by: INTERNAL MEDICINE

## 2025-03-30 PROCEDURE — 12000002 HC ACUTE/MED SURGE SEMI-PRIVATE ROOM

## 2025-03-30 PROCEDURE — 81003 URINALYSIS AUTO W/O SCOPE: CPT

## 2025-03-30 PROCEDURE — 63600175 PHARM REV CODE 636 W HCPCS: Mod: TB,JZ | Performed by: INTERNAL MEDICINE

## 2025-03-30 PROCEDURE — 25000003 PHARM REV CODE 250: Performed by: STUDENT IN AN ORGANIZED HEALTH CARE EDUCATION/TRAINING PROGRAM

## 2025-03-30 PROCEDURE — 25000003 PHARM REV CODE 250: Performed by: INTERNAL MEDICINE

## 2025-03-30 PROCEDURE — 80053 COMPREHEN METABOLIC PANEL: CPT | Performed by: INTERNAL MEDICINE

## 2025-03-30 RX ORDER — BISACODYL 10 MG/1
10 SUPPOSITORY RECTAL ONCE
Status: COMPLETED | OUTPATIENT
Start: 2025-03-30 | End: 2025-03-30

## 2025-03-30 RX ORDER — POLYETHYLENE GLYCOL 3350 17 G/17G
17 POWDER, FOR SOLUTION ORAL ONCE
Status: COMPLETED | OUTPATIENT
Start: 2025-03-30 | End: 2025-03-30

## 2025-03-30 RX ADMIN — HYDROMORPHONE HYDROCHLORIDE 0.5 MG: 1 INJECTION, SOLUTION INTRAMUSCULAR; INTRAVENOUS; SUBCUTANEOUS at 09:03

## 2025-03-30 RX ADMIN — BISACODYL 10 MG: 10 SUPPOSITORY RECTAL at 04:03

## 2025-03-30 RX ADMIN — METOPROLOL TARTRATE 25 MG: 25 TABLET, FILM COATED ORAL at 09:03

## 2025-03-30 RX ADMIN — ASPIRIN 81 MG: 81 TABLET, CHEWABLE ORAL at 09:03

## 2025-03-30 RX ADMIN — FAMOTIDINE 20 MG: 20 TABLET, FILM COATED ORAL at 09:03

## 2025-03-30 RX ADMIN — HYDROMORPHONE HYDROCHLORIDE 0.5 MG: 1 INJECTION, SOLUTION INTRAMUSCULAR; INTRAVENOUS; SUBCUTANEOUS at 11:03

## 2025-03-30 RX ADMIN — LISINOPRIL 10 MG: 5 TABLET ORAL at 09:03

## 2025-03-30 RX ADMIN — HYDROCODONE BITARTRATE AND ACETAMINOPHEN 1 TABLET: 5; 325 TABLET ORAL at 08:03

## 2025-03-30 RX ADMIN — SENNOSIDES, DOCUSATE SODIUM 1 TABLET: 50; 8.6 TABLET, FILM COATED ORAL at 08:03

## 2025-03-30 RX ADMIN — Medication 6 MG: at 08:03

## 2025-03-30 RX ADMIN — ATORVASTATIN CALCIUM 80 MG: 40 TABLET, FILM COATED ORAL at 08:03

## 2025-03-30 RX ADMIN — POLYETHYLENE GLYCOL 3350 17 G: 17 POWDER, FOR SOLUTION ORAL at 08:03

## 2025-03-30 RX ADMIN — AMLODIPINE BESYLATE 10 MG: 5 TABLET ORAL at 09:03

## 2025-03-30 RX ADMIN — CEFTRIAXONE 1 G: 1 INJECTION, POWDER, FOR SOLUTION INTRAMUSCULAR; INTRAVENOUS at 03:03

## 2025-03-30 RX ADMIN — HYDROCODONE BITARTRATE AND ACETAMINOPHEN 1 TABLET: 5; 325 TABLET ORAL at 07:03

## 2025-03-30 RX ADMIN — FAMOTIDINE 20 MG: 20 TABLET, FILM COATED ORAL at 08:03

## 2025-03-30 RX ADMIN — METOPROLOL TARTRATE 25 MG: 25 TABLET, FILM COATED ORAL at 08:03

## 2025-03-30 RX ADMIN — MUPIROCIN 1 G: 20 OINTMENT TOPICAL at 09:03

## 2025-03-30 NOTE — SUBJECTIVE & OBJECTIVE
Interval History:  Patient is seen and examined.  No bowel movement yet, Dulcolax suppository ordered and notified nurse.  He also complained about groin cramping with micturition and ongoing hematuria, was on Plavix postprocedure which we held.  Likely from procedure.  No costochondral tenderness.    Repeated retroperitoneal ultrasound which shows bilateral stents in place.  Hemoglobin stable at 15.4.    Review of Systems  Objective:     Vital Signs (Most Recent):  Temp: 98.2 °F (36.8 °C) (03/30/25 1129)  Pulse: (!) 50 (03/30/25 1129)  Resp: 19 (03/30/25 1129)  BP: 136/76 (03/30/25 1129)  SpO2: 98 % (03/30/25 1129) Vital Signs (24h Range):  Temp:  [98 °F (36.7 °C)-98.7 °F (37.1 °C)] 98.2 °F (36.8 °C)  Pulse:  [50-69] 50  Resp:  [18-19] 19  SpO2:  [96 %-99 %] 98 %  BP: (136-166)/(71-87) 136/76     Weight: 132.8 kg (292 lb 12.3 oz)  Body mass index is 37.59 kg/m².    Intake/Output Summary (Last 24 hours) at 3/30/2025 1508  Last data filed at 3/30/2025 1103  Gross per 24 hour   Intake 480 ml   Output 800 ml   Net -320 ml         Physical Exam  Vitals and nursing note reviewed.   Constitutional:       Appearance: He is well-developed.   HENT:      Head: Atraumatic.      Right Ear: External ear normal.      Left Ear: External ear normal.      Nose:      Comments: Left near with keratinized growth, possibly cancer  Eyes:      Comments: One eye   Cardiovascular:      Rate and Rhythm: Normal rate.   Pulmonary:      Effort: Pulmonary effort is normal.   Abdominal:      Palpations: Abdomen is soft.   Musculoskeletal:         General: Normal range of motion.      Cervical back: Full passive range of motion without pain and normal range of motion.   Skin:     General: Skin is warm.   Neurological:      Mental Status: He is alert and oriented to person, place, and time.   Psychiatric:         Behavior: Behavior normal.               Significant Labs: All pertinent labs within the past 24 hours have been reviewed.    Significant  Imaging: I have reviewed all pertinent imaging results/findings within the past 24 hours.

## 2025-03-30 NOTE — ASSESSMENT & PLAN NOTE
Urology consulted  Status post bilateral stent placement  IV antibiotics  Repeat urine culture - follow up final report  Complained of cramping with micturition, likely postprocedure --> reconsult Urology on Monday when coverage is available  Repeat retroperitoneal ultrasound does not show any hydronephrosis  Pain improves with pain regimen

## 2025-03-30 NOTE — ASSESSMENT & PLAN NOTE
Patient's blood pressure range in the last 24 hours was: BP  Min: 136/76  Max: 166/87.The patient's inpatient anti-hypertensive regimen is listed below:  Current Antihypertensives  lisinopriL tablet 10 mg, Daily, Oral  metoprolol tartrate (LOPRESSOR) tablet 25 mg, 2 times daily, Oral  nitroGLYCERIN SL tablet 0.4 mg, Every 5 min PRN, Sublingual  hydrALAZINE injection 10 mg, Every 4 hours PRN, Intravenous  amLODIPine tablet 10 mg, Daily, Oral

## 2025-03-30 NOTE — PROGRESS NOTES
[facility] Medicine  Progress Note    Patient Name: Stuart Adler  MRN: [id number]  Patient Class: IP- Inpatient   Admission Date: 3/27/2025  Length of Stay: 2 days  Attending Physician: Nita Nassar, *  Primary Care Provider: No, Primary Doctor        Subjective     Principal Problem:Left ureteral stone        HPI:  64 year old pt getting admitted with Ureteral colic  Pt noticed that he was unable to urinate starting this AM   Also had pain on L abdominal/Flank area  Pain was cramp& constant with radiation of pain to back  Pt came to ER and got admitted   Imaging showed Oval distal left ureteral stone measuring 8 x 5 x 4 mm, with minimal upstream hydroureteronephrosis.     Overview/Hospital Course:  64-year-old male admitted with ureteral colic, imaging showed left ureteral stone with upstream hydronephrosis.  He was admitted and was started on IV antibiotics.  Urology was consulted patient underwent bilateral stent placements.  Urine culture closely followed and no growth till date.  Patient had constipation, started on bowel regimen but did not improve and hence required suppository.      Patient continued to have hematuria, he mentioned he was not taking Plavix at home, discontinued it, patient also complained significant cramping during micturition and debris like discharge in the urine, repeat UA ordered, retroperitoneal ultrasound repeated does not show any hydronephrosis.    Interval History:  Patient is seen and examined.  No bowel movement yet, Dulcolax suppository ordered and notified nurse.  He also complained about groin cramping with micturition and ongoing hematuria, was on Plavix postprocedure which we held.  Likely from procedure.  No costochondral tenderness.    Repeated retroperitoneal ultrasound which shows bilateral stents in place.  Hemoglobin stable at 15.4.    Review of Systems  Objective:     Vital Signs (Most Recent):  Temp: 98.2 °F (36.8 °C) (03/30/25  1129)  Pulse: (!) 50 (03/30/25 1129)  Resp: 19 (03/30/25 1129)  BP: 136/76 (03/30/25 1129)  SpO2: 98 % (03/30/25 1129) Vital Signs (24h Range):  Temp:  [98 °F (36.7 °C)-98.7 °F (37.1 °C)] 98.2 °F (36.8 °C)  Pulse:  [50-69] 50  Resp:  [18-19] 19  SpO2:  [96 %-99 %] 98 %  BP: (136-166)/(71-87) 136/76     Weight: 132.8 kg (292 lb 12.3 oz)  Body mass index is 37.59 kg/m².    Intake/Output Summary (Last 24 hours) at 3/30/2025 1508  Last data filed at 3/30/2025 1103  Gross per 24 hour   Intake 480 ml   Output 800 ml   Net -320 ml         Physical Exam  Vitals and nursing note reviewed.   Constitutional:       Appearance: He is well-developed.   HENT:      Head: Atraumatic.      Right Ear: External ear normal.      Left Ear: External ear normal.      Nose:      Comments: Left near with keratinized growth, possibly cancer  Eyes:      Comments: One eye   Cardiovascular:      Rate and Rhythm: Normal rate.   Pulmonary:      Effort: Pulmonary effort is normal.   Abdominal:      Palpations: Abdomen is soft.   Musculoskeletal:         General: Normal range of motion.      Cervical back: Full passive range of motion without pain and normal range of motion.   Skin:     General: Skin is warm.   Neurological:      Mental Status: He is alert and oriented to person, place, and time.   Psychiatric:         Behavior: Behavior normal.               Significant Labs: All pertinent labs within the past 24 hours have been reviewed.    Significant Imaging: I have reviewed all pertinent imaging results/findings within the past 24 hours.      Assessment & Plan  Left ureteral stone  Urology consulted  Status post bilateral stent placement  IV antibiotics  Repeat urine culture - follow up final report  Complained of cramping with micturition, likely postprocedure --> reconsult Urology on Monday when coverage is available  Repeat retroperitoneal ultrasound does not show any hydronephrosis  Pain improves with pain regimen      Facial basal cell  cancer  S/p surgery  Has new lesions on nose, requires outpatient follow up    Uncontrolled hypertension  Patient's blood pressure range in the last 24 hours was: BP  Min: 136/76  Max: 166/87.The patient's inpatient anti-hypertensive regimen is listed below:  Current Antihypertensives  lisinopriL tablet 10 mg, Daily, Oral  metoprolol tartrate (LOPRESSOR) tablet 25 mg, 2 times daily, Oral  nitroGLYCERIN SL tablet 0.4 mg, Every 5 min PRN, Sublingual  hydrALAZINE injection 10 mg, Every 4 hours PRN, Intravenous  amLODIPine tablet 10 mg, Daily, Oral      VTE Risk Mitigation (From admission, onward)           Ordered     IP VTE HIGH RISK PATIENT  Once         03/27/25 1022     Place sequential compression device  Until discontinued         03/27/25 1022                    Discharge Planning   CLARKE: 3/31/2025     Code Status: Full Code   Medical Readiness for Discharge Date:   Discharge Plan A: Home   Discharge Delays: None known at this time                    Nita Nassar MD  Department of Hospital Medicine   [facility]

## 2025-03-31 ENCOUNTER — TELEPHONE (OUTPATIENT)
Dept: HOME HEALTH SERVICES | Facility: CLINIC | Age: 65
End: 2025-03-31
Payer: MEDICARE

## 2025-03-31 PROBLEM — K59.00 CONSTIPATION: Status: ACTIVE | Noted: 2025-03-31

## 2025-03-31 LAB
ABSOLUTE EOSINOPHIL (SMH): 0.27 K/UL
ABSOLUTE MONOCYTE (SMH): 0.93 K/UL (ref 0.3–1)
ABSOLUTE NEUTROPHIL COUNT (SMH): 5.6 K/UL (ref 1.8–7.7)
ALBUMIN SERPL-MCNC: 3.5 G/DL (ref 3.5–5.2)
ALP SERPL-CCNC: 63 UNIT/L (ref 55–135)
ALT SERPL-CCNC: 14 UNIT/L (ref 10–44)
ANION GAP (SMH): 5 MMOL/L (ref 8–16)
AST SERPL-CCNC: 13 UNIT/L (ref 10–40)
BACTERIA UR CULT: NO GROWTH
BASOPHILS # BLD AUTO: 0.04 K/UL
BASOPHILS NFR BLD AUTO: 0.5 %
BILIRUB SERPL-MCNC: 0.5 MG/DL (ref 0.1–1)
BUN SERPL-MCNC: 23 MG/DL (ref 8–23)
CALCIUM SERPL-MCNC: 8.8 MG/DL (ref 8.7–10.5)
CHLORIDE SERPL-SCNC: 104 MMOL/L (ref 95–110)
CO2 SERPL-SCNC: 28 MMOL/L (ref 23–29)
CREAT SERPL-MCNC: 0.9 MG/DL (ref 0.5–1.4)
ERYTHROCYTE [DISTWIDTH] IN BLOOD BY AUTOMATED COUNT: 12.8 % (ref 11.5–14.5)
GFR SERPLBLD CREATININE-BSD FMLA CKD-EPI: >60 ML/MIN/1.73/M2
GLUCOSE SERPL-MCNC: 106 MG/DL (ref 70–110)
HCT VFR BLD AUTO: 45.4 % (ref 40–54)
HGB BLD-MCNC: 14.9 GM/DL (ref 14–18)
IMM GRANULOCYTES # BLD AUTO: 0.04 K/UL (ref 0–0.04)
IMM GRANULOCYTES NFR BLD AUTO: 0.5 % (ref 0–0.5)
LYMPHOCYTES # BLD AUTO: 1.82 K/UL (ref 1–4.8)
MAGNESIUM SERPL-MCNC: 2.2 MG/DL (ref 1.6–2.6)
MCH RBC QN AUTO: 32.5 PG (ref 27–31)
MCHC RBC AUTO-ENTMCNC: 32.8 G/DL (ref 32–36)
MCV RBC AUTO: 99 FL (ref 82–98)
NUCLEATED RBC (/100WBC) (SMH): 0 /100 WBC
PLATELET # BLD AUTO: 185 K/UL (ref 150–450)
PMV BLD AUTO: 10.8 FL (ref 9.2–12.9)
POTASSIUM SERPL-SCNC: 4.1 MMOL/L (ref 3.5–5.1)
PROT SERPL-MCNC: 6.5 GM/DL (ref 6–8.4)
RBC # BLD AUTO: 4.59 M/UL (ref 4.6–6.2)
RELATIVE EOSINOPHIL (SMH): 3.1 % (ref 0–8)
RELATIVE LYMPHOCYTE (SMH): 21 % (ref 18–48)
RELATIVE MONOCYTE (SMH): 10.8 % (ref 4–15)
RELATIVE NEUTROPHIL (SMH): 64.1 % (ref 38–73)
SODIUM SERPL-SCNC: 137 MMOL/L (ref 136–145)
WBC # BLD AUTO: 8.65 K/UL (ref 3.9–12.7)

## 2025-03-31 PROCEDURE — 85025 COMPLETE CBC W/AUTO DIFF WBC: CPT | Performed by: INTERNAL MEDICINE

## 2025-03-31 PROCEDURE — 83735 ASSAY OF MAGNESIUM: CPT | Performed by: INTERNAL MEDICINE

## 2025-03-31 PROCEDURE — 25000003 PHARM REV CODE 250

## 2025-03-31 PROCEDURE — 63600175 PHARM REV CODE 636 W HCPCS: Performed by: INTERNAL MEDICINE

## 2025-03-31 PROCEDURE — 36415 COLL VENOUS BLD VENIPUNCTURE: CPT | Performed by: INTERNAL MEDICINE

## 2025-03-31 PROCEDURE — 12000002 HC ACUTE/MED SURGE SEMI-PRIVATE ROOM

## 2025-03-31 PROCEDURE — 82040 ASSAY OF SERUM ALBUMIN: CPT | Performed by: INTERNAL MEDICINE

## 2025-03-31 PROCEDURE — 25000003 PHARM REV CODE 250: Performed by: FAMILY MEDICINE

## 2025-03-31 PROCEDURE — 25000003 PHARM REV CODE 250: Performed by: INTERNAL MEDICINE

## 2025-03-31 RX ORDER — POLYETHYLENE GLYCOL 3350 17 G/17G
17 POWDER, FOR SOLUTION ORAL DAILY
Status: DISCONTINUED | OUTPATIENT
Start: 2025-03-31 | End: 2025-04-01 | Stop reason: HOSPADM

## 2025-03-31 RX ORDER — DOCUSATE SODIUM 100 MG/1
100 CAPSULE, LIQUID FILLED ORAL 2 TIMES DAILY
Status: DISCONTINUED | OUTPATIENT
Start: 2025-03-31 | End: 2025-04-01 | Stop reason: HOSPADM

## 2025-03-31 RX ADMIN — SENNOSIDES, DOCUSATE SODIUM 1 TABLET: 50; 8.6 TABLET, FILM COATED ORAL at 08:03

## 2025-03-31 RX ADMIN — DOCUSATE SODIUM 100 MG: 100 CAPSULE, LIQUID FILLED ORAL at 10:03

## 2025-03-31 RX ADMIN — HYDROCODONE BITARTRATE AND ACETAMINOPHEN 1 TABLET: 5; 325 TABLET ORAL at 03:03

## 2025-03-31 RX ADMIN — CEFTRIAXONE 1 G: 1 INJECTION, POWDER, FOR SOLUTION INTRAMUSCULAR; INTRAVENOUS at 03:03

## 2025-03-31 RX ADMIN — METOPROLOL TARTRATE 25 MG: 25 TABLET, FILM COATED ORAL at 08:03

## 2025-03-31 RX ADMIN — POLYETHYLENE GLYCOL 3350 17 G: 17 POWDER, FOR SOLUTION ORAL at 10:03

## 2025-03-31 RX ADMIN — FAMOTIDINE 20 MG: 20 TABLET, FILM COATED ORAL at 08:03

## 2025-03-31 RX ADMIN — HYDROMORPHONE HYDROCHLORIDE 0.5 MG: 1 INJECTION, SOLUTION INTRAMUSCULAR; INTRAVENOUS; SUBCUTANEOUS at 08:03

## 2025-03-31 RX ADMIN — HYDROMORPHONE HYDROCHLORIDE 0.5 MG: 1 INJECTION, SOLUTION INTRAMUSCULAR; INTRAVENOUS; SUBCUTANEOUS at 10:03

## 2025-03-31 RX ADMIN — MUPIROCIN 1 G: 20 OINTMENT TOPICAL at 08:03

## 2025-03-31 RX ADMIN — ASPIRIN 81 MG: 81 TABLET, CHEWABLE ORAL at 08:03

## 2025-03-31 RX ADMIN — ATORVASTATIN CALCIUM 80 MG: 40 TABLET, FILM COATED ORAL at 08:03

## 2025-03-31 RX ADMIN — AMLODIPINE BESYLATE 10 MG: 5 TABLET ORAL at 08:03

## 2025-03-31 RX ADMIN — LACTULOSE 20 G: 20 SOLUTION ORAL at 04:03

## 2025-03-31 RX ADMIN — HYDROCODONE BITARTRATE AND ACETAMINOPHEN 1 TABLET: 5; 325 TABLET ORAL at 09:03

## 2025-03-31 RX ADMIN — DOCUSATE SODIUM 100 MG: 100 CAPSULE, LIQUID FILLED ORAL at 08:03

## 2025-03-31 RX ADMIN — Medication 6 MG: at 08:03

## 2025-03-31 RX ADMIN — LISINOPRIL 10 MG: 5 TABLET ORAL at 08:03

## 2025-03-31 NOTE — PROGRESS NOTES
[facility] Medicine  Progress Note    Patient Name: Stuart Adler  MRN: [id number]  Patient Class: IP- Inpatient   Admission Date: 3/27/2025  Length of Stay: 3 days  Attending Physician: Hawa Chavarria MD  Primary Care Provider: Jalyn, Primary Doctor        Subjective     Principal Problem:Left ureteral stone        HPI:  64 year old pt getting admitted with Ureteral colic  Pt noticed that he was unable to urinate starting this AM   Also had pain on L abdominal/Flank area  Pain was cramp& constant with radiation of pain to back  Pt came to ER and got admitted   Imaging showed Oval distal left ureteral stone measuring 8 x 5 x 4 mm, with minimal upstream hydroureteronephrosis.     Overview/Hospital Course:  64-year-old male admitted with ureteral colic, imaging showed left ureteral stone with upstream hydronephrosis.  He was admitted and was started on IV antibiotics.  Urology was consulted patient underwent bilateral stent placements.  Urine culture closely followed and no growth till date.  Patient had constipation, started on bowel regimen but did not improve and hence required suppository.      Patient continued to have hematuria, he mentioned he was not taking Plavix at home, discontinued it, patient also complained significant cramping during micturition and debris like discharge in the urine, repeat UA ordered, retroperitoneal ultrasound repeated does not show any hydronephrosis.    Interval History:  Patient awake alert in no acute distress.  Remains afebrile.  Denies chest pain shortness on breath.  Complains of pain in bilateral groin region when he urinates which radiates to his lower back.  No tenderness to palpation or any CVA tenderness bilaterally.  States he has not had a bowel movement in 5 days.  He is passing gas.  No significant tenderness to abdominal palpation either.  We will get a repeat KUB today and also add Colace 100 b.i.d. and p.r.n. lactulose.  Informed patient  that if patient has significant constipation and no relief then may need to do an enema.  Urology will also be informed today of persistent pain when he urinates.  Case discussed with patient's nurse    Review of Systems   Constitutional: Negative.    HENT: Negative.     Eyes: Negative.    Respiratory: Negative.     Cardiovascular: Negative.    Gastrointestinal:  Positive for constipation.   Endocrine: Negative.    Genitourinary:  Positive for dysuria.   Musculoskeletal: Negative.    Skin: Negative.    Allergic/Immunologic: Negative.    Neurological: Negative.    Hematological: Negative.    Psychiatric/Behavioral: Negative.     All other systems reviewed and are negative.    Objective:     Vital Signs (Most Recent):  Temp: 98.5 °F (36.9 °C) (03/31/25 0709)  Pulse: 60 (03/31/25 0858)  Resp: 18 (03/31/25 0907)  BP: 121/79 (03/31/25 0858)  SpO2: (!) 94 % (03/31/25 0815) Vital Signs (24h Range):  Temp:  [97.4 °F (36.3 °C)-98.5 °F (36.9 °C)] 98.5 °F (36.9 °C)  Pulse:  [50-60] 60  Resp:  [17-19] 18  SpO2:  [36 %-98 %] 94 %  BP: (109-158)/(63-83) 121/79     Weight: 132.8 kg (292 lb 12.3 oz)  Body mass index is 37.59 kg/m².    Intake/Output Summary (Last 24 hours) at 3/31/2025 0934  Last data filed at 3/31/2025 0654  Gross per 24 hour   Intake 360 ml   Output 1100 ml   Net -740 ml         Physical Exam  Vitals and nursing note reviewed.   Constitutional:       General: He is not in acute distress.     Appearance: He is obese.   HENT:      Head: Normocephalic and atraumatic.      Nose: Nose normal.      Mouth/Throat:      Mouth: Mucous membranes are moist.   Eyes:      Comments: Right eye covered with bandages with no bleeding or discharge   Cardiovascular:      Rate and Rhythm: Normal rate and regular rhythm.      Pulses: Normal pulses.      Heart sounds: Normal heart sounds.   Pulmonary:      Effort: Pulmonary effort is normal. No respiratory distress.      Breath sounds: Normal breath sounds.   Abdominal:      General:  Bowel sounds are normal.      Palpations: Abdomen is soft.   Musculoskeletal:         General: Normal range of motion.      Cervical back: Normal range of motion and neck supple.   Skin:     General: Skin is warm and dry.   Neurological:      General: No focal deficit present.      Mental Status: He is alert and oriented to person, place, and time.      Cranial Nerves: No cranial nerve deficit.   Psychiatric:         Mood and Affect: Mood normal.               Significant Labs: All pertinent labs within the past 24 hours have been reviewed.    Significant Imaging: I have reviewed all pertinent imaging results/findings within the past 24 hours.      Assessment & Plan  Left ureteral stone  Urology consulted  Status post bilateral stent placement  IV antibiotics  Repeat urine culture - follow up final report  Complained of cramping with micturition, likely postprocedure --> reconsulted Urology on Monday when coverage is available  Repeat retroperitoneal ultrasound does not show any hydronephrosis  Pain improves with pain regimen      Facial basal cell cancer  S/p surgery  Has new lesions on nose, requires outpatient follow up    Uncontrolled hypertension  Patient's blood pressure range in the last 24 hours was: BP  Min: 109/63  Max: 158/72.The patient's inpatient anti-hypertensive regimen is listed below:  Current Antihypertensives  lisinopriL tablet 10 mg, Daily, Oral  metoprolol tartrate (LOPRESSOR) tablet 25 mg, 2 times daily, Oral  nitroGLYCERIN SL tablet 0.4 mg, Every 5 min PRN, Sublingual  hydrALAZINE injection 10 mg, Every 4 hours PRN, Intravenous  amLODIPine tablet 10 mg, Daily, Oral      Constipation  -patient states he is passing gas.  No abdominal tenderness to palpation  -repeat KUB on 03/31/2025 = pending   -added Colace 100 mg p.o. b.i.d.  -added MiraLax 17 g p.o. daily  -cover with the lactulose PO TID prn  -if no relief with oral laxatives and stool softeners may require an enema      VTE Risk  Mitigation (From admission, onward)           Ordered     IP VTE HIGH RISK PATIENT  Once         03/27/25 1022     Place sequential compression device  Until discontinued         03/27/25 1022                    Discharge Planning   CLARKE: 3/31/2025     Code Status: Full Code   Medical Readiness for Discharge Date:   Discharge Plan A: Home   Discharge Delays: None known at this time                    Hawa Chavarria MD  Department of Hospital Medicine   [facility]

## 2025-03-31 NOTE — SUBJECTIVE & OBJECTIVE
Interval History:  Patient awake alert in no acute distress.  Remains afebrile.  Denies chest pain shortness on breath.  Complains of pain in bilateral groin region when he urinates which radiates to his lower back.  No tenderness to palpation or any CVA tenderness bilaterally.  States he has not had a bowel movement in 5 days.  He is passing gas.  No significant tenderness to abdominal palpation either.  We will get a repeat KUB today and also add Colace 100 b.i.d. and p.r.n. lactulose.  Informed patient that if patient has significant constipation and no relief then may need to do an enema.  Urology will also be informed today of persistent pain when he urinates.  Case discussed with patient's nurse    Review of Systems   Constitutional: Negative.    HENT: Negative.     Eyes: Negative.    Respiratory: Negative.     Cardiovascular: Negative.    Gastrointestinal:  Positive for constipation.   Endocrine: Negative.    Genitourinary:  Positive for dysuria.   Musculoskeletal: Negative.    Skin: Negative.    Allergic/Immunologic: Negative.    Neurological: Negative.    Hematological: Negative.    Psychiatric/Behavioral: Negative.     All other systems reviewed and are negative.    Objective:     Vital Signs (Most Recent):  Temp: 98.5 °F (36.9 °C) (03/31/25 0709)  Pulse: 60 (03/31/25 0858)  Resp: 18 (03/31/25 0907)  BP: 121/79 (03/31/25 0858)  SpO2: (!) 94 % (03/31/25 0815) Vital Signs (24h Range):  Temp:  [97.4 °F (36.3 °C)-98.5 °F (36.9 °C)] 98.5 °F (36.9 °C)  Pulse:  [50-60] 60  Resp:  [17-19] 18  SpO2:  [36 %-98 %] 94 %  BP: (109-158)/(63-83) 121/79     Weight: 132.8 kg (292 lb 12.3 oz)  Body mass index is 37.59 kg/m².    Intake/Output Summary (Last 24 hours) at 3/31/2025 0935  Last data filed at 3/31/2025 0654  Gross per 24 hour   Intake 360 ml   Output 1100 ml   Net -740 ml         Physical Exam  Vitals and nursing note reviewed.   Constitutional:       General: He is not in acute distress.     Appearance: He is  obese.   HENT:      Head: Normocephalic and atraumatic.      Nose: Nose normal.      Mouth/Throat:      Mouth: Mucous membranes are moist.   Eyes:      Comments: Right eye covered with bandages with no bleeding or discharge   Cardiovascular:      Rate and Rhythm: Normal rate and regular rhythm.      Pulses: Normal pulses.      Heart sounds: Normal heart sounds.   Pulmonary:      Effort: Pulmonary effort is normal. No respiratory distress.      Breath sounds: Normal breath sounds.   Abdominal:      General: Bowel sounds are normal.      Palpations: Abdomen is soft.   Musculoskeletal:         General: Normal range of motion.      Cervical back: Normal range of motion and neck supple.   Skin:     General: Skin is warm and dry.   Neurological:      General: No focal deficit present.      Mental Status: He is alert and oriented to person, place, and time.      Cranial Nerves: No cranial nerve deficit.   Psychiatric:         Mood and Affect: Mood normal.               Significant Labs: All pertinent labs within the past 24 hours have been reviewed.    Significant Imaging: I have reviewed all pertinent imaging results/findings within the past 24 hours.

## 2025-03-31 NOTE — PLAN OF CARE
Problem: Adult Inpatient Plan of Care  Goal: Plan of Care Review  Outcome: Progressing  Goal: Patient-Specific Goal (Individualized)  Outcome: Progressing  Goal: Absence of Hospital-Acquired Illness or Injury  Outcome: Progressing  Goal: Readiness for Transition of Care  Outcome: Progressing     Problem: Wound  Goal: Optimal Coping  Outcome: Progressing  Goal: Optimal Functional Ability  Outcome: Progressing  Goal: Absence of Infection Signs and Symptoms  Outcome: Progressing  Goal: Improved Oral Intake  Outcome: Progressing

## 2025-03-31 NOTE — ASSESSMENT & PLAN NOTE
-patient states he is passing gas.  No abdominal tenderness to palpation  -repeat KUB on 03/31/2025 = pending   -added Colace 100 mg p.o. b.i.d.  -added MiraLax 17 g p.o. daily  -cover with the lactulose PO TID prn  -if no relief with oral laxatives and stool softeners may require an enema

## 2025-03-31 NOTE — ASSESSMENT & PLAN NOTE
Patient's blood pressure range in the last 24 hours was: BP  Min: 109/63  Max: 158/72.The patient's inpatient anti-hypertensive regimen is listed below:  Current Antihypertensives  lisinopriL tablet 10 mg, Daily, Oral  metoprolol tartrate (LOPRESSOR) tablet 25 mg, 2 times daily, Oral  nitroGLYCERIN SL tablet 0.4 mg, Every 5 min PRN, Sublingual  hydrALAZINE injection 10 mg, Every 4 hours PRN, Intravenous  amLODIPine tablet 10 mg, Daily, Oral

## 2025-03-31 NOTE — ASSESSMENT & PLAN NOTE
Urology consulted  Status post bilateral stent placement  IV antibiotics  Repeat urine culture - follow up final report  Complained of cramping with micturition, likely postprocedure --> reconsulted Urology on Monday when coverage is available  Repeat retroperitoneal ultrasound does not show any hydronephrosis  Pain improves with pain regimen

## 2025-04-01 ENCOUNTER — CLINICAL SUPPORT (OUTPATIENT)
Dept: SMOKING CESSATION | Facility: CLINIC | Age: 65
End: 2025-04-01

## 2025-04-01 VITALS
OXYGEN SATURATION: 96 % | WEIGHT: 292.75 LBS | DIASTOLIC BLOOD PRESSURE: 79 MMHG | RESPIRATION RATE: 18 BRPM | BODY MASS INDEX: 37.57 KG/M2 | HEIGHT: 74 IN | HEART RATE: 50 BPM | TEMPERATURE: 98 F | SYSTOLIC BLOOD PRESSURE: 129 MMHG

## 2025-04-01 DIAGNOSIS — F17.200 NICOTINE DEPENDENCE: Primary | ICD-10-CM

## 2025-04-01 PROBLEM — K59.00 CONSTIPATION: Status: RESOLVED | Noted: 2025-03-31 | Resolved: 2025-04-01

## 2025-04-01 LAB
ABSOLUTE EOSINOPHIL (SMH): 0.3 K/UL
ABSOLUTE MONOCYTE (SMH): 0.88 K/UL (ref 0.3–1)
ABSOLUTE NEUTROPHIL COUNT (SMH): 6.2 K/UL (ref 1.8–7.7)
ALBUMIN SERPL-MCNC: 3.7 G/DL (ref 3.5–5.2)
ALP SERPL-CCNC: 62 UNIT/L (ref 55–135)
ALT SERPL-CCNC: 13 UNIT/L (ref 10–44)
ANION GAP (SMH): 7 MMOL/L (ref 8–16)
AST SERPL-CCNC: 12 UNIT/L (ref 10–40)
BASOPHILS # BLD AUTO: 0.05 K/UL
BASOPHILS NFR BLD AUTO: 0.6 %
BILIRUB SERPL-MCNC: 0.5 MG/DL (ref 0.1–1)
BUN SERPL-MCNC: 23 MG/DL (ref 8–23)
CALCIUM SERPL-MCNC: 8.7 MG/DL (ref 8.7–10.5)
CHLORIDE SERPL-SCNC: 104 MMOL/L (ref 95–110)
CO2 SERPL-SCNC: 26 MMOL/L (ref 23–29)
CREAT SERPL-MCNC: 1 MG/DL (ref 0.5–1.4)
ERYTHROCYTE [DISTWIDTH] IN BLOOD BY AUTOMATED COUNT: 12.8 % (ref 11.5–14.5)
GFR SERPLBLD CREATININE-BSD FMLA CKD-EPI: >60 ML/MIN/1.73/M2
GLUCOSE SERPL-MCNC: 126 MG/DL (ref 70–110)
HCT VFR BLD AUTO: 45.7 % (ref 40–54)
HGB BLD-MCNC: 15.2 GM/DL (ref 14–18)
IMM GRANULOCYTES # BLD AUTO: 0.03 K/UL (ref 0–0.04)
IMM GRANULOCYTES NFR BLD AUTO: 0.3 % (ref 0–0.5)
LYMPHOCYTES # BLD AUTO: 1.61 K/UL (ref 1–4.8)
MAGNESIUM SERPL-MCNC: 2.2 MG/DL (ref 1.6–2.6)
MCH RBC QN AUTO: 32.9 PG (ref 27–31)
MCHC RBC AUTO-ENTMCNC: 33.3 G/DL (ref 32–36)
MCV RBC AUTO: 99 FL (ref 82–98)
NUCLEATED RBC (/100WBC) (SMH): 0 /100 WBC
PLATELET # BLD AUTO: 192 K/UL (ref 150–450)
PMV BLD AUTO: 11 FL (ref 9.2–12.9)
POTASSIUM SERPL-SCNC: 4.2 MMOL/L (ref 3.5–5.1)
PROT SERPL-MCNC: 6.6 GM/DL (ref 6–8.4)
RBC # BLD AUTO: 4.62 M/UL (ref 4.6–6.2)
RELATIVE EOSINOPHIL (SMH): 3.3 % (ref 0–8)
RELATIVE LYMPHOCYTE (SMH): 17.8 % (ref 18–48)
RELATIVE MONOCYTE (SMH): 9.7 % (ref 4–15)
RELATIVE NEUTROPHIL (SMH): 68.3 % (ref 38–73)
SODIUM SERPL-SCNC: 137 MMOL/L (ref 136–145)
WBC # BLD AUTO: 9.03 K/UL (ref 3.9–12.7)

## 2025-04-01 PROCEDURE — 25000003 PHARM REV CODE 250: Performed by: INTERNAL MEDICINE

## 2025-04-01 PROCEDURE — 36415 COLL VENOUS BLD VENIPUNCTURE: CPT | Performed by: INTERNAL MEDICINE

## 2025-04-01 PROCEDURE — 63600175 PHARM REV CODE 636 W HCPCS: Mod: JZ | Performed by: INTERNAL MEDICINE

## 2025-04-01 PROCEDURE — 25000003 PHARM REV CODE 250: Performed by: FAMILY MEDICINE

## 2025-04-01 PROCEDURE — 83735 ASSAY OF MAGNESIUM: CPT | Performed by: INTERNAL MEDICINE

## 2025-04-01 PROCEDURE — 85025 COMPLETE CBC W/AUTO DIFF WBC: CPT | Performed by: INTERNAL MEDICINE

## 2025-04-01 PROCEDURE — 25000003 PHARM REV CODE 250: Performed by: STUDENT IN AN ORGANIZED HEALTH CARE EDUCATION/TRAINING PROGRAM

## 2025-04-01 PROCEDURE — 25000003 PHARM REV CODE 250

## 2025-04-01 PROCEDURE — 99406 BEHAV CHNG SMOKING 3-10 MIN: CPT | Performed by: CLINIC/CENTER

## 2025-04-01 PROCEDURE — 80053 COMPREHEN METABOLIC PANEL: CPT | Performed by: INTERNAL MEDICINE

## 2025-04-01 RX ORDER — LISINOPRIL 10 MG/1
10 TABLET ORAL DAILY
Qty: 90 TABLET | Refills: 0 | Status: SHIPPED | OUTPATIENT
Start: 2025-04-01 | End: 2025-04-02 | Stop reason: SDUPTHER

## 2025-04-01 RX ORDER — OXYCODONE AND ACETAMINOPHEN 10; 325 MG/1; MG/1
1 TABLET ORAL EVERY 4 HOURS PRN
Refills: 0 | Status: DISCONTINUED | OUTPATIENT
Start: 2025-04-01 | End: 2025-04-01 | Stop reason: HOSPADM

## 2025-04-01 RX ORDER — METOPROLOL TARTRATE 25 MG/1
25 TABLET, FILM COATED ORAL 2 TIMES DAILY
Qty: 180 TABLET | Refills: 0 | Status: SHIPPED | OUTPATIENT
Start: 2025-04-01 | End: 2025-06-30

## 2025-04-01 RX ORDER — OXYCODONE AND ACETAMINOPHEN 10; 325 MG/1; MG/1
1 TABLET ORAL EVERY 6 HOURS PRN
Qty: 20 TABLET | Refills: 0 | Status: SHIPPED | OUTPATIENT
Start: 2025-04-01

## 2025-04-01 RX ORDER — DOCUSATE SODIUM 100 MG/1
100 CAPSULE, LIQUID FILLED ORAL 2 TIMES DAILY
Qty: 14 CAPSULE | Refills: 0 | Status: SHIPPED | OUTPATIENT
Start: 2025-04-01 | End: 2025-04-08

## 2025-04-01 RX ORDER — POLYETHYLENE GLYCOL 3350 17 G/17G
17 POWDER, FOR SOLUTION ORAL DAILY
Qty: 30 PACKET | Refills: 0 | Status: SHIPPED | OUTPATIENT
Start: 2025-04-02

## 2025-04-01 RX ORDER — CIPROFLOXACIN 500 MG/1
500 TABLET ORAL EVERY 12 HOURS
Qty: 10 TABLET | Refills: 0 | Status: SHIPPED | OUTPATIENT
Start: 2025-04-01 | End: 2025-04-06

## 2025-04-01 RX ORDER — AMLODIPINE BESYLATE 5 MG/1
5 TABLET ORAL DAILY
Qty: 90 TABLET | Refills: 0 | Status: SHIPPED | OUTPATIENT
Start: 2025-04-01 | End: 2025-04-02 | Stop reason: SDUPTHER

## 2025-04-01 RX ORDER — OXYCODONE AND ACETAMINOPHEN 5; 325 MG/1; MG/1
1 TABLET ORAL EVERY 4 HOURS PRN
Refills: 0 | Status: DISCONTINUED | OUTPATIENT
Start: 2025-04-01 | End: 2025-04-01 | Stop reason: HOSPADM

## 2025-04-01 RX ADMIN — HYDROMORPHONE HYDROCHLORIDE 0.5 MG: 1 INJECTION, SOLUTION INTRAMUSCULAR; INTRAVENOUS; SUBCUTANEOUS at 07:04

## 2025-04-01 RX ADMIN — OXYCODONE HYDROCHLORIDE AND ACETAMINOPHEN 1 TABLET: 10; 325 TABLET ORAL at 10:04

## 2025-04-01 RX ADMIN — POLYETHYLENE GLYCOL 3350 17 G: 17 POWDER, FOR SOLUTION ORAL at 08:04

## 2025-04-01 RX ADMIN — LISINOPRIL 10 MG: 5 TABLET ORAL at 08:04

## 2025-04-01 RX ADMIN — SENNOSIDES, DOCUSATE SODIUM 1 TABLET: 50; 8.6 TABLET, FILM COATED ORAL at 04:04

## 2025-04-01 RX ADMIN — CEFTRIAXONE 1 G: 1 INJECTION, POWDER, FOR SOLUTION INTRAMUSCULAR; INTRAVENOUS at 02:04

## 2025-04-01 RX ADMIN — MUPIROCIN 1 G: 20 OINTMENT TOPICAL at 08:04

## 2025-04-01 RX ADMIN — DOCUSATE SODIUM 100 MG: 100 CAPSULE, LIQUID FILLED ORAL at 08:04

## 2025-04-01 RX ADMIN — METOPROLOL TARTRATE 25 MG: 25 TABLET, FILM COATED ORAL at 08:04

## 2025-04-01 RX ADMIN — AMLODIPINE BESYLATE 10 MG: 5 TABLET ORAL at 08:04

## 2025-04-01 RX ADMIN — FAMOTIDINE 20 MG: 20 TABLET, FILM COATED ORAL at 08:04

## 2025-04-01 RX ADMIN — HYDROCODONE BITARTRATE AND ACETAMINOPHEN 1 TABLET: 5; 325 TABLET ORAL at 04:04

## 2025-04-01 RX ADMIN — ASPIRIN 81 MG: 81 TABLET, CHEWABLE ORAL at 08:04

## 2025-04-01 NOTE — PLAN OF CARE
Patient cleared for discharge from case management standpoint.    Follow up information added to AVS.    Chart and discharge orders reviewed.  Patient discharged home with [facility]  (SOC 4/2) & no further case management needs.      04/01/25 1341   Final Note   Assessment Type Final Discharge Note   Anticipated Discharge Disposition [facility] Resources/Appts/Education Provided Provided patient/caregiver with written discharge plan information;Provided education on problems/symptoms using teachback   Post-Acute Status   Post-Acute Authorization Home [facility]   Home Health Status Set-up Complete/Auth obtained   Patient choice form signed by patient/caregiver List with quality metrics by geographic area provided   Discharge Delays None known at this time

## 2025-04-01 NOTE — NURSING
Discharge instructions reviewed with and given to patient. Verbalized understanding. IV removed without difficulty, catheter intact. Awaiting taxi transportation.

## 2025-04-01 NOTE — PLAN OF CARE
Met with patient to review discharge recommendation of home health and they are agreeable to plan.    Patient/family provided with a list of facilities in-network with patient's payor plan. Providers that are owned, operated, or affiliated with [facility] are included on the list.     Notified that referrals will be sent to the below listed facilities from in-network list based on proximity to home/family support:    No preference  2.  3.  4.  5. (can send more than 5)    Patient/family instructed to identify preference.    Preferred Facility: (if more than 1, listed in order of descending preference)   No preference     If an additional preferred facility not listed above is identified, additional referral to be sent. If above facilities unable to accept, will send additional referrals to in-network providers.     no suicidal ideation/no depression

## 2025-04-01 NOTE — NURSING
Discharged off unit in stable condition to [facility] with personal belongings.

## 2025-04-01 NOTE — PLAN OF CARE
[facility]  Discharge Reassessment    Primary Care Provider: Jalyn, Primary Doctor    Expected Discharge Date: 4/1/2025    Referrals placed for NP @ home program and outpatient case management.    SW informed by RN-CM that pt was met at bedside to review discharge recommendation of home health and they are agreeable to plan.    Patient/family provided with a list of facilities in-network with patient's payor plan. Providers that are owned, operated, or affiliated with [facility] are included on the list.     Notified that referrals will be sent to the below listed facilities from in-network list based on proximity to home/family support:   [facility]    Patient/family instructed to identify preference.    Preferred Facility: (if more than 1, listed in order of descending preference)  No preference    If an additional preferred facility not listed above is identified, additional referral to be sent. If above facilities unable to accept, will send additional referrals to in-network providers.      Reassessment (most recent)       Discharge Reassessment - 04/01/25 1209          Discharge Reassessment    Assessment Type Discharge Planning Reassessment     Did the patient's condition or plan change since previous assessment? --   dc disposition changed    Discharge Plan discussed with: Patient     Communicated CLARKE with patient/caregiver Yes     Discharge Plan A Home Health     DME Needed Upon Discharge  walker, rolling     Transition of Care Barriers Transportation        Post-Acute Status    Post-Acute Authorization Home Health     Home Health Status Referrals Sent     Discharge Delays None known at this time

## 2025-04-01 NOTE — ASSESSMENT & PLAN NOTE
Patient's blood pressure range in the last 24 hours was: BP  Min: 109/65  Max: 129/79.The patient's inpatient anti-hypertensive regimen is listed below:  Current Antihypertensives  lisinopriL tablet 10 mg, Daily, Oral  metoprolol tartrate (LOPRESSOR) tablet 25 mg, 2 times daily, Oral  nitroGLYCERIN SL tablet 0.4 mg, Every 5 min PRN, Sublingual  hydrALAZINE injection 10 mg, Every 4 hours PRN, Intravenous  amLODIPine tablet 10 mg, Daily, Oral

## 2025-04-01 NOTE — DISCHARGE SUMMARY
[facility]  Discharge Summary      Patient Name: Stuart Adler  MRN: [id number]  JOSE ANGEL: [id number]  Patient Class: IP- Inpatient  Admission Date: 3/27/2025  Hospital Length of Stay: 4 days  Discharge Date and Time: 4/1/2025  3:58 PM  Attending Physician: Jalyn att. providers found   Discharging Provider: Beni Ferro MD  Primary Care Provider: Jalyn, Primary Doctor    Primary Care Team: Networked reference to record PCT     HPI:   64 year old pt getting admitted with Ureteral colic  Pt noticed that he was unable to urinate starting this AM   Also had pain on L abdominal/Flank area  Pain was cramp& constant with radiation of pain to back  Pt came to ER and got admitted   Imaging showed Oval distal left ureteral stone measuring 8 x 5 x 4 mm, with minimal upstream hydroureteronephrosis.     Procedure(s) (LRB):  CYSTOSCOPY, WITH URETERAL STENT INSERTION (Right)      Hospital Course:   64-year-old male admitted with ureteral colic, imaging showed left ureteral stone with upstream hydronephrosis.  He was admitted and was started on IV antibiotics.  Urology was consulted patient underwent bilateral stent placements.  Urine culture closely followed and no growth till date. Patient continued to have hematuria, he mentioned he was not taking Plavix at home, discontinued it (stent in 2022), patient also complained significant cramping during micturition and debris like discharge in the urine, repeat UA ordered showed few bacteria, retroperitoneal ultrasound repeated does not show any hydronephrosis.  Antibiotics continued, patient instructed to follow up with Urology outpatient.  Pain medications adjusted with improvement in symptoms.    Physical Exam  Constitutional:       General: He is not in acute distress.  Cardiovascular:      Rate and Rhythm: Normal rate and regular rhythm.      Pulses: Normal pulses.  Pulmonary:      Effort: Pulmonary effort is normal. No respiratory distress.      Breath  sounds: Normal breath sounds.      Goals of Care Treatment Preferences:  Code Status: Full Code      SDOH Screening:  The patient was screened for utility difficulties, food insecurity, transport difficulties, housing insecurity, and interpersonal safety and there were no concerns identified this admission.     Consults:   Consults (From admission, onward)          Status Ordering Provider     Inpatient consult to Wound Care Physician  Once        Provider:  Kings Lutz DO    Completed JUDY CHADWICK     Inpatient consult to Urology  Once        Provider:  Kaleb Gaffney MD    Completed ARPITA KIDD            Assessment & Plan  Left ureteral stone  Urology consulted  Status post bilateral stent placement  IV antibiotics  Repeat urine culture - follow up final report  Complained of cramping with micturition, likely postprocedure --> reconsulted Urology on Monday when coverage is available  Repeat retroperitoneal ultrasound does not show any hydronephrosis  Pain improves with pain regimen      Facial basal cell cancer  S/p surgery  Has new lesions on nose, requires outpatient follow up    Uncontrolled hypertension  Patient's blood pressure range in the last 24 hours was: BP  Min: 109/65  Max: 129/79.The patient's inpatient anti-hypertensive regimen is listed below:  Current Antihypertensives  lisinopriL tablet 10 mg, Daily, Oral  metoprolol tartrate (LOPRESSOR) tablet 25 mg, 2 times daily, Oral  nitroGLYCERIN SL tablet 0.4 mg, Every 5 min PRN, Sublingual  hydrALAZINE injection 10 mg, Every 4 hours PRN, Intravenous  amLODIPine tablet 10 mg, Daily, Oral      Constipation  -patient states he is passing gas.  No abdominal tenderness to palpation  -repeat KUB on 03/31/2025 = pending   -added Colace 100 mg p.o. b.i.d.  -added MiraLax 17 g p.o. daily  -cover with the lactulose PO TID prn  -if no relief with oral laxatives and stool softeners may require an enema    Final Active Diagnoses:    Diagnosis Date  "Noted POA    PRINCIPAL PROBLEM:  Left ureteral stone [N20.1] 03/27/2025 Yes    Constipation [K59.00] 03/31/2025 Yes    Uncontrolled hypertension [I10] 04/09/2023 Yes    Facial basal cell cancer [C44.310] 01/26/2021 Yes     Chronic      Problems Resolved During this Admission:       Discharged Condition: stable    [place] [facility] [place] Up:   Contact information for follow-up providers       Kaleb Gaffney MD Follow up in 3 day(s).    Specialty: Urology  Contact information:  [address]  [address]  [place] [address]  [phone]                       Contact information for after-discharge care       Home Medical Care       [facility] .    Service: Home Health Services  Contact information:  [address] [address]  [phone]                                 Patient Instructions:      WALKER FOR HOME USE     Order Specific Question Answer Comments   Type of Walker: Adult (5'4"-6'6")    With wheels? Yes    Height: 6' 2" (1.88 m)    Weight: 132.8 kg (292 lb 12.3 oz)    Length of need (1-99 months): 99    Does patient have medical equipment at home? none    Please check all that apply: Patient's condition impairs ambulation.    Please check all that apply: Patient is unable to safely ambulate without equipment.      Ambulatory referral/consult to [facility]   Standing Status: Future   Referral Priority: Routine Referral Type: Consultation   Referral Reason: Specialty Services Required   Number of Visits Requested: 1     Ambulatory referral/consult to Outpatient Case Management   Referral Priority: Routine Referral Type: Consultation   Referral Reason: Specialty Services Required   Number of Visits Requested: 1     Diet Adult Regular     HOME HEALTH ORDERS   Order Comments: PT eval and treat  OT eval and treat   Skilled nursing for disease management     Order Specific Question Answer Comments   What Home Health Agency is the " patient currently using? Other/External      Notify your health care provider if you experience any of the following:  temperature >100.4     Notify your health care provider if you experience any of the following:  persistent nausea and vomiting or diarrhea     Notify your health care provider if you experience any of the following:  severe uncontrolled pain     Notify your health care provider if you experience any of the following:  redness, tenderness, or signs of infection (pain, swelling, redness, odor or green/yellow discharge around incision site)     Activity as tolerated       Significant Diagnostic Studies: Labs: CMP   Recent Labs   Lab 03/31/25 0440 04/01/25  0516    137   K 4.1 4.2   CO2 28 26   BUN 23 23   CREATININE 0.9 1.0   CALCIUM 8.8 8.7   ALBUMIN 3.5 3.7   BILITOT 0.5 0.5   ALKPHOS 63 62   AST 13 12   ALT 14 13    and CBC   Recent Labs   Lab 03/31/25 0440 04/01/25  0516   WBC 8.65 9.03   HGB 14.9 15.2   HCT 45.4 45.7    192       Pending Diagnostic Studies:       Procedure Component Value Units Date/Time    EXTRA TUBES [[id number]] Collected: 03/27/25 0736    Order Status: Sent Lab Status: In process Updated: 03/27/25 0741    Specimen: Blood, Venous     Narrative:      The following orders were created for panel order EXTRA TUBES.  Procedure                               Abnormality         Status                     ---------                               -----------         ------                     Light Blue Top Hold[[id number]]                             In process                 Lavender Top Hold[[id number]]                               In process                   Please view results for these tests on the individual orders.           Medications:  Reconciled Home Medications:      Medication List        START taking these medications      ciprofloxacin HCl 500 MG tablet  Commonly known as: CIPRO  Take 1 tablet (500 mg total) by mouth every 12 (twelve) hours. for 5  days     docusate sodium 100 MG capsule  Commonly known as: COLACE  Take 1 capsule (100 mg total) by mouth 2 (two) times daily. for 7 days     oxyCODONE-acetaminophen  mg per tablet  Commonly known as: PERCOCET  Take 1 tablet by mouth every 6 (six) hours as needed for Pain.     polyethylene glycol 17 gram Pwpk  Commonly known as: GLYCOLAX  Take 17 g by mouth once daily.  Start taking on: April 2, 2025            CONTINUE taking these medications      amLODIPine 5 MG tablet  Commonly known as: NORVASC  Take 1 tablet (5 mg total) by mouth once daily.     atorvastatin 80 MG tablet  Commonly known as: LIPITOR  Take 1 tablet (80 mg total) by mouth once daily.     lisinopriL 10 MG tablet  Take 1 tablet (10 mg total) by mouth once daily.     metoprolol tartrate 25 MG tablet  Commonly known as: LOPRESSOR  Take 1 tablet (25 mg total) by mouth 2 (two) times daily.     nitroGLYCERIN 0.4 MG SL tablet  Commonly known as: NITROSTAT  Place 1 tablet (0.4 mg total) under the tongue every 5 (five) minutes as needed for Chest pain.            STOP taking these medications      clopidogreL 75 mg tablet  Commonly known as: PLAVIX            ASK your doctor about these medications      aspirin 81 MG Chew  CHEW AND SWALLOW 1 TABLET(81 MG) BY MOUTH EVERY DAY              Indwelling Lines/Drains at time of discharge:   Lines/Drains/Airways       None                   Time spent on the discharge of patient: 37 minutes         Beni Ferro MD  Department of Hospital Medicine  [facility]

## 2025-04-01 NOTE — PROGRESS NOTES
04/01/25 0800   Tobacco Cessation Intervention   Do you use any type of tobacco product? Yes   Are you interested in quitting use of tobacco products? Thinking about quitting   Are you interested in Nicotine Replacement for symptom relief? No   $ Smoking Cessation Charges Smoking Cessation - Intermediate (CTTS)

## 2025-04-01 NOTE — PLAN OF CARE
Pt accepted with [facility].  SOC 4/2. SW to send orders when available.     04/01/25 1303   Post-Acute Status   Post-Acute Authorization Home Health   Home Health Status Pending post-acute provider review/more information requested

## 2025-04-02 ENCOUNTER — OFFICE VISIT (OUTPATIENT)
Dept: HOME HEALTH SERVICES | Facility: CLINIC | Age: 65
End: 2025-04-02
Payer: MEDICARE

## 2025-04-02 ENCOUNTER — PATIENT OUTREACH (OUTPATIENT)
Dept: ADMINISTRATIVE | Facility: CLINIC | Age: 65
End: 2025-04-02
Payer: MEDICARE

## 2025-04-02 VITALS
HEIGHT: 74 IN | DIASTOLIC BLOOD PRESSURE: 64 MMHG | OXYGEN SATURATION: 98 % | TEMPERATURE: 99 F | RESPIRATION RATE: 20 BRPM | BODY MASS INDEX: 37.35 KG/M2 | WEIGHT: 291 LBS | HEART RATE: 75 BPM | SYSTOLIC BLOOD PRESSURE: 144 MMHG

## 2025-04-02 DIAGNOSIS — I25.10 CORONARY ARTERY DISEASE INVOLVING NATIVE CORONARY ARTERY OF NATIVE HEART WITHOUT ANGINA PECTORIS: Chronic | ICD-10-CM

## 2025-04-02 DIAGNOSIS — N20.1 LEFT URETERAL CALCULUS: ICD-10-CM

## 2025-04-02 DIAGNOSIS — R07.89 OTHER CHEST PAIN: ICD-10-CM

## 2025-04-02 DIAGNOSIS — E66.01 SEVERE OBESITY (BMI 35.0-39.9) WITH COMORBIDITY: Primary | ICD-10-CM

## 2025-04-02 DIAGNOSIS — I10 UNCONTROLLED HYPERTENSION: ICD-10-CM

## 2025-04-02 PROBLEM — M87.30 OSTEORADIONECROSIS: Status: ACTIVE | Noted: 2025-04-02
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
## 2025-04-02 RX ORDER — LISINOPRIL 10 MG/1
10 TABLET ORAL DAILY
Qty: 90 TABLET | Refills: 0 | Status: SHIPPED | OUTPATIENT
Start: 2025-04-02 | End: 2025-07-01

## 2025-04-02 RX ORDER — NITROGLYCERIN 0.4 MG/1
0.4 TABLET SUBLINGUAL EVERY 5 MIN PRN
Qty: 30 TABLET | Refills: 1 | Status: SHIPPED | OUTPATIENT
Start: 2025-04-02

## 2025-04-02 RX ORDER — AMLODIPINE BESYLATE 5 MG/1
5 TABLET ORAL DAILY
Qty: 90 TABLET | Refills: 0 | Status: SHIPPED | OUTPATIENT
Start: 2025-04-02 | End: 2025-07-01

## 2025-04-02 RX ORDER — ATORVASTATIN CALCIUM 80 MG/1
80 TABLET, FILM COATED ORAL DAILY
Qty: 90 TABLET | Refills: 0 | Status: SHIPPED | OUTPATIENT
Start: 2025-04-02 | End: 2025-07-01

## 2025-04-02 NOTE — PROGRESS NOTES
C3 nurse spoke with Stuart Adler  for a TCC post hospital discharge follow up call. The patient has a scheduled HOSFU appointment with JULIEN Soler ( home NP)  on 04/02/2025 @ NP set up arrival time 4.

## 2025-04-03 NOTE — ASSESSMENT & PLAN NOTE
Pt stable no distress, states urine has cleared no blood seen  No pain at this time, states when trying to strain for BM he can feel pressure in back but no pain at this time,   Waiting urology follow up, one was not placed on dc from hospital, working on setting that up now,     Pt has pain meds prn, not need at this time,   Taking Cipro as directed     Continue follow up, meds and home health/pt ot as directed.

## 2025-04-03 NOTE — PROGRESS NOTES
[facility]  Transitional Care Management (TCM) Home Visit    Encounter Provider: Rick Soler   PCP: No, Primary Doctor  Consult Requested By: Dr. Nita Traore*  Admit Date: 3/27/25   IP Discharge Date: 4/1/25  Hospital Length of Stay:RRHLOS@ days  Days since discharge (from IP or SNF): 1   [facility] Contact Note: yes datev 4/1/25  Hospital Diagnosis: Left ureteral calculus [N20.1]     HISTORY OF PRESENT ILLNESS      Patient ID: Stuart Adler is a 64 y.o. male was recently admitted to the hospital, this is their TCM encounter.    Hospital Course Synopsis:    64-year-old male admitted with ureteral colic, imaging showed left ureteral stone with upstream hydronephrosis. He was admitted and was started on IV antibiotics. Urology was consulted patient underwent bilateral stent placements. Urine culture closely followed and no growth till date. Patient continued to have hematuria, he mentioned he was not taking Plavix at home, discontinued it (stent in 2022), patient also complained significant cramping during micturition and debris like discharge in the urine, repeat UA ordered showed few bacteria, retroperitoneal ultrasound repeated does not show any hydronephrosis. Antibiotics continued, patient instructed to follow up with Urology outpatient.     Pt in no distress states pain is under control at this time, but has not had bowel movement, spoke about his usage of mirlax, and stool softener,     Also, referral was not set up for pt on dc with urology, working on that now to get patient in,     Pt also out of chronic meds, will refill as directed and will continue meds per pmd.     DECISION MAKING TODAY       Assessment & Plan:  1. Severe obesity (BMI 35.0-39.9) with comorbidity  Assessment & Plan:  Chronic condition,   Pt states issues with mobility and issues with cancer treatments,    Will attempt diet and light walking exercise,   Continue to monitor      2. Left ureteral calculus  Assessment &  Plan:  Pt stable no distress, states urine has cleared no blood seen  No pain at this time, states when trying to strain for BM he can feel pressure in back but no pain at this time,   Waiting urology follow up, one was not placed on dc from hospital, working on setting that up now,     Pt has pain meds prn, not need at this time,   Taking Cipro as directed     Continue follow up, meds and home health/pt ot as directed.       Orders:  -     Ambulatory referral/consult to [facility]    3. Uncontrolled hypertension  -     amLODIPine (NORVASC) 5 MG tablet; Take 1 tablet (5 mg total) by mouth once daily.  Dispense: 90 tablet; Refill: 0  -     lisinopriL 10 MG tablet; Take 1 tablet (10 mg total) by mouth once daily.  Dispense: 90 tablet; Refill: 0    4. Coronary artery disease involving native coronary artery of native heart without angina pectoris  -     atorvastatin (LIPITOR) 80 MG tablet; Take 1 tablet (80 mg total) by mouth once daily.  Dispense: 90 tablet; Refill: 0    5. Other chest pain  -     nitroGLYCERIN (NITROSTAT) 0.4 MG SL tablet; Place 1 tablet (0.4 mg total) under the tongue every 5 (five) minutes as needed for Chest pain.  Dispense: 30 tablet; Refill: 1         Medication List on Discharge:     Medication List            Accurate as of April 2, 2025  9:14 PM. If you have any questions, ask your nurse or doctor.                CONTINUE taking these medications      amLODIPine 5 MG tablet  Commonly known as: NORVASC  Take 1 tablet (5 mg total) by mouth once daily.     aspirin 81 MG Chew  CHEW AND SWALLOW 1 TABLET(81 MG) BY MOUTH EVERY DAY     atorvastatin 80 MG tablet  Commonly known as: LIPITOR  Take 1 tablet (80 mg total) by mouth once daily.     ciprofloxacin HCl 500 MG tablet  Commonly known as: CIPRO  Take 1 tablet (500 mg total) by mouth every 12 (twelve) hours. for 5 days     docusate sodium 100 MG capsule  Commonly known as: COLACE  Take 1 capsule (100 mg total) by mouth 2 (two)  times daily. for 7 days     lisinopriL 10 MG tablet  Take 1 tablet (10 mg total) by mouth once daily.     metoprolol tartrate 25 MG tablet  Commonly known as: LOPRESSOR  Take 1 tablet (25 mg total) by mouth 2 (two) times daily.     nitroGLYCERIN 0.4 MG SL tablet  Commonly known as: NITROSTAT  Place 1 tablet (0.4 mg total) under the tongue every 5 (five) minutes as needed for Chest pain.     oxyCODONE-acetaminophen  mg per tablet  Commonly known as: PERCOCET  Take 1 tablet by mouth every 6 (six) hours as needed for Pain.     polyethylene glycol 17 gram Pwpk  Commonly known as: GLYCOLAX  Take 17 g by mouth once daily.              Medication Reconciliation:  Were medications changed on discharge? Yes  Were medications in the home? Yes  Is the patient taking the medications as directed? Yes  Does the patient understand the medications and changes? Yes  Does updated med list accurately reflects meds patient is currently taking? Yes    ENVIRONMENT OF CARE      Family and/or Caregiver present at visit?  No  Name of Caregiver:   History provided by: patient    Advance Care Planning   Advanced Care Planning Status:  Patient has had an ACP conversation  Living Will: No  Power of : No  [place]: No    Does Caregiver have HCPoA: no  Changes today: no  Is patient hospice appropriate: No  (If needed, use PPS <30 or FAST score >7)  Was referral to hospice placed: No       Impression upon entering the home:  Physical Dwelling: apartment/condo   Appearance of home environment: cleaniness: dirty, walking pathways: cluttered, lighting: inadequate, and home structure: sound structure  Functional Status: independent  Mobility: ambulatory  Nutritional access: adequate intake and access  Home Health: yes, [facility]    DME/Supplies: none     Diagnostic tests reviewed/disposition: No diagnosic tests pending after this hospitalization.  Disease/illness education: Cancer  Establishment or re-establishment of referral orders for  community resources: No other necessary community resources.   Discussion with other health care providers: No discussion with other health care providers necessary.   Does patient have a PCP at [facility]? Yes   Repatriation plan with PCP? follow-up with PCP within 30d   Does patient have an ostomy (ileostomy, colostomy, suprapubic catheter, nephrostomy tube, tracheostomy, PEG tube, pleurex catheter, cholecystostomy, etc)? No  Were BPAs reviewed? Yes    Social History     Socioeconomic History    Marital status: Single   Tobacco Use    Smoking status: Every Day     Current packs/day: 0.75     Average packs/day: 0.8 packs/day for 39.2 years (29.4 ttl pk-yrs)     Types: Cigarettes     Start date: 1986   Substance and Sexual Activity    Alcohol use: Yes     Comment: 1/4 bottle whiskey every day    Drug use: Yes     Types: Marijuana     Social Drivers of Health     Financial Resource Strain: Low Risk  (3/29/2025)    Overall Financial Resource Strain (CARDIA)     Difficulty of Paying Living Expenses: Not very hard   Recent Concern: Financial Resource Strain - High Risk (3/28/2025)    Overall Financial Resource Strain (CARDIA)     Difficulty of Paying Living Expenses: Hard   Food Insecurity: No Food Insecurity (3/29/2025)    Hunger Vital Sign     Worried About Running Out of Food in the Last Year: Never true     Ran Out of Food in the Last Year: Never true   Recent Concern: Food Insecurity - Food Insecurity Present (3/28/2025)    Hunger Vital Sign     Worried About Running Out of Food in the Last Year: Sometimes true     Ran Out of Food in the Last Year: Patient unable to answer   Transportation Needs: No Transportation Needs (3/29/2025)    PRAPARE - Transportation     Lack of Transportation (Medical): No     Lack of Transportation (Non-Medical): No   Physical Activity: Inactive (4/10/2023)    Exercise Vital Sign     Days of Exercise per Week: 0 days     Minutes of Exercise per Session: 0 min   Stress: No Stress Concern Present  (3/29/2025)    [employer] - Occupational Stress Questionnaire     Feeling of Stress : Only a little   Housing Stability: Low Risk  (3/29/2025)    Housing Stability Vital Sign     Unable to Pay for Housing in the Last Year: No     Homeless in the Last Year: No       OBJECTIVE:     Vital Signs:  Vitals:    04/02/25 2047   BP: (!) 144/64   Pulse: 75   Resp: 20   Temp: 98.6 °F (37 °C)       Review of Systems   Constitutional:  Negative for activity change, chills and diaphoresis.   HENT:  Negative for congestion, nosebleeds, tinnitus and trouble swallowing.    Eyes:  Negative for pain, discharge and visual disturbance.   Respiratory:  Negative for chest tightness, shortness of breath and wheezing.    Cardiovascular:  Negative for chest pain and palpitations.   Gastrointestinal:  Negative for abdominal distention, abdominal pain, blood in stool, constipation, diarrhea and vomiting.   Endocrine: Negative for cold intolerance and heat intolerance.   Genitourinary:  Negative for difficulty urinating, penile discharge, scrotal swelling and testicular pain.   Musculoskeletal:  Negative for myalgias, neck pain and neck stiffness.   Skin:  Negative for color change and pallor.   Allergic/Immunologic: Negative for immunocompromised state.   Neurological:  Negative for dizziness.   Psychiatric/Behavioral:  Negative for agitation, confusion, hallucinations, self-injury and suicidal ideas.        Physical Exam:  Physical Exam  Vitals and nursing note reviewed.   Constitutional:       Appearance: Normal appearance. He is obese.   HENT:      Head: Normocephalic and atraumatic.      Nose: Nose normal.   Eyes:      Extraocular Movements: Extraocular movements intact.      Pupils: Pupils are equal, round, and reactive to light.   Cardiovascular:      Rate and Rhythm: Normal rate.   Pulmonary:      Effort: Pulmonary effort is normal.      Breath sounds: Normal breath sounds.   Musculoskeletal:          General: Normal range of motion.      Cervical back: Normal range of motion.   Skin:     General: Skin is warm and dry.   Neurological:      General: No focal deficit present.      Mental Status: He is alert and oriented to person, place, and time.   Psychiatric:         Mood and Affect: Mood normal.         Behavior: Behavior normal.         Thought Content: Thought content normal.         Judgment: Judgment normal.         INSTRUCTIONS FOR PATIENT:     Scheduled Follow-up, Appts Reviewed with Modifications if Needed: Yes  No future appointments.    Signature: TABBY Garcia    I spent a total of 40 minutes on the day of the visit.This includes face to face time and non-face to face time preparing to see the patient (eg, review of tests), obtaining and/or reviewing separately obtained history, documenting clinical information in the electronic or other health record, independently interpreting results and communicating results to the patient/family/caregiver, or care coordinator.    Transition of Care Visit:  I have reviewed and updated the history and problem list.  I have reconciled the medication list.  I have discussed the hospitalization and current medical issues, prognosis and plans with the patient/family.

## 2025-04-03 NOTE — ASSESSMENT & PLAN NOTE
Chronic condition,   Pt states issues with mobility and issues with cancer treatments,    Will attempt diet and light walking exercise,   Continue to monitor

## 2025-04-08 ENCOUNTER — PATIENT OUTREACH (OUTPATIENT)
Dept: ADMINISTRATIVE | Facility: OTHER | Age: 65
End: 2025-04-08
Payer: MEDICARE

## 2025-04-08 NOTE — PROGRESS NOTES
CHW - Outreach Attempt    Community Health Worker left a voicemail message for 1st attempt to contact patient regarding: bib smith  Community Health Worker to attempt to contact patient on:[phone]    Call dropped, returned call and went to voicemail

## 2025-04-08 NOTE — PROGRESS NOTES
"CHW - Initial Contact    This Community Health Worker completed OR updated the Social Determinant of Health questionnaire with patient via telephone today.    Pt identified barriers of most importance are: financial difficulty   Referrals to community agencies completed with patient/caregiver consent outside of [facility] include: [email or web address]  Referrals were put through [facility] - no:   Support and Services:     Patient stated he feels like he is is the "squeaking wheel that needs grease" in the last few years. He voiced his medical bills have exceeded his income and not sure how to pay them. He lives at home with a younger female who has 3 small children. He stated he lost his car through hospital stays a few years ago and was not able to keep up on the payments and the roommate lost her vehicle during the COVID-19 layoffs, but uses a taxi or insurance transportation when needed. He said they have cut the budget back as far as they already can at home. The patient does not receive any VA benefits but would like to; stated he has tried through phone calls but became frustrated and had not gotten anywhere so gave up. The patient voiced he has been years since he has seen a general MD and oncologist. He stated he has new growths on his face and should see someone local.    CHW provided patient with contacts for [employer], [employer], [facility]. CHW discussed getting a new PCP and oncologist appt scheduled. The patient voiced he wants to stay local as [facility] is to far to travel now as they have no transportation.     CHW will collaborate with new PCP for referrals and help schedule appts, while patient registers to use [employer]. CHW will contact [facility] tog et patient an appt to enroll in benefits. CHW also resent activation code for Mychart to patient as discussed.        Other information discussed the patient needs / wants help with: Needs PCP/ Oncologist/ VA services, food insecurity, " transportation   Follow up required:   Follow-up Outreach - Due: 4/11/2025

## 2025-04-09 ENCOUNTER — DOCUMENT SCAN (OUTPATIENT)
Dept: HOME HEALTH SERVICES | Facility: HOSPITAL | Age: 65
End: 2025-04-09
Payer: MEDICARE

## 2025-04-10 ENCOUNTER — HOSPITAL ENCOUNTER (OUTPATIENT)
Facility: HOSPITAL | Age: 65
Discharge: HOME | End: 2025-04-10
Attending: SPECIALIST | Admitting: SPECIALIST
Payer: MEDICARE

## 2025-04-10 ENCOUNTER — ANESTHESIA EVENT (OUTPATIENT)
Dept: SURGERY | Facility: HOSPITAL | Age: 65
End: 2025-04-10
Payer: MEDICARE

## 2025-04-10 ENCOUNTER — HOSPITAL ENCOUNTER (OUTPATIENT)
Dept: RADIOLOGY | Facility: HOSPITAL | Age: 65
Discharge: HOME | End: 2025-04-10
Attending: SPECIALIST
Payer: MEDICARE

## 2025-04-10 ENCOUNTER — ANESTHESIA (OUTPATIENT)
Dept: SURGERY | Facility: HOSPITAL | Age: 65
End: 2025-04-10
Payer: MEDICARE

## 2025-04-10 VITALS
BODY MASS INDEX: 35.43 KG/M2 | TEMPERATURE: 98 F | RESPIRATION RATE: 18 BRPM | WEIGHT: 285 LBS | HEIGHT: 75 IN | OXYGEN SATURATION: 98 % | SYSTOLIC BLOOD PRESSURE: 148 MMHG | DIASTOLIC BLOOD PRESSURE: 87 MMHG | HEART RATE: 60 BPM

## 2025-04-10 DIAGNOSIS — Z01.818 PRE-OP TESTING: ICD-10-CM

## 2025-04-10 DIAGNOSIS — G89.18 POST-OP PAIN: ICD-10-CM

## 2025-04-10 DIAGNOSIS — N20.1 LEFT URETERAL CALCULUS: Primary | ICD-10-CM

## 2025-04-10 DIAGNOSIS — Z79.01 MONITORING FOR ANTICOAGULANT USE: ICD-10-CM

## 2025-04-10 DIAGNOSIS — N20.0 KIDNEY STONE: ICD-10-CM

## 2025-04-10 DIAGNOSIS — Z51.81 MONITORING FOR ANTICOAGULANT USE: ICD-10-CM

## 2025-04-10 LAB
APTT PPP: 28.3 SECONDS (ref 21–32)
INR PPP: 1.1 (ref 0.8–1.2)
PROTHROMBIN TIME: 11.8 SECONDS (ref 9–12.5)

## 2025-04-10 PROCEDURE — 36000706: Performed by: SPECIALIST

## 2025-04-10 PROCEDURE — C1769 GUIDE WIRE: HCPCS | Performed by: SPECIALIST

## 2025-04-10 PROCEDURE — 71000039 HC RECOVERY, EACH ADD'L HOUR: Performed by: SPECIALIST

## 2025-04-10 PROCEDURE — 71000033 HC RECOVERY, INTIAL HOUR: Performed by: SPECIALIST

## 2025-04-10 PROCEDURE — 71000015 HC POSTOP RECOV 1ST HR: Performed by: SPECIALIST

## 2025-04-10 PROCEDURE — 27201423 OPTIME MED/SURG SUP & DEVICES STERILE SUPPLY: Performed by: SPECIALIST

## 2025-04-10 PROCEDURE — 85610 PROTHROMBIN TIME: CPT | Performed by: SPECIALIST

## 2025-04-10 PROCEDURE — 36000707: Performed by: SPECIALIST

## 2025-04-10 PROCEDURE — 25000003 PHARM REV CODE 250: Performed by: SPECIALIST

## 2025-04-10 PROCEDURE — 37000009 HC ANESTHESIA EA ADD 15 MINS: Performed by: SPECIALIST

## 2025-04-10 PROCEDURE — 63600175 PHARM REV CODE 636 W HCPCS: Performed by: ANESTHESIOLOGY

## 2025-04-10 PROCEDURE — 37000008 HC ANESTHESIA 1ST 15 MINUTES: Performed by: SPECIALIST

## 2025-04-10 PROCEDURE — C1758 CATHETER, URETERAL: HCPCS | Performed by: SPECIALIST

## 2025-04-10 PROCEDURE — 82365 CALCULUS SPECTROSCOPY: CPT | Performed by: SPECIALIST

## 2025-04-10 PROCEDURE — 25000003 PHARM REV CODE 250: Performed by: ANESTHESIOLOGY

## 2025-04-10 PROCEDURE — 63600175 PHARM REV CODE 636 W HCPCS: Performed by: NURSE ANESTHETIST, CERTIFIED REGISTERED

## 2025-04-10 PROCEDURE — 85730 THROMBOPLASTIN TIME PARTIAL: CPT | Performed by: SPECIALIST

## 2025-04-10 PROCEDURE — 76000 FLUOROSCOPY <1 HR PHYS/QHP: CPT | Mod: TC

## 2025-04-10 RX ORDER — FENTANYL CITRATE 50 UG/ML
INJECTION, SOLUTION INTRAMUSCULAR; INTRAVENOUS
Status: DISCONTINUED | OUTPATIENT
Start: 2025-04-10 | End: 2025-04-10

## 2025-04-10 RX ORDER — HYDROCODONE BITARTRATE AND ACETAMINOPHEN 5; 325 MG/1; MG/1
1 TABLET ORAL EVERY 6 HOURS PRN
Qty: 12 TABLET | Refills: 0
Start: 2025-04-10

## 2025-04-10 RX ORDER — GLUCAGON 1 MG
1 KIT INJECTION
Status: DISCONTINUED | OUTPATIENT
Start: 2025-04-10 | End: 2025-04-10 | Stop reason: HOSPADM

## 2025-04-10 RX ORDER — ACETAMINOPHEN 10 MG/ML
INJECTION, SOLUTION INTRAVENOUS
Status: DISCONTINUED | OUTPATIENT
Start: 2025-04-10 | End: 2025-04-10

## 2025-04-10 RX ORDER — ONDANSETRON HYDROCHLORIDE 2 MG/ML
4 INJECTION, SOLUTION INTRAVENOUS DAILY PRN
Status: DISCONTINUED | OUTPATIENT
Start: 2025-04-10 | End: 2025-04-10 | Stop reason: HOSPADM

## 2025-04-10 RX ORDER — OXYCODONE HYDROCHLORIDE 5 MG/1
5 TABLET ORAL
Refills: 0 | Status: DISCONTINUED | OUTPATIENT
Start: 2025-04-10 | End: 2025-04-10 | Stop reason: HOSPADM

## 2025-04-10 RX ORDER — FENTANYL CITRATE 50 UG/ML
25 INJECTION, SOLUTION INTRAMUSCULAR; INTRAVENOUS
Refills: 0 | Status: DISCONTINUED | OUTPATIENT
Start: 2025-04-10 | End: 2025-04-10 | Stop reason: HOSPADM

## 2025-04-10 RX ORDER — SODIUM CHLORIDE, SODIUM LACTATE, POTASSIUM CHLORIDE, CALCIUM CHLORIDE 600; 310; 30; 20 MG/100ML; MG/100ML; MG/100ML; MG/100ML
INJECTION, SOLUTION INTRAVENOUS CONTINUOUS PRN
Status: DISCONTINUED | OUTPATIENT
Start: 2025-04-10 | End: 2025-04-10

## 2025-04-10 RX ORDER — ONDANSETRON HYDROCHLORIDE 2 MG/ML
INJECTION, SOLUTION INTRAVENOUS
Status: DISCONTINUED | OUTPATIENT
Start: 2025-04-10 | End: 2025-04-10

## 2025-04-10 RX ORDER — DIPHENHYDRAMINE HYDROCHLORIDE 50 MG/ML
12.5 INJECTION, SOLUTION INTRAMUSCULAR; INTRAVENOUS
Status: DISCONTINUED | OUTPATIENT
Start: 2025-04-10 | End: 2025-04-10 | Stop reason: HOSPADM

## 2025-04-10 RX ORDER — LIDOCAINE HYDROCHLORIDE 20 MG/ML
JELLY TOPICAL
Status: DISCONTINUED | OUTPATIENT
Start: 2025-04-10 | End: 2025-04-10 | Stop reason: HOSPADM

## 2025-04-10 RX ORDER — SULFAMETHOXAZOLE AND TRIMETHOPRIM 800; 160 MG/1; MG/1
1 TABLET ORAL DAILY
Qty: 7 TABLET | Refills: 0
Start: 2025-04-10

## 2025-04-10 RX ORDER — PROPOFOL 10 MG/ML
VIAL (ML) INTRAVENOUS
Status: DISCONTINUED | OUTPATIENT
Start: 2025-04-10 | End: 2025-04-10

## 2025-04-10 RX ORDER — LIDOCAINE HYDROCHLORIDE 20 MG/ML
INJECTION, SOLUTION EPIDURAL; INFILTRATION; INTRACAUDAL; PERINEURAL
Status: DISCONTINUED | OUTPATIENT
Start: 2025-04-10 | End: 2025-04-10

## 2025-04-10 RX ORDER — FAMOTIDINE 10 MG/ML
INJECTION, SOLUTION INTRAVENOUS
Status: DISCONTINUED | OUTPATIENT
Start: 2025-04-10 | End: 2025-04-10

## 2025-04-10 RX ADMIN — ACETAMINOPHEN 1000 MG: 10 INJECTION, SOLUTION INTRAVENOUS at 12:04

## 2025-04-10 RX ADMIN — GLYCOPYRROLATE 0.4 MG: 0.2 INJECTION, SOLUTION INTRAMUSCULAR; INTRAVENOUS at 12:04

## 2025-04-10 RX ADMIN — FENTANYL CITRATE 25 MCG: 50 INJECTION INTRAMUSCULAR; INTRAVENOUS at 01:04

## 2025-04-10 RX ADMIN — SODIUM CHLORIDE, SODIUM LACTATE, POTASSIUM CHLORIDE, AND CALCIUM CHLORIDE: .6; .31; .03; .02 INJECTION, SOLUTION INTRAVENOUS at 11:04

## 2025-04-10 RX ADMIN — OXYCODONE HYDROCHLORIDE 5 MG: 5 TABLET ORAL at 01:04

## 2025-04-10 RX ADMIN — FAMOTIDINE 20 MG: 10 INJECTION, SOLUTION INTRAVENOUS at 12:04

## 2025-04-10 RX ADMIN — FENTANYL CITRATE 100 MCG: 50 INJECTION, SOLUTION INTRAMUSCULAR; INTRAVENOUS at 12:04

## 2025-04-10 RX ADMIN — LIDOCAINE HYDROCHLORIDE 50 MG: 20 INJECTION, SOLUTION INTRAVENOUS at 12:04

## 2025-04-10 RX ADMIN — ONDANSETRON 4 MG: 2 INJECTION INTRAMUSCULAR; INTRAVENOUS at 12:04

## 2025-04-10 RX ADMIN — ONDANSETRON 4 MG: 2 INJECTION INTRAMUSCULAR; INTRAVENOUS at 01:04

## 2025-04-10 RX ADMIN — PROPOFOL 150 MG: 10 INJECTION, EMULSION INTRAVENOUS at 12:04

## 2025-04-10 NOTE — OP NOTE
Operative Note         SUMMARY     Surgery Date:  4/10/2025     Assistant:     Indications:  64-year-old male with a left ureteral stone  7 x 8 mm  A double-J stent is in place      Pre-op Diagnosis:   Left ureteral stone    Post-op Diagnosis:  Same    Procedure:  Cystoscopy with left ureteroscopy  Holmium laser treatment of stone  And stone removal  Bilateral stent removal    Anesthesia: General    Description of Procedure:   Patient brought to cystoscopy suite.  Placed in the cystoscopy position.  Patient is prepped and draped.  Anesthesia is given.  We enter the urinary bladder with the 21 fr cystoscope.  Once inside the bladder we encounter a stent that had also been placed in the right ureter  It is removed  We then removed the stent from the left ureter with a grasper  We placed a Glidewire easily  We then float a rigid ureteral scope into the distal left ureter  We identify a large lobular brownish stone  We used holmium laser energy at 1 joule to fracture the stone up into multiple pieces  I am able to retrieve all the stone particles with a 3 wire basket  I do not think we need to leave a stent in  The Glidewire was removed  We then removed the telescope  He goes to recovery in good condition      Findings/Key Components:      Successful removal of left ureteral stone  With laser energy  Bilateral stent removal  No stent was left in place    Estimated Blood Loss:      None

## 2025-04-10 NOTE — ANESTHESIA POSTPROCEDURE EVALUATION
Anesthesia Post Evaluation    Patient: Stuart Adler    Procedure(s) Performed: Procedure(s) (LRB):  CYSTOSCOPY (N/A)  REMOVAL, CALCULUS, URETER, URETEROSCOPIC (Left)  URETEROSCOPY, WITH LASER LITHOTRIPSY (Left)  CYSTOSCOPY, WITH URETERAL STENT REMOVAL (Bilateral)    Final Anesthesia Type: general      Patient location during evaluation: PACU  Patient participation: Yes- Able to Participate  Level of consciousness: awake and alert  Post-procedure vital signs: reviewed and stable  Pain management: adequate  Airway patency: patent    PONV status at discharge: No PONV  Anesthetic complications: no      Cardiovascular status: blood pressure returned to baseline and stable  Respiratory status: unassisted and room air  Hydration status: euvolemic  Follow-up not needed.              Vitals Value Taken Time   /77 04/10/25 13:46   Temp 36.3 °C (97.4 °F) 04/10/25 13:00   Pulse 60 04/10/25 13:56   Resp 18 04/10/25 13:56   SpO2 96 % 04/10/25 13:56   Vitals shown include unfiled device data.      Event Time   Out of Recovery 13:57:35         Pain/Tristan Score: Pain Rating Prior to Med Admin: 7 (4/10/2025  1:43 PM)  Tristan Score: 9 (4/10/2025  1:45 PM)

## 2025-04-10 NOTE — ANESTHESIA PREPROCEDURE EVALUATION
04/10/2025  Stuart Adler is a 64 y.o., male.      Pre-op Assessment    I have reviewed the Patient Summary Reports.     I have reviewed the Nursing Notes. I have reviewed the NPO Status.   I have reviewed the Medications.     Review of Systems  Anesthesia Hx:  No problems with previous Anesthesia             Denies Family Hx of Anesthesia complications.    Denies Personal Hx of Anesthesia complications.                    Social:  Non-Smoker       Hematology/Oncology:  Hematology Normal                                     EENT/Dental:  EENT/Dental Normal           Cardiovascular:     Hypertension  Past MI CAD  asymptomatic            ECG has been reviewed.                            Pulmonary:  Pulmonary Normal                       Renal/:  Chronic Renal Disease (Kidney stone)                Hepatic/GI:  Hepatic/GI Normal                    Musculoskeletal:  Musculoskeletal Normal                Neurological:  Neurology Normal                                      Endocrine:  Endocrine Normal            Psych:  Psychiatric History (History alcoholism)                  Physical Exam  General: Well nourished    Airway:  Mallampati: II   Mouth Opening: Normal  TM Distance: Normal  Tongue: Normal  Neck ROM: Normal ROM    Chest/Lungs:  Clear to auscultation, Normal Respiratory Rate    Heart:  Rate: Normal  Rhythm: Regular Rhythm        Anesthesia Plan  Type of Anesthesia, risks & benefits discussed:    Anesthesia Type: Gen Supraglottic Airway  Intra-op Monitoring Plan: Standard ASA Monitors  Post Op Pain Control Plan: IV/PO Opioids PRN and multimodal analgesia  Induction:  IV  Airway Plan: Video  Informed Consent: Informed consent signed with the Patient and all parties understand the risks and agree with anesthesia plan.  All questions answered.   ASA Score: 3  Anesthesia Plan Notes: LMA  Multimodal  analgesia with ofirmev 1000mg, decadron 8 mg.  PONV prophylaxis with Pepcid 20 mg IV, and Zofran 4 mg IV.        Ready For Surgery From Anesthesia Perspective.     .

## 2025-04-10 NOTE — H&P
[facility]  History & Physical    SUBJECTIVE:     Chief Complaint/Reason for Admission:     History of Present Illness:  Patient is a 64 y.o. male presents with history of left distal ureteral stone  Patient had a stent placed several days ago  Here for stone extraction  May need holmium laser energy to fracture the stone    PTA Medications   Medication Sig    amLODIPine (NORVASC) 5 MG tablet Take 1 tablet (5 mg total) by mouth once daily.    atorvastatin (LIPITOR) 80 MG tablet Take 1 tablet (80 mg total) by mouth once daily.    lisinopriL 10 MG tablet Take 1 tablet (10 mg total) by mouth once daily.    metoprolol tartrate (LOPRESSOR) 25 MG tablet Take 1 tablet (25 mg total) by mouth 2 (two) times daily.    oxyCODONE-acetaminophen (PERCOCET)  mg per tablet Take 1 tablet by mouth every 6 (six) hours as needed for Pain.    polyethylene glycol (GLYCOLAX) 17 gram PwPk Take 17 g by mouth once daily.    aspirin 81 MG Chew CHEW AND SWALLOW 1 TABLET(81 MG) BY MOUTH EVERY DAY    nitroGLYCERIN (NITROSTAT) 0.4 MG SL tablet Place 1 tablet (0.4 mg total) under the tongue every 5 (five) minutes as needed for Chest pain.       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    Kidney stones 04/2025    Right eye injury     removed due to skin CA    Skin cancer     Smoker      Past Surgical History:   Procedure Laterality Date    ANGIOGRAM, CORONARY, WITH LEFT HEART CATHETERIZATION N/A 1/28/2022    Procedure: Angiogram, Coronary, with Left Heart Cath;  Surgeon: Nacho Orellana MD;  Location: [facility] CATH/EP LAB;  Service: Cardiology;  Laterality: N/A;    CYSTOSCOPY W/ URETERAL STENT PLACEMENT Right 3/27/2025    Procedure: CYSTOSCOPY, WITH URETERAL STENT INSERTION;  Surgeon: Kaleb Gaffney MD;  Location: [facility]  OR;  Service: Urology;  Laterality: Right;    FACIAL COSMETIC SURGERY      PERCUTANEOUS CORONARY INTERVENTION (PCI) FOR CHRONIC TOTAL OCCLUSION OF CORONARY ARTERY       Family History   Problem Relation Name Age of Onset    Heart disease Father       Social History[1]     Review of Systems:  Negative    OBJECTIVE:     Vital Signs (Most Recent):  Temp: 97.8 °F (36.6 °C) (04/10/25 0954)  Pulse: (!) 58 (04/10/25 0955)  Resp: 17 (04/10/25 0954)  BP: 133/80 (04/10/25 0955)  SpO2: (!) 93 % (04/10/25 0954)    Inpatient Medications:  Current Medications[2]       ASSESSMENT/PLAN:       Left distal ureteral stone, 7 mm    Here for ureteroscopy with stone extraction, possible holmium laser treatment         [1]   Social History  Tobacco Use    Smoking status: Every Day     Current packs/day: 0.75     Average packs/day: 0.8 packs/day for 39.3 years (29.5 ttl pk-yrs)     Types: Cigarettes     Start date: 1986   Substance Use Topics    Alcohol use: Yes     Comment: 1/4 bottle whiskey every day    Drug use: Yes     Types: Marijuana   [2]   No current facility-administered medications for this encounter.

## 2025-04-10 NOTE — DISCHARGE SUMMARY
Patient comes in for outpatient ureteral stone removal  With holmium laser    Procedure is done w no complication    After a brief stay in recovery, patient is discharged in good condition    Meds given:  Lortab and Bactrim    F/u office:  2 weeks with a KUB

## 2025-04-10 NOTE — PLAN OF CARE
1404- pt is alert and oriented breathing even unlabored with no complaints of pain at this time. Pt sitting up drinking fluids. 1445- pt tolerated po intake with no n/v. Pt voided 350 of light pink urine. Detailed discharge instructions given to the pt.

## 2025-04-11 ENCOUNTER — PATIENT OUTREACH (OUTPATIENT)
Dept: ADMINISTRATIVE | Facility: OTHER | Age: 65
End: 2025-04-11
Payer: MEDICARE

## 2025-04-11 NOTE — PROGRESS NOTES
CHW - Outreach Attempt    Community Health Worker left a voicemail message for 1st attempt to contact patient regarding: follow  Community Health Worker to attempt to contact patient on:[phone]      Sent texts with resources:    [phone] to call and schedule PCP    Life Needs Financial Assistance by [employer] ([employer])  Critical Financial Assistance by [facility]  [place] Financial Assistance Program by [employer]    [place] [employer] [place] Vehicle Giveaway Program  Progressive  How to get a Free Car - Who can Apply?  7-926-[employer] Cars  Apply for a Car - [employer]    Supportive Services for Veterans Families (SSVF) by [place] [address], [place] [address]  Phone: [phone]  -Temporary financial assistance  -Case management  -Assistance in obtaining VA benefits  -Outreach services

## 2025-04-22 ENCOUNTER — PATIENT OUTREACH (OUTPATIENT)
Dept: ADMINISTRATIVE | Facility: OTHER | Age: 65
End: 2025-04-22
Payer: MEDICARE

## 2025-04-22 NOTE — PROGRESS NOTES
CHW - Follow Up    This Community Health Worker completed a follow up visit with patient via telephone today.  Pt/Caregiver reported: did not receive text messages from 2 weeks ago- now PT is ending he will be able to focus on resources and will get things done  Community Health Worker provided: resent texts with resources  Follow up required:   Follow-up Outreach - Due: 5/6/2025

## 2025-04-23 ENCOUNTER — DOCUMENT SCAN (OUTPATIENT)
Dept: HOME HEALTH SERVICES | Facility: HOSPITAL | Age: 65
End: 2025-04-23
Payer: MEDICARE

## 2025-04-30 ENCOUNTER — CLINICAL SUPPORT (OUTPATIENT)
Dept: CARDIOLOGY | Facility: HOSPITAL | Age: 65
End: 2025-04-30
Attending: STUDENT IN AN ORGANIZED HEALTH CARE EDUCATION/TRAINING PROGRAM
Payer: MEDICARE

## 2025-04-30 ENCOUNTER — HOSPITAL ENCOUNTER (INPATIENT)
Facility: HOSPITAL | Age: 65
LOS: 3 days | Discharge: HOME HEALTH | DRG: 321 | End: 2025-05-03
Attending: STUDENT IN AN ORGANIZED HEALTH CARE EDUCATION/TRAINING PROGRAM | Admitting: STUDENT IN AN ORGANIZED HEALTH CARE EDUCATION/TRAINING PROGRAM
Payer: MEDICARE

## 2025-04-30 DIAGNOSIS — I21.3 ST ELEVATION MYOCARDIAL INFARCTION (STEMI), UNSPECIFIED ARTERY: Primary | ICD-10-CM

## 2025-04-30 DIAGNOSIS — I25.10 CORONARY ARTERY DISEASE INVOLVING NATIVE CORONARY ARTERY OF NATIVE HEART WITHOUT ANGINA PECTORIS: Chronic | ICD-10-CM

## 2025-04-30 DIAGNOSIS — R79.1 ELEVATED INR: ICD-10-CM

## 2025-04-30 DIAGNOSIS — I47.21 TORSADES DE POINTES: ICD-10-CM

## 2025-04-30 DIAGNOSIS — I21.3 STEMI (ST ELEVATION MYOCARDIAL INFARCTION): ICD-10-CM

## 2025-04-30 DIAGNOSIS — C44.310 FACIAL BASAL CELL CANCER: Chronic | ICD-10-CM

## 2025-04-30 DIAGNOSIS — C44.90 SKIN CARCINOMA: ICD-10-CM

## 2025-04-30 DIAGNOSIS — R07.9 CHEST PAIN: ICD-10-CM

## 2025-04-30 PROBLEM — I10 HYPERTENSION: Status: ACTIVE | Noted: 2025-04-30

## 2025-04-30 LAB
ABSOLUTE EOSINOPHIL (SMH): 0.12 K/UL
ABSOLUTE MONOCYTE (SMH): 0.93 K/UL (ref 0.3–1)
ABSOLUTE NEUTROPHIL COUNT (SMH): 3.4 K/UL (ref 1.8–7.7)
ALBUMIN SERPL-MCNC: 3.8 G/DL (ref 3.5–5.2)
ALP SERPL-CCNC: 78 UNIT/L (ref 55–135)
ALT SERPL-CCNC: 15 UNIT/L (ref 10–44)
ANION GAP (SMH): 8 MMOL/L (ref 8–16)
AORTIC ROOT ANNULUS: 4.1 CM
AORTIC VALVE CUSP SEPERATION: 1.9 CM
APICAL FOUR CHAMBER EJECTION FRACTION: 51 %
APICAL TWO CHAMBER EJECTION FRACTION: 45 %
AST SERPL-CCNC: 15 UNIT/L (ref 10–40)
AV INDEX (PROSTH): 0.81
AV MEAN GRADIENT: 3 MMHG
AV PEAK GRADIENT: 5 MMHG
AV VALVE AREA BY VELOCITY RATIO: 3.5 CM²
AV VALVE AREA: 3.1 CM²
AV VELOCITY RATIO: 0.91
BASOPHILS # BLD AUTO: 0.04 K/UL
BASOPHILS NFR BLD AUTO: 0.7 %
BILIRUB SERPL-MCNC: 0.5 MG/DL (ref 0.1–1)
BNP SERPL-MCNC: 49 PG/ML
BSA FOR ECHO PROCEDURE: 2.66 M2
BUN SERPL-MCNC: 16 MG/DL (ref 8–23)
CALCIUM SERPL-MCNC: 9.2 MG/DL (ref 8.7–10.5)
CHLORIDE SERPL-SCNC: 105 MMOL/L (ref 95–110)
CO2 SERPL-SCNC: 24 MMOL/L (ref 23–29)
CREAT SERPL-MCNC: 0.9 MG/DL (ref 0.5–1.4)
CV ECHO LV RWT: 0.4 CM
DOP CALC AO PEAK VEL: 1.1 M/S
DOP CALC AO VTI: 27.1 CM
DOP CALC LVOT AREA: 3.8 CM2
DOP CALC LVOT DIAMETER: 2.2 CM
DOP CALC LVOT PEAK VEL: 1 M/S
DOP CALC LVOT STROKE VOLUME: 83.2 CM3
DOP CALC MV VTI: 25.6 CM
DOP CALCLVOT PEAK VEL VTI: 21.9 CM
E WAVE DECELERATION TIME: 229 MSEC
E/A RATIO: 1.11
E/E' RATIO: 10 M/S
ECHO LV POSTERIOR WALL: 1.2 CM (ref 0.6–1.1)
ERYTHROCYTE [DISTWIDTH] IN BLOOD BY AUTOMATED COUNT: 12.3 % (ref 11.5–14.5)
FRACTIONAL SHORTENING: 21.7 % (ref 28–44)
GFR SERPLBLD CREATININE-BSD FMLA CKD-EPI: >60 ML/MIN/1.73/M2
GLUCOSE SERPL-MCNC: 137 MG/DL (ref 70–110)
HCT VFR BLD AUTO: 46.2 % (ref 40–54)
HGB BLD-MCNC: 15.1 GM/DL (ref 14–18)
IMM GRANULOCYTES # BLD AUTO: 0.02 K/UL (ref 0–0.04)
IMM GRANULOCYTES NFR BLD AUTO: 0.3 % (ref 0–0.5)
INR PPP: 1.1 (ref 0.8–1.2)
INR PPP: 3.2 (ref 0.8–1.2)
INTERVENTRICULAR SEPTUM: 1.1 CM (ref 0.6–1.1)
IVC DIAMETER: 1.6 CM
LEFT ATRIUM AREA SYSTOLIC (APICAL 2 CHAMBER): 25.3 CM2
LEFT ATRIUM AREA SYSTOLIC (APICAL 4 CHAMBER): 21.1 CM2
LEFT ATRIUM VOLUME INDEX MOD: 28 ML/M2
LEFT ATRIUM VOLUME MOD: 72 ML
LEFT INTERNAL DIMENSION IN SYSTOLE: 4.7 CM (ref 2.1–4)
LEFT VENTRICLE DIASTOLIC VOLUME INDEX: 69.38 ML/M2
LEFT VENTRICLE DIASTOLIC VOLUME: 179 ML
LEFT VENTRICLE END DIASTOLIC VOLUME APICAL 2 CHAMBER: 108 ML
LEFT VENTRICLE END DIASTOLIC VOLUME APICAL 4 CHAMBER: 142 ML
LEFT VENTRICLE END SYSTOLIC VOLUME APICAL 2 CHAMBER: 87 ML
LEFT VENTRICLE END SYSTOLIC VOLUME APICAL 4 CHAMBER: 53.3 ML
LEFT VENTRICLE MASS INDEX: 115 G/M2
LEFT VENTRICLE SYSTOLIC VOLUME INDEX: 40.3 ML/M2
LEFT VENTRICLE SYSTOLIC VOLUME: 104 ML
LEFT VENTRICULAR INTERNAL DIMENSION IN DIASTOLE: 6 CM (ref 3.5–6)
LEFT VENTRICULAR MASS: 296.6 G
LV LATERAL E/E' RATIO: 8.5 M/S
LV SEPTAL E/E' RATIO: 13.6 M/S
LVED V (TEICH): 179 ML
LVES V (TEICH): 104 ML
LVOT MG: 2 MMHG
LVOT MV: 0.59 CM/S
LYMPHOCYTES # BLD AUTO: 1.48 K/UL (ref 1–4.8)
MAGNESIUM SERPL-MCNC: 1.9 MG/DL (ref 1.6–2.6)
MCH RBC QN AUTO: 32.3 PG (ref 27–31)
MCHC RBC AUTO-ENTMCNC: 32.7 G/DL (ref 32–36)
MCV RBC AUTO: 99 FL (ref 82–98)
MR PISA EROA: 0.14 CM2
MV MEAN GRADIENT: 1 MMHG
MV PEAK A VEL: 0.61 M/S
MV PEAK E VEL: 0.68 M/S
MV PEAK GRADIENT: 3 MMHG
MV STENOSIS PRESSURE HALF TIME: 128 MS
MV VALVE AREA BY CONTINUITY EQUATION: 3.25 CM2
MV VALVE AREA P 1/2 METHOD: 1.72 CM2
NUCLEATED RBC (/100WBC) (SMH): 0 /100 WBC
OHS LV EJECTION FRACTION SIMPSONS BIPLANE MOD: 49 %
OHS QRS DURATION: 92 MS
OHS QRS DURATION: 94 MS
OHS QTC CALCULATION: 432 MS
OHS QTC CALCULATION: 465 MS
PISA MRMAX VEL: 5.02 M/S
PISA RADIUS: 0.7 CM
PISA VN NYQUIST MS: 0.23 M/S
PISA VN NYQUIST: 0.23 M/S
PLATELET # BLD AUTO: 164 K/UL (ref 150–450)
PMV BLD AUTO: 10.8 FL (ref 9.2–12.9)
POC ACTIVATED CLOTTING TIME K: 147 SEC (ref 74–137)
POC ACTIVATED CLOTTING TIME K: 181 SEC (ref 74–137)
POC ACTIVATED CLOTTING TIME K: 251 SEC (ref 74–137)
POC ACTIVATED CLOTTING TIME K: 268 SEC (ref 74–137)
POCT GLUCOSE: 106 MG/DL (ref 70–110)
POCT GLUCOSE: 92 MG/DL (ref 70–110)
POTASSIUM SERPL-SCNC: 3.9 MMOL/L (ref 3.5–5.1)
PROT SERPL-MCNC: 6.6 GM/DL (ref 6–8.4)
PROTHROMBIN TIME: 11.8 SECONDS (ref 9–12.5)
PROTHROMBIN TIME: 33.2 SECONDS (ref 9–12.5)
RBC # BLD AUTO: 4.67 M/UL (ref 4.6–6.2)
RELATIVE EOSINOPHIL (SMH): 2 % (ref 0–8)
RELATIVE LYMPHOCYTE (SMH): 24.7 % (ref 18–48)
RELATIVE MONOCYTE (SMH): 15.6 % (ref 4–15)
RELATIVE NEUTROPHIL (SMH): 56.7 % (ref 38–73)
RIGHT ATRIUM VOLUME AREA LENGTH APICAL 4 CHAMBER: 38.5 ML
RV TISSUE DOPPLER FREE WALL SYSTOLIC VELOCITY 1 (APICAL 4 CHAMBER VIEW): 9.79 CM/S
SAMPLE: ABNORMAL
SODIUM SERPL-SCNC: 137 MMOL/L (ref 136–145)
TDI LATERAL: 0.08 M/S
TDI SEPTAL: 0.05 M/S
TDI: 0.07 M/S
TRICUSPID ANNULAR PLANE SYSTOLIC EXCURSION: 2.6 CM
TROPONIN HIGH SENSITIVE (SMH): 41.4 PG/ML
TROPONIN HIGH SENSITIVE (SMH): ABNORMAL PG/ML
WBC # BLD AUTO: 5.98 K/UL (ref 3.9–12.7)
Z-SCORE OF LEFT VENTRICULAR DIMENSION IN END DIASTOLE: -10.64
Z-SCORE OF LEFT VENTRICULAR DIMENSION IN END SYSTOLE: -6.15

## 2025-04-30 PROCEDURE — 93005 ELECTROCARDIOGRAM TRACING: CPT | Performed by: GENERAL PRACTICE

## 2025-04-30 PROCEDURE — C1769 GUIDE WIRE: HCPCS | Performed by: INTERNAL MEDICINE

## 2025-04-30 PROCEDURE — 93010 ELECTROCARDIOGRAM REPORT: CPT | Mod: XE,ICN,, | Performed by: GENERAL PRACTICE

## 2025-04-30 PROCEDURE — 83880 ASSAY OF NATRIURETIC PEPTIDE: CPT | Performed by: STUDENT IN AN ORGANIZED HEALTH CARE EDUCATION/TRAINING PROGRAM

## 2025-04-30 PROCEDURE — 99152 MOD SED SAME PHYS/QHP 5/>YRS: CPT | Performed by: INTERNAL MEDICINE

## 2025-04-30 PROCEDURE — 25000003 PHARM REV CODE 250: Performed by: INTERNAL MEDICINE

## 2025-04-30 PROCEDURE — 20000000 HC ICU ROOM

## 2025-04-30 PROCEDURE — 63600175 PHARM REV CODE 636 W HCPCS

## 2025-04-30 PROCEDURE — C9606 PERC D-E COR REVASC W AMI S: HCPCS | Mod: RC | Performed by: INTERNAL MEDICINE

## 2025-04-30 PROCEDURE — 84484 ASSAY OF TROPONIN QUANT: CPT | Performed by: STUDENT IN AN ORGANIZED HEALTH CARE EDUCATION/TRAINING PROGRAM

## 2025-04-30 PROCEDURE — 92978 ENDOLUMINL IVUS OCT C 1ST: CPT | Mod: 26,RC,, | Performed by: INTERNAL MEDICINE

## 2025-04-30 PROCEDURE — C1894 INTRO/SHEATH, NON-LASER: HCPCS | Performed by: INTERNAL MEDICINE

## 2025-04-30 PROCEDURE — 99900035 HC TECH TIME PER 15 MIN (STAT)

## 2025-04-30 PROCEDURE — 63600175 PHARM REV CODE 636 W HCPCS: Performed by: STUDENT IN AN ORGANIZED HEALTH CARE EDUCATION/TRAINING PROGRAM

## 2025-04-30 PROCEDURE — 96374 THER/PROPH/DIAG INJ IV PUSH: CPT

## 2025-04-30 PROCEDURE — 36415 COLL VENOUS BLD VENIPUNCTURE: CPT | Performed by: INTERNAL MEDICINE

## 2025-04-30 PROCEDURE — 85025 COMPLETE CBC W/AUTO DIFF WBC: CPT | Performed by: STUDENT IN AN ORGANIZED HEALTH CARE EDUCATION/TRAINING PROGRAM

## 2025-04-30 PROCEDURE — 96375 TX/PRO/DX INJ NEW DRUG ADDON: CPT

## 2025-04-30 PROCEDURE — C1753 CATH, INTRAVAS ULTRASOUND: HCPCS | Performed by: INTERNAL MEDICINE

## 2025-04-30 PROCEDURE — 27000221 HC OXYGEN, UP TO 24 HOURS

## 2025-04-30 PROCEDURE — 99900031 HC PATIENT EDUCATION (STAT)

## 2025-04-30 PROCEDURE — 93010 ELECTROCARDIOGRAM REPORT: CPT | Mod: 76,XE,ICN, | Performed by: GENERAL PRACTICE

## 2025-04-30 PROCEDURE — B240ZZ3 ULTRASONOGRAPHY OF SINGLE CORONARY ARTERY, INTRAVASCULAR: ICD-10-PCS | Performed by: INTERNAL MEDICINE

## 2025-04-30 PROCEDURE — 92978 ENDOLUMINL IVUS OCT C 1ST: CPT | Mod: RC | Performed by: INTERNAL MEDICINE

## 2025-04-30 PROCEDURE — 82962 GLUCOSE BLOOD TEST: CPT

## 2025-04-30 PROCEDURE — 94799 UNLISTED PULMONARY SVC/PX: CPT

## 2025-04-30 PROCEDURE — 83735 ASSAY OF MAGNESIUM: CPT | Performed by: STUDENT IN AN ORGANIZED HEALTH CARE EDUCATION/TRAINING PROGRAM

## 2025-04-30 PROCEDURE — 25500020 PHARM REV CODE 255: Performed by: INTERNAL MEDICINE

## 2025-04-30 PROCEDURE — 25000003 PHARM REV CODE 250: Performed by: STUDENT IN AN ORGANIZED HEALTH CARE EDUCATION/TRAINING PROGRAM

## 2025-04-30 PROCEDURE — 93306 TTE W/DOPPLER COMPLETE: CPT | Mod: 26,,, | Performed by: GENERAL PRACTICE

## 2025-04-30 PROCEDURE — 94761 N-INVAS EAR/PLS OXIMETRY MLT: CPT

## 2025-04-30 PROCEDURE — 92941 PRQ TRLML REVSC TOT OCCL AMI: CPT | Mod: RC,,, | Performed by: INTERNAL MEDICINE

## 2025-04-30 PROCEDURE — 93454 CORONARY ARTERY ANGIO S&I: CPT | Mod: XU | Performed by: INTERNAL MEDICINE

## 2025-04-30 PROCEDURE — 027034Z DILATION OF CORONARY ARTERY, ONE ARTERY WITH DRUG-ELUTING INTRALUMINAL DEVICE, PERCUTANEOUS APPROACH: ICD-10-PCS | Performed by: INTERNAL MEDICINE

## 2025-04-30 PROCEDURE — 63600175 PHARM REV CODE 636 W HCPCS: Performed by: INTERNAL MEDICINE

## 2025-04-30 PROCEDURE — 5A12012 PERFORMANCE OF CARDIAC OUTPUT, SINGLE, MANUAL: ICD-10-PCS | Performed by: STUDENT IN AN ORGANIZED HEALTH CARE EDUCATION/TRAINING PROGRAM

## 2025-04-30 PROCEDURE — 93454 CORONARY ARTERY ANGIO S&I: CPT | Mod: 26,XU,51, | Performed by: INTERNAL MEDICINE

## 2025-04-30 PROCEDURE — 99223 1ST HOSP IP/OBS HIGH 75: CPT | Mod: 25,ICN,, | Performed by: INTERNAL MEDICINE

## 2025-04-30 PROCEDURE — 36415 COLL VENOUS BLD VENIPUNCTURE: CPT | Performed by: STUDENT IN AN ORGANIZED HEALTH CARE EDUCATION/TRAINING PROGRAM

## 2025-04-30 PROCEDURE — 85610 PROTHROMBIN TIME: CPT | Performed by: STUDENT IN AN ORGANIZED HEALTH CARE EDUCATION/TRAINING PROGRAM

## 2025-04-30 PROCEDURE — 99153 MOD SED SAME PHYS/QHP EA: CPT | Performed by: INTERNAL MEDICINE

## 2025-04-30 PROCEDURE — 5A2204Z RESTORATION OF CARDIAC RHYTHM, SINGLE: ICD-10-PCS | Performed by: STUDENT IN AN ORGANIZED HEALTH CARE EDUCATION/TRAINING PROGRAM

## 2025-04-30 PROCEDURE — 80053 COMPREHEN METABOLIC PANEL: CPT | Performed by: STUDENT IN AN ORGANIZED HEALTH CARE EDUCATION/TRAINING PROGRAM

## 2025-04-30 PROCEDURE — 93306 TTE W/DOPPLER COMPLETE: CPT

## 2025-04-30 PROCEDURE — B211YZZ FLUOROSCOPY OF MULTIPLE CORONARY ARTERIES USING OTHER CONTRAST: ICD-10-PCS | Performed by: INTERNAL MEDICINE

## 2025-04-30 PROCEDURE — 99152 MOD SED SAME PHYS/QHP 5/>YRS: CPT | Mod: ,,, | Performed by: INTERNAL MEDICINE

## 2025-04-30 PROCEDURE — C1874 STENT, COATED/COV W/DEL SYS: HCPCS | Performed by: INTERNAL MEDICINE

## 2025-04-30 PROCEDURE — 85610 PROTHROMBIN TIME: CPT | Performed by: INTERNAL MEDICINE

## 2025-04-30 PROCEDURE — 99291 CRITICAL CARE FIRST HOUR: CPT

## 2025-04-30 PROCEDURE — C1725 CATH, TRANSLUMIN NON-LASER: HCPCS | Performed by: INTERNAL MEDICINE

## 2025-04-30 DEVICE — EVEROLIMUS-ELUTING PLATINUM CHROMIUM CORONARY STENT SYSTEM
Type: IMPLANTABLE DEVICE | Site: CORONARY | Status: FUNCTIONAL
Brand: SYNERGY™ XD

## 2025-04-30 RX ORDER — MORPHINE SULFATE 4 MG/ML
4 INJECTION, SOLUTION INTRAMUSCULAR; INTRAVENOUS
Refills: 0 | Status: COMPLETED | OUTPATIENT
Start: 2025-04-30 | End: 2025-04-30

## 2025-04-30 RX ORDER — HEPARIN SODIUM 5000 [USP'U]/ML
5000 INJECTION, SOLUTION INTRAVENOUS; SUBCUTANEOUS
Status: COMPLETED | OUTPATIENT
Start: 2025-04-30 | End: 2025-04-30

## 2025-04-30 RX ORDER — POLYETHYLENE GLYCOL 3350 17 G/17G
17 POWDER, FOR SOLUTION ORAL DAILY PRN
Status: DISCONTINUED | OUTPATIENT
Start: 2025-04-30 | End: 2025-05-03 | Stop reason: HOSPADM

## 2025-04-30 RX ORDER — HEPARIN SODIUM 1000 [USP'U]/ML
INJECTION, SOLUTION INTRAVENOUS; SUBCUTANEOUS
Status: DISCONTINUED | OUTPATIENT
Start: 2025-04-30 | End: 2025-04-30 | Stop reason: HOSPADM

## 2025-04-30 RX ORDER — TICAGRELOR 90 MG/1
90 TABLET, FILM COATED ORAL 2 TIMES DAILY
Status: DISCONTINUED | OUTPATIENT
Start: 2025-04-30 | End: 2025-05-03 | Stop reason: HOSPADM

## 2025-04-30 RX ORDER — LISINOPRIL 10 MG/1
10 TABLET ORAL DAILY
Status: DISCONTINUED | OUTPATIENT
Start: 2025-04-30 | End: 2025-05-02

## 2025-04-30 RX ORDER — ACETAMINOPHEN 325 MG/1
650 TABLET ORAL EVERY 4 HOURS PRN
Status: DISCONTINUED | OUTPATIENT
Start: 2025-04-30 | End: 2025-05-03 | Stop reason: HOSPADM

## 2025-04-30 RX ORDER — TALC
3 POWDER (GRAM) TOPICAL NIGHTLY PRN
Status: DISCONTINUED | OUTPATIENT
Start: 2025-04-30 | End: 2025-05-03 | Stop reason: HOSPADM

## 2025-04-30 RX ORDER — SODIUM,POTASSIUM PHOSPHATES 280-250MG
2 POWDER IN PACKET (EA) ORAL
Status: DISCONTINUED | OUTPATIENT
Start: 2025-04-30 | End: 2025-05-03 | Stop reason: HOSPADM

## 2025-04-30 RX ORDER — METOPROLOL TARTRATE 25 MG/1
25 TABLET, FILM COATED ORAL 2 TIMES DAILY
Status: DISCONTINUED | OUTPATIENT
Start: 2025-04-30 | End: 2025-04-30

## 2025-04-30 RX ORDER — MORPHINE SULFATE 2 MG/ML
2 INJECTION, SOLUTION INTRAMUSCULAR; INTRAVENOUS EVERY 4 HOURS PRN
Status: DISCONTINUED | OUTPATIENT
Start: 2025-04-30 | End: 2025-05-03 | Stop reason: HOSPADM

## 2025-04-30 RX ORDER — GLUCAGON 1 MG
1 KIT INJECTION
Status: DISCONTINUED | OUTPATIENT
Start: 2025-04-30 | End: 2025-05-03 | Stop reason: HOSPADM

## 2025-04-30 RX ORDER — IODIXANOL 320 MG/ML
INJECTION, SOLUTION INTRAVASCULAR
Status: DISCONTINUED | OUTPATIENT
Start: 2025-04-30 | End: 2025-04-30 | Stop reason: HOSPADM

## 2025-04-30 RX ORDER — SODIUM CHLORIDE 9 MG/ML
INJECTION, SOLUTION INTRAVENOUS CONTINUOUS
Status: ACTIVE | OUTPATIENT
Start: 2025-04-30 | End: 2025-04-30

## 2025-04-30 RX ORDER — ONDANSETRON HYDROCHLORIDE 2 MG/ML
4 INJECTION, SOLUTION INTRAVENOUS
Status: COMPLETED | OUTPATIENT
Start: 2025-04-30 | End: 2025-04-30

## 2025-04-30 RX ORDER — NAPROXEN SODIUM 220 MG/1
81 TABLET, FILM COATED ORAL DAILY
Status: DISCONTINUED | OUTPATIENT
Start: 2025-05-01 | End: 2025-04-30

## 2025-04-30 RX ORDER — MUPIROCIN 20 MG/G
OINTMENT TOPICAL 2 TIMES DAILY
Status: DISCONTINUED | OUTPATIENT
Start: 2025-04-30 | End: 2025-05-03 | Stop reason: HOSPADM

## 2025-04-30 RX ORDER — MORPHINE SULFATE 2 MG/ML
2 INJECTION, SOLUTION INTRAMUSCULAR; INTRAVENOUS
Refills: 0 | Status: COMPLETED | OUTPATIENT
Start: 2025-04-30 | End: 2025-04-30

## 2025-04-30 RX ORDER — IBUPROFEN 200 MG
24 TABLET ORAL
Status: DISCONTINUED | OUTPATIENT
Start: 2025-04-30 | End: 2025-05-03 | Stop reason: HOSPADM

## 2025-04-30 RX ORDER — ASPIRIN 81 MG/1
81 TABLET ORAL DAILY
Status: DISCONTINUED | OUTPATIENT
Start: 2025-05-01 | End: 2025-05-03 | Stop reason: HOSPADM

## 2025-04-30 RX ORDER — LIDOCAINE HYDROCHLORIDE 20 MG/ML
1 INJECTION INTRAVENOUS ONCE
Status: DISCONTINUED | OUTPATIENT
Start: 2025-04-30 | End: 2025-05-03 | Stop reason: HOSPADM

## 2025-04-30 RX ORDER — ATORVASTATIN CALCIUM 40 MG/1
80 TABLET, FILM COATED ORAL DAILY
Status: DISCONTINUED | OUTPATIENT
Start: 2025-04-30 | End: 2025-05-03 | Stop reason: HOSPADM

## 2025-04-30 RX ORDER — MORPHINE SULFATE 2 MG/ML
INJECTION, SOLUTION INTRAMUSCULAR; INTRAVENOUS
Status: COMPLETED
Start: 2025-04-30 | End: 2025-04-30

## 2025-04-30 RX ORDER — ONDANSETRON HYDROCHLORIDE 2 MG/ML
4 INJECTION, SOLUTION INTRAVENOUS EVERY 6 HOURS PRN
Status: DISCONTINUED | OUTPATIENT
Start: 2025-04-30 | End: 2025-05-03 | Stop reason: HOSPADM

## 2025-04-30 RX ORDER — LIDOCAINE HYDROCHLORIDE ANHYDROUS AND DEXTROSE MONOHYDRATE .8; 5 G/100ML; G/100ML
1 INJECTION, SOLUTION INTRAVENOUS CONTINUOUS
Status: DISCONTINUED | OUTPATIENT
Start: 2025-04-30 | End: 2025-04-30

## 2025-04-30 RX ORDER — TICAGRELOR 90 MG/1
TABLET, FILM COATED ORAL
Status: DISCONTINUED | OUTPATIENT
Start: 2025-04-30 | End: 2025-04-30

## 2025-04-30 RX ORDER — LANOLIN ALCOHOL/MO/W.PET/CERES
800 CREAM (GRAM) TOPICAL
Status: DISCONTINUED | OUTPATIENT
Start: 2025-04-30 | End: 2025-05-03 | Stop reason: HOSPADM

## 2025-04-30 RX ORDER — IBUPROFEN 200 MG
16 TABLET ORAL
Status: DISCONTINUED | OUTPATIENT
Start: 2025-04-30 | End: 2025-05-03 | Stop reason: HOSPADM

## 2025-04-30 RX ORDER — ALUMINUM HYDROXIDE, MAGNESIUM HYDROXIDE, AND SIMETHICONE 1200; 120; 1200 MG/30ML; MG/30ML; MG/30ML
30 SUSPENSION ORAL 4 TIMES DAILY PRN
Status: DISCONTINUED | OUTPATIENT
Start: 2025-04-30 | End: 2025-05-03 | Stop reason: HOSPADM

## 2025-04-30 RX ORDER — FENTANYL CITRATE 50 UG/ML
INJECTION, SOLUTION INTRAMUSCULAR; INTRAVENOUS
Status: DISCONTINUED | OUTPATIENT
Start: 2025-04-30 | End: 2025-04-30 | Stop reason: HOSPADM

## 2025-04-30 RX ORDER — PANTOPRAZOLE SODIUM 40 MG/1
40 TABLET, DELAYED RELEASE ORAL DAILY
Status: DISCONTINUED | OUTPATIENT
Start: 2025-04-30 | End: 2025-05-03 | Stop reason: HOSPADM

## 2025-04-30 RX ORDER — SODIUM CHLORIDE 0.9 % (FLUSH) 0.9 %
10 SYRINGE (ML) INJECTION
Status: DISCONTINUED | OUTPATIENT
Start: 2025-04-30 | End: 2025-05-03 | Stop reason: HOSPADM

## 2025-04-30 RX ORDER — ENOXAPARIN SODIUM 100 MG/ML
40 INJECTION SUBCUTANEOUS EVERY 24 HOURS
Status: CANCELLED | OUTPATIENT
Start: 2025-05-01

## 2025-04-30 RX ORDER — NALOXONE HCL 0.4 MG/ML
0.02 VIAL (ML) INJECTION
Status: DISCONTINUED | OUTPATIENT
Start: 2025-04-30 | End: 2025-05-03 | Stop reason: HOSPADM

## 2025-04-30 RX ORDER — LIDOCAINE HYDROCHLORIDE 10 MG/ML
INJECTION, SOLUTION EPIDURAL; INFILTRATION; INTRACAUDAL; PERINEURAL
Status: DISCONTINUED | OUTPATIENT
Start: 2025-04-30 | End: 2025-04-30 | Stop reason: HOSPADM

## 2025-04-30 RX ADMIN — TICAGRELOR 90 MG: 90 TABLET ORAL at 08:04

## 2025-04-30 RX ADMIN — ATORVASTATIN CALCIUM 80 MG: 40 TABLET, FILM COATED ORAL at 09:04

## 2025-04-30 RX ADMIN — LISINOPRIL 10 MG: 10 TABLET ORAL at 09:04

## 2025-04-30 RX ADMIN — PANTOPRAZOLE SODIUM 40 MG: 40 TABLET, DELAYED RELEASE ORAL at 09:04

## 2025-04-30 RX ADMIN — MORPHINE SULFATE 2 MG: 2 INJECTION, SOLUTION INTRAMUSCULAR; INTRAVENOUS at 05:04

## 2025-04-30 RX ADMIN — HEPARIN SODIUM 5000 UNITS: 5000 INJECTION INTRAVENOUS; SUBCUTANEOUS at 04:04

## 2025-04-30 RX ADMIN — MORPHINE SULFATE 4 MG: 4 INJECTION INTRAVENOUS at 05:04

## 2025-04-30 RX ADMIN — MUPIROCIN 1 G: 20 OINTMENT TOPICAL at 09:04

## 2025-04-30 RX ADMIN — ACETAMINOPHEN 650 MG: 325 TABLET ORAL at 11:04

## 2025-04-30 RX ADMIN — MORPHINE SULFATE 2 MG: 2 INJECTION, SOLUTION INTRAMUSCULAR; INTRAVENOUS at 08:04

## 2025-04-30 RX ADMIN — MORPHINE SULFATE 2 MG: 2 INJECTION, SOLUTION INTRAMUSCULAR; INTRAVENOUS at 04:04

## 2025-04-30 RX ADMIN — ONDANSETRON 4 MG: 2 INJECTION INTRAMUSCULAR; INTRAVENOUS at 05:04

## 2025-04-30 RX ADMIN — MUPIROCIN 1 G: 20 OINTMENT TOPICAL at 08:04

## 2025-04-30 RX ADMIN — SODIUM CHLORIDE: 9 INJECTION, SOLUTION INTRAVENOUS at 07:04

## 2025-04-30 NOTE — ED PROVIDER NOTES
Encounter Date: 4/30/2025       History     Chief Complaint   Patient presents with    Chest Pain     C/o CP radiating down lt arm for approx 45min. Took 2 nitro at home, feels worse than it did when had cardiac stents placed in the past.      ALEXIS Adler is a 64 y.o. male with a past medical history of CAD status post PCI that presents emergency department for evaluation of left-sided chest pain radiating to his left arm.  Started 45 minutes prior to arrival.  Took 2 nitroglycerin at home but does not feel improved.  EMS activated.  Given 325 of aspirin in route.  Feels like this has worse in his previous stents.  Initial EKG upon arrival shows STEMI.    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    Kidney stones 04/2025    Right eye injury     removed due to skin CA    Skin cancer     Smoker      Past Surgical History:   Procedure Laterality Date    ANGIOGRAM, CORONARY, WITH LEFT HEART CATHETERIZATION N/A 1/28/2022    Procedure: Angiogram, Coronary, with Left Heart Cath;  Surgeon: Nacho Orellana MD;  Location: [facility] CATH/EP LAB;  Service: Cardiology;  Laterality: N/A;    CYSTOSCOPY N/A 4/10/2025    Procedure: CYSTOSCOPY;  Surgeon: Kaleb Gaffney MD;  Location: [facility] OR;  Service: Urology;  Laterality: N/A;    CYSTOSCOPY W/ URETERAL STENT PLACEMENT Right 3/27/2025    Procedure: CYSTOSCOPY, WITH URETERAL STENT INSERTION;  Surgeon: Kaleb Gaffney MD;  Location: [facility] OR;  Service: Urology;  Laterality: Right;    CYSTOSCOPY W/ URETERAL STENT REMOVAL Bilateral 4/10/2025    Procedure: CYSTOSCOPY, WITH URETERAL STENT REMOVAL;  Surgeon: Kaleb Gaffney MD;  Location: [facility] OR;  Service: Urology;  Laterality: Bilateral;    FACIAL COSMETIC SURGERY      PERCUTANEOUS CORONARY INTERVENTION (PCI)  FOR CHRONIC TOTAL OCCLUSION OF CORONARY ARTERY      URETEROSCOPIC REMOVAL OF URETERIC CALCULUS Left 4/10/2025    Procedure: REMOVAL, CALCULUS, URETER, URETEROSCOPIC;  Surgeon: Kaleb Gaffney MD;  Location: [facility] OR;  Service: Urology;  Laterality: Left;    URETEROSCOPY, WITH LASER LITHOTRIPSY Left 4/10/2025    Procedure: URETEROSCOPY, WITH LASER LITHOTRIPSY;  Surgeon: Kaleb Gaffney MD;  Location: [facility] OR;  Service: Urology;  Laterality: Left;     Family History   Problem Relation Name Age of Onset    Heart disease Father       Social History[1]  Review of Systems   Unable to perform ROS: Acuity of condition   Cardiovascular:  Positive for chest pain.       Physical Exam     Initial Vitals   BP Pulse Resp Temp SpO2   04/30/25 0453 04/30/25 0453 04/30/25 0453 04/30/25 0453 04/30/25 0455   (!) 146/90 (!) 53 (!) 22 97.5 °F (36.4 °C) 98 %      MAP       --                Physical Exam    Nursing note and vitals reviewed.  Constitutional: He appears well-developed and well-nourished. He is diaphoretic. He appears distressed.   HENT:   Head: Atraumatic.   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   Eyes: EOM are normal. Pupils are equal, round, and reactive to light.   Neck:   Normal range of motion.  Cardiovascular:  Normal rate, regular rhythm and normal heart sounds.           Pulmonary/Chest: Breath sounds normal. No respiratory distress.   Abdominal: Abdomen is soft. He exhibits no distension.   Musculoskeletal:         General: Edema present. Normal range of motion.      Cervical back: Normal range of motion.     Neurological: He is alert and oriented to person, place, and time. He has normal strength. No cranial nerve deficit or sensory deficit. GCS score is 15. GCS eye subscore is 4. GCS verbal subscore is 5. GCS motor subscore is 6.   Skin: Capillary refill takes less than 2 seconds.   Psychiatric: He has a normal mood and affect.         ED Course   Critical  Care    Date/Time: 4/30/2025 6:20 AM    Performed by: Sanjeev Main MD  Authorized by: Kirk Pringle MD  Direct patient critical care time: 20 minutes  Ordering / reviewing critical care time: 10 minutes  Documentation critical care time: 10 minutes  Consulting other physicians critical care time: 10 minutes  Total critical care time (exclusive of procedural time) : 50 minutes  Critical care was necessary to treat or prevent imminent or life-threatening deterioration of the following conditions: cardiac failure.  Critical care was time spent personally by me on the following activities: discussions with consultants, interpretation of cardiac output measurements, evaluation of patient's response to treatment, obtaining history from patient or surrogate, examination of patient, ordering and performing treatments and interventions, ordering and review of laboratory studies, ordering and review of radiographic studies, pulse oximetry, re-evaluation of patient's condition and review of old charts.        Labs Reviewed   COMPREHENSIVE METABOLIC PANEL - Abnormal       Result Value    Sodium 137      Potassium 3.9      Chloride 105      CO2 24      Glucose 137 (*)     BUN 16      Creatinine 0.9      Calcium 9.2      Protein Total 6.6      Albumin 3.8      Bilirubin Total 0.5      ALP 78      AST 15      ALT 15      Anion Gap 8      eGFR >60     PROTIME-INR - Abnormal    PT 33.2 (*)     INR 3.2 (*)    CBC WITH DIFFERENTIAL - Abnormal    WBC 5.98      RBC 4.67      Hgb 15.1      Hct 46.2      MCV 99 (*)     MCH 32.3 (*)     MCHC 32.7      RDW 12.3      Platelet Count 164      MPV 10.8      Nucleated RBC 0      Neut % 56.7      Lymph % 24.7      Mono % 15.6 (*)     Eos % 2.0      Basophil % 0.7      Imm Grans % 0.3      Neut # 3.4      Lymph # 1.48      Mono # 0.93      Eos # 0.12      Baso # 0.04      Imm Grans # 0.02     MAGNESIUM - Normal    Magnesium 1.9     CBC W/ AUTO DIFFERENTIAL    Narrative:     The  following orders were created for panel order CBC auto differential.  Procedure                               Abnormality         Status                     ---------                               -----------         ------                     CBC with Differential[[id number]]       Abnormal            Final result                 Please view results for these tests on the individual orders.     EKG Readings: (Independently Interpreted)   Initial Reading: STEMI. Rhythm: Normal Sinus Rhythm. Ectopy: No Ectopy. Conduction: Normal. ST Segment Elevation: AVF, II and III. ST Segment Depression: AVL and V4. T Waves: Normal. Clinical Impression: Normal Sinus Rhythm   Inferior STEMI with reciprocal changes       Imaging Results              X-Ray Chest AP Portable (In process)                      Medications   LIDOcaine 2000 mg in D5W 250 mL infusion (has no administration in time range)   LIDOcaine (cardiac) injection 127 mg (has no administration in time range)   fentaNYL 50 mcg/mL injection (25 mcg Intravenous Given 4/30/25 0553)   LIDOcaine (PF) 10 mg/ml (1%) injection (5 mLs Subcutaneous Given 4/30/25 0550)   heparin (porcine) injection (2,000 Units Intravenous Given 4/30/25 0614)   ticagrelor tablet (180 mg Oral Given 4/30/25 0617)   heparin (porcine) injection 5,000 Units (5,000 Units Intravenous Given 4/30/25 0455)   morphine injection 4 mg (4 mg Intravenous Given 4/30/25 0501)   ondansetron injection 4 mg (4 mg Intravenous Given 4/30/25 0501)   morphine injection 2 mg (2 mg Intravenous Given 4/30/25 0515)     Medical Decision Making  Stuart Adler is a 64 y.o. male with a past medical history of CAD status post PCI that presents to the ED for evaluation of chest pain with EKG findings concerning for STEMI.  Initially, vitals were stable.  Had received 2 sprays of nitro prior to arrival.  Received aspirin with EMS.  EKG shows STEMI.  Cath lab activated.  While awaiting cath lab arrival, patient had 2 episodes of  ventricular fibrillation/torsade de pointes. He was briefly unresponsive.Chest compressions initiated and Defibrillated twice with return of normal sinus rhythm. Total down time between the two episodes was less than 2 minutes.  Given 2 g of magnesium.  GCS of 15 at this time.  Cath lab arrived and took patient for angiogram and possible PCI.    Amount and/or Complexity of Data Reviewed  Labs: ordered.  Radiology: ordered.    Risk  Prescription drug management.  Decision regarding hospitalization.                                      Clinical Impression:  Final diagnoses:  [R07.9] Chest pain  [I21.3] ST elevation myocardial infarction (STEMI), unspecified artery (Primary)          ED Disposition Condition    Admit                   Sanjeev Main MD  04/30/25 0695         [1]   Social History  Tobacco Use    Smoking status: Every Day     Current packs/day: 0.75     Average packs/day: 0.8 packs/day for 39.3 years (29.5 ttl pk-yrs)     Types: Cigarettes     Start date: 1986   Substance Use Topics    Alcohol use: Yes     Comment: 1/4 bottle whiskey every day    Drug use: Yes     Types: Marijuana        Sanjeev Main MD  04/30/25 5511

## 2025-04-30 NOTE — HPI
The patient is a 64-year-old male with past medical history of hypertension, hyperlipidemia, CAD status post stent ( PCI to mid circumflex in 2022), tobacco abuse who presented to the ED for the evaluation of chest pain.    The history is difficult to take from the patient as the patient is in significant pain.  In the ED, patient received 2 doses of nitro, 4 mg of IV morphine and then 2 additional mg of IV morphine and despite that patient complained of significant pain.  While in the ED, the patient went into torsades, CPR was started and patient had to be shocked 1 time.  The patient was given IV magnesium.

## 2025-04-30 NOTE — Clinical Note
The catheter was inserted into the and was inserted over the wire into the distal   right coronary artery. IVUS preformed

## 2025-04-30 NOTE — PROGRESS NOTES
Brief cardiology round:    Pt seen again this afternoon  Has no c/o chest pain  No recurrent VF noted  BB held d/t bradycardia  Pt has some back pain r/t bedrest  Right groin site is soft, no hematoma or bleeding present  Continue to obtain serial troponins until down trends  Echocardiogram done today, pending read  Cardiology will continue to closely follow           NELLY Brambila, DEJA-BC

## 2025-04-30 NOTE — NURSING
Patient arrived from cath lab at 0650 awake, alert, oriented, with right sheath in place.  VSS, NSR per EKG.  Order placed by Dr Pringle at 0532, prior to cath lab,  for a continuous Lidocaine infusion.  Patient was taken to cath lab after order placed, so clarification obtained by Dr Duarte on starting infusion.  Dr Duarte stated if patient was in NSR not to start infusion.

## 2025-04-30 NOTE — BRIEF OP NOTE
[facility]  Brief Operative Note  Cardiology    SUMMARY     Surgery Date: 4/30/2025     Surgeons and Role:     * Maria Luisa Barcenas MD - Primary    Assisting Surgeon: None    Pre-op Diagnosis:  ST elevation myocardial infarction (STEMI), unspecified artery [I21.3]    Post-op Diagnosis: Post-Op Diagnosis Codes:     * ST elevation myocardial infarction (STEMI), unspecified artery [I21.3]    Procedure Performed:  Coronary angiogram and IVUS guided PCI to distal RCA via right common femoral access site.     Procedure(s) (LRB):  Left heart cath (Left)  Percutaneous coronary intervention (N/A)  IVUS, Coronary    Technical Procedures Used: Coronary angiogram and IVUS guided PCI to distal RCA via right common femoral access site.     Operative Findings:  99% ISR of distal RCA for which IVUS guided PCI of the distal RCA was performed with a 3.5 x 20 BROOKE and post-dil with 3.75 x 12 NC balloon.  Of note, the right common femoral sheath was challenging to place due to scar tissue.  Unclear if we would be able to safely deploy angioseal given scar tissue; hence, sheath sutured in place.  Plan to remove sheath and apply manual pressure once ACT < 180.  INR noted to be elevated; however, patient denies taking anticoagulation.  Plan to recheck INR to ensure this is not a lab error.  Full cath report to follow.     Estimated Blood Loss: 20 cc          Specimens:   Specimen (24h ago, onward)      None

## 2025-04-30 NOTE — ASSESSMENT & PLAN NOTE
Patient's blood pressure range in the last 24 hours was: BP  Min: 114/73  Max: 157/83.The patient's inpatient anti-hypertensive regimen is listed below:  Current Antihypertensives  lisinopriL tablet 10 mg, Daily, Oral  metoprolol tartrate (LOPRESSOR) tablet 25 mg, 2 times daily, Oral    Plan  - BP is controlled, no changes needed to their regimen

## 2025-04-30 NOTE — PLAN OF CARE
Pt on NC @3L. Metoprolol held due to bradycardia. Rt femoral site/dressing clean, dry, intact, no hematoma. Educated on precautions for post procedure. Serial troponins. Limb immobilization ended at 1737. Urinal within reach. Instructed to call for assistance.     Problem: Wound  Goal: Skin Health and Integrity  Outcome: Progressing     Problem: Adult Inpatient Plan of Care  Goal: Plan of Care Review  Outcome: Progressing  Goal: Patient-Specific Goal (Individualized)  Outcome: Progressing  Goal: Absence of Hospital-Acquired Illness or Injury  Outcome: Progressing  Intervention: Prevent and Manage VTE (Venous Thromboembolism) Risk  Flowsheets (Taken 4/30/2025 1715)  VTE Prevention/Management:   bleeding precautions maintained   bleeding risk assessed   bleeding risk factor(s) identified, provider notified   fluids promoted  Goal: Optimal Comfort and Wellbeing  Outcome: Progressing  Goal: Readiness for Transition of Care  Outcome: Progressing     Problem: Infection  Goal: Absence of Infection Signs and Symptoms  Outcome: Progressing     Problem: Fall Injury Risk  Goal: Absence of Fall and Fall-Related Injury  Outcome: Progressing  Intervention: Identify and Manage Contributors  Flowsheets (Taken 4/30/2025 1717)  Medication Review/Management: medications reviewed     Problem: Cardiac Catheterization (Diagnostic/Interventional)  Goal: Absence of Bleeding  Outcome: Progressing  Goal: Stable Heart Rate and Rhythm  Outcome: Progressing  Goal: Absence of Embolism Signs and Symptoms  Outcome: Progressing  Goal: Anesthesia/Sedation Recovery  Outcome: Progressing  Goal: Optimal Pain Control and Function  Outcome: Progressing  Intervention: Prevent or Manage Pain  Flowsheets (Taken 4/30/2025 1717)  Diversional Activities: television  Pain Management Interventions: care clustered

## 2025-04-30 NOTE — Clinical Note
An angiography was performed of the right coronary arteries. The angiography was performed via power injection. The injected amount was 6 mL contrast at 3 mL/s.

## 2025-04-30 NOTE — ASSESSMENT & PLAN NOTE
Patient has prior history of coronary artery disease and acute myocardial infarction and prior history of coronary artery stent placement (PCI with mid circumflex, 2022).  See management of ST-elevation MI.

## 2025-04-30 NOTE — HOSPITAL COURSE
Patient was admitted to Hospital Medicine and emergently taken to cardiac catheterization lab where patient underwent a successful right distal right coronary artery PCI/drug-eluting stent deployment.  Patient tolerated procedure.  Chest pain resolved.  Cardiology team closely followed patient.  Medication compliance especially with Brilinta discussed with patient.  Patient encouraged to follow-up with dermatologist regarding surveillance of basal cell carcinoma of nose.

## 2025-04-30 NOTE — PHARMACY MED REC
"Admission Medication History     The home medication history was taken by Morteza Vasques.    You may go to "Admission" then "Reconcile Home Medications" tabs to review and/or act upon these items.     The home medication list has been updated by the Pharmacy department.   Please read ALL comments highlighted in yellow.   Please address this information as you see fit.    Feel free to contact us if you have any questions or require assistance.      The medications listed below were removed from the home medication list. Please reorder if appropriate:  Patient reports no longer taking the following medication(s):  Bactrim DS    Medications listed below were obtained from: Patient/family and Analytic software- [employer]  No current facility-administered medications on file prior to encounter.     Current Outpatient Medications on File Prior to Encounter   Medication Sig Dispense Refill    amLODIPine (NORVASC) 5 MG tablet Take 1 tablet (5 mg total) by mouth once daily. 90 tablet 0    lisinopriL 10 MG tablet Take 1 tablet (10 mg total) by mouth once daily. 90 tablet 0    metoprolol tartrate (LOPRESSOR) 25 MG tablet Take 1 tablet (25 mg total) by mouth 2 (two) times daily. 180 tablet 0    nitroGLYCERIN (NITROSTAT) 0.4 MG SL tablet Place 1 tablet (0.4 mg total) under the tongue every 5 (five) minutes as needed for Chest pain. 30 tablet 1    polyethylene glycol (GLYCOLAX) 17 gram PwPk Take 17 g by mouth once daily. (Patient taking differently: Take 17 g by mouth every other day.) 30 packet 0    aspirin 81 MG Chew CHEW AND SWALLOW 1 TABLET(81 MG) BY MOUTH EVERY DAY (Patient taking differently: Take 81 mg by mouth once daily.) 90 tablet 0    HYDROcodone-acetaminophen (NORCO) 5-325 mg per tablet Take 1 tablet by mouth every 6 (six) hours as needed for Pain. (Patient not taking: Reported on 4/30/2025) 12 tablet 0    oxyCODONE-acetaminophen (PERCOCET)  mg per tablet Take 1 tablet by mouth every 6 (six) hours as needed for " Pain. (Patient not taking: Reported on 4/30/2025) 20 tablet 0    [DISCONTINUED] atorvastatin (LIPITOR) 80 MG tablet Take 1 tablet (80 mg total) by mouth once daily. (Patient not taking: Reported on 4/30/2025) 90 tablet 0    [DISCONTINUED] sulfamethoxazole-trimethoprim 800-160mg (BACTRIM DS) 800-160 mg Tab Take 1 tablet by mouth once daily. 7 tablet 0           Morteza Vasques  EXT 1921                .

## 2025-04-30 NOTE — PROGRESS NOTES
[facility]  Progress Note    Patient Name: Stuart Adler  MRN: [id number]  Patient Class: IP- Inpatient   Admission Date: 4/30/2025  Length of Stay: 0 days  Attending Physician: Cassius Duarte MD  Primary Care Provider: No primary care provider on file.        Subjective     Principal Problem:STEMI (ST elevation myocardial infarction)        HPI:  The patient is a 64-year-old male with past medical history of hypertension, hyperlipidemia, CAD status post stent ( PCI to mid circumflex in 2022), tobacco abuse who presented to the ED for the evaluation of chest pain.    The history is difficult to take from the patient as the patient is in significant pain.  In the ED, patient received 2 doses of nitro, 4 mg of IV morphine and then 2 additional mg of IV morphine and despite that patient complained of significant pain.  While in the ED, the patient went into torsades, CPR was started and patient had to be shocked 1 time.  The patient was given IV magnesium.    Overview/Hospital Course:  No notes on file    Interval History:  Patient is seen and examined during multidisciplinary rounds.  Patient underwent successful left heart catheterization for STEMI were 99% distal RCA PCI/drug-eluting stent placement performed.  Patient tolerated procedure well.  Patient denies any chest pain, palpitations or shortness of breath.    Review of Systems   Constitutional:  Negative for chills and fever.   HENT:  Negative for congestion and sore throat.    Eyes:  Negative for visual disturbance.   Respiratory:  Negative for cough and shortness of breath.    Cardiovascular:  Negative for chest pain and palpitations.   Gastrointestinal:  Negative for abdominal pain, constipation, diarrhea, nausea and vomiting.   Endocrine: Negative for cold intolerance and heat intolerance.   Genitourinary:  Negative for dysuria and hematuria.   Musculoskeletal:  Negative for arthralgias and myalgias.   Skin:  Negative for  rash.   Neurological:  Negative for tremors and seizures.   Hematological:  Negative for adenopathy. Does not bruise/bleed easily.     Objective:     Vital Signs (Most Recent):  Temp: 97.7 °F (36.5 °C) (04/30/25 0712)  Pulse: 63 (04/30/25 0805)  Resp: 16 (04/30/25 0805)  BP: 118/64 (04/30/25 0745)  SpO2: 99 % (04/30/25 0805) Vital Signs (24h Range):  Temp:  [97.5 °F (36.4 °C)-97.7 °F (36.5 °C)] 97.7 °F (36.5 °C)  Pulse:  [47-74] 63  Resp:  [14-27] 16  SpO2:  [87 %-99 %] 99 %  BP: (114-157)/(64-90) 118/64  Arterial Line BP: (143-147)/(74-75) 147/74     Weight: 127.6 kg (281 lb 4.9 oz)  Body mass index is 35.16 kg/m².    Intake/Output Summary (Last 24 hours) at 4/30/2025 0819  Last data filed at 4/30/2025 0727  Gross per 24 hour   Intake --   Output 30 ml   Net -30 ml         Physical Exam  Vitals and nursing note reviewed.   Constitutional:       Appearance: Normal appearance. He is well-developed.   HENT:      Head: Normocephalic and atraumatic.      Nose: Nose normal. No septal deviation.   Eyes:      Conjunctiva/sclera: Conjunctivae normal.      Pupils: Pupils are equal, round, and reactive to light.   Neck:      Thyroid: No thyroid mass.      Vascular: No JVD.      Trachea: No tracheal tenderness or tracheal deviation.   Cardiovascular:      Rate and Rhythm: Normal rate and regular rhythm.      Heart sounds: S1 normal and S2 normal. No murmur heard.     No friction rub. No gallop.   Pulmonary:      Effort: Pulmonary effort is normal.      Breath sounds: Normal breath sounds. No decreased breath sounds, wheezing, rhonchi or rales.   Abdominal:      General: Bowel sounds are normal. There is no distension.      Palpations: Abdomen is soft. There is no hepatomegaly, splenomegaly or mass.      Tenderness: There is no abdominal tenderness.   Musculoskeletal:      Comments: Right groin sheath without any hematoma is in place.   Skin:     General: Skin is warm.      Findings: No rash.   Neurological:      General: No  "focal deficit present.      Mental Status: He is alert and oriented to person, place, and time.      Cranial Nerves: No cranial nerve deficit.      Sensory: No sensory deficit.   Psychiatric:         Mood and Affect: Mood normal.         Behavior: Behavior normal.               Significant Labs: All pertinent labs within the past 24 hours have been reviewed.  CBC:   Recent Labs   Lab 04/30/25 0458   WBC 5.98   HGB 15.1   HCT 46.2        CMP:   Recent Labs   Lab 04/30/25 0458      K 3.9      CO2 24   *   BUN 16   CREATININE 0.9   CALCIUM 9.2   PROT 6.6   ALBUMIN 3.8   BILITOT 0.5   ALKPHOS 78   AST 15   ALT 15   ANIONGAP 8     Coagulation:   Recent Labs   Lab 04/30/25 0458   INR 3.2*     Lactic Acid: No results for input(s): "LACTATE" in the last 48 hours.  Troponin: No results for input(s): "TROPONINI", "TROPONINIHS" in the last 48 hours.  TSH: No results for input(s): "TSH" in the last 4320 hours.  Urine Studies: No results for input(s): "COLORU", "APPEARANCEUA", "PHUR", "SPECGRAV", "PROTEINUA", "GLUCUA", "KETONESU", "BILIRUBINUA", "OCCULTUA", "NITRITE", "UROBILINOGEN", "LEUKOCYTESUR", "RBCUA", "WBCUA", "BACTERIA", "SQUAMEPITHEL", "HYALINECASTS" in the last 48 hours.    Invalid input(s): "WRIGHTSUR"  Microbiology Results (last 7 days)       ** No results found for the last 168 hours. **          Significant Imaging:   ECHO: Pending.    LHC: 99% ISR of distal RCA for which IVUS guided PCI of the distal RCA was performed with a 3.5 x 20 BROOKE and post-dil with 3.75 x 12 NC balloon. Of note, the right common femoral sheath was challenging to place due to scar tissue. Unclear if we would be able to safely deploy angioseal given scar tissue; hence, sheath sutured in place. Plan to remove sheath and apply manual pressure once ACT < 180. INR noted to be elevated; however, patient denies taking anticoagulation. Plan to recheck INR to ensure this is not a lab error. Full cath report to follow. "     CXR: No acute pulmonary process.       Assessment & Plan  STEMI (ST elevation myocardial infarction)  Continue tele monitoring.  Follow Cardiology recommendations.  Post left heart catheterization precautions.  Follow 2D echocardiogram.  Trend serial cardiac enzymes.  Continue aspirin, Brilinta, statin therapy and current cardiac medications.    Smoker  Smoking cessation counseling performed. Dangers of cigarette smoking were reviewed with patient in detail and patient was encouraged to quit. Nicotine replacement options were discussed for > 3 minutes.      Alcohol use  Monitor for any signs and symptoms for delirium tremens.  Alcohol abstinence counseling performed.    CAD (coronary artery disease)  Patient has prior history of coronary artery disease and acute myocardial infarction and prior history of coronary artery stent placement (PCI with mid circumflex, 2022).  See management of ST-elevation MI.  Severe obesity (BMI 35.0-39.9) with comorbidity  Body mass index is 35.16 kg/m². Morbid obesity complicates all aspects of disease management from diagnostic modalities to treatment. Weight loss encouraged and health benefits explained to patient.       Hypertension  Patient's blood pressure range in the last 24 hours was: BP  Min: 114/73  Max: 157/83.The patient's inpatient anti-hypertensive regimen is listed below:  Current Antihypertensives  lisinopriL tablet 10 mg, Daily, Oral  metoprolol tartrate (LOPRESSOR) tablet 25 mg, 2 times daily, Oral    Plan  - BP is controlled, no changes needed to their regimen      VTE Risk Mitigation (From admission, onward)           Ordered     IP VTE HIGH RISK PATIENT  Once         04/30/25 0619     Place sequential compression device  Until discontinued         04/30/25 0619                    Discharge Planning   CLARKE: 5/2/2025     Code Status: Full Code   Medical Readiness for Discharge Date:                Critical care time spent on the evaluation and treatment of  severe organ dysfunction, review of pertinent labs and imaging studies, discussions with consulting providers and discussions with patient/family: 35 minutes.            Cassius Duarte MD  Department of Hospital Medicine   [facility]

## 2025-04-30 NOTE — PLAN OF CARE
[facility]  Initial Discharge Assessment    CM met with pt to discuss discharge planning. Pt reports that he lives with a friend and her 3 kids in a SSH with no steps. He is independent in care, own CG. States he has to take it slow but is able to ambulate without issue with DME. No PCP.  Pt has no transportation and girl he lives with also does not have a car. No HH/Dialysis/POA. Order placed for TCC at home, outpt CM, and primary care. Will continue to follow.    Primary Care Provider: No primary care provider on file.    Admission Diagnosis: ST elevation myocardial infarction (STEMI), unspecified artery [I21.3]    Admission Date: 4/30/2025  Expected Discharge Date: 5/2/2025    Transition of Care Barriers: None    Payor: MEDICARE / Plan: MEDICARE PART A & B / Product Type: Government /     Extended Emergency Contact Information  Primary Emergency Contact: oswaldo abdi  Mobile Phone: [phone]  Relation: Daughter  Secondary Emergency Contact: velma freire  Mobile Phone: [phone]  Relation: Friend   needed? No    Discharge Plan A: Home with family  Discharge Plan B: [facility] [address]  [address]  [place] [address]  Phone: [phone] Fax: [phone]    [id number] - [place], [place] - [address]  [address]  [address] [facility] [address]  Phone: [phone] Fax: [phone]      Initial Assessment (most recent)       Adult Discharge Assessment - 04/30/25 1311          Discharge Assessment    Assessment Type Discharge Planning Assessment     Confirmed/corrected address, phone number and insurance Yes     Confirmed Demographics Correct on Facesheet     Source of Information patient     When was your last doctors appointment? --   No PCP    Communicated CLARKE with patient/caregiver Yes     Reason For Admission STEMI     People in Home friend(s)     Do you expect to return to your  current living situation? Yes     Do you have help at home or someone to help you manage your care at home? No     Prior to hospitilization cognitive status: Unable to Assess     Current cognitive status: Alert/Oriented     Walking or Climbing Stairs Difficulty yes     Walking or Climbing Stairs ambulation difficulty, requires equipment     Mobility Management Uses RW     Dressing/Bathing Difficulty no     Home Layout Other (see comments)   [facility] [place] no steps    Equipment Currently Used at Home walker, rolling     Readmission within 30 days? Yes     Patient currently being followed by outpatient case management? No     Do you currently have service(s) that help you manage your care at home? No     Do you take prescription medications? Yes     Do you have prescription coverage? Yes     Coverage He is unsure. [facility] [place] [facility] coverage but only has Medicare     Do you have any problems affording any of your prescribed medications? TBD     Who is going to help you get home at discharge? His daughter Najma or will need a ride     How do you get to doctors appointments? other (see comments)   No transportation    Are you on dialysis? No     Do you take coumadin? No     Discharge Plan A Home with family     Discharge Plan B Home Health     DME Needed Upon Discharge  none     Discharge Plan discussed with: Patient     Transition of Care Barriers None

## 2025-04-30 NOTE — PLAN OF CARE
Notified pt on services with [facility] and met again with pt. He states that they saw him about 3 times and stopped but he would like to resume with them at discharge as he found it helpful       04/30/25 5155   Discharge Reassessment   Assessment Type Discharge Planning Reassessment   Did the patient's condition or plan change since previous assessment? Yes   Discharge Plan discussed with: Patient

## 2025-04-30 NOTE — SEDATION DOCUMENTATION
Pre-sedation Assessment:    1. ASA Score: ASA 4 - Patient with severe systemic disease that is a constant threat to life  2. Mallampati Class: III (soft and hard palate and base of uvula visible)  3. Patient or family history of any reaction to anesthesia or sedation: Yes, No  4. Plan for Sedation: Moderate  5. H&P within 30 days of the procedure and updated within 24 hrs of admission or registration: Yes

## 2025-04-30 NOTE — CONSULTS
[facility] - Emergency Dept  Department of Cardiology  Consult Note      PATIENT NAME: Stuart Adler    MRN: [id number]  TODAY'S DATE: 04/30/2025  ADMIT DATE: 4/30/2025                          CONSULT REQUESTED BY: Sanjeev Main,*    SUBJECTIVE     PRINCIPAL PROBLEM: STEMI    HPI: 64 year old male with a medical history significant for CAD who presented to the ED with chest pain. Patient found to have ST elevations.  Patient given heparin 5000 units bolus and ASA 325mg po x 1.  Cath lab activated by ED team.           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    Kidney stones 04/2025    Right eye injury     removed due to skin CA    Skin cancer     Smoker      Past Surgical History:   Procedure Laterality Date    ANGIOGRAM, CORONARY, WITH LEFT HEART CATHETERIZATION N/A 1/28/2022    Procedure: Angiogram, Coronary, with Left Heart Cath;  Surgeon: Nacho Orellana MD;  Location: [facility] CATH/EP LAB;  Service: Cardiology;  Laterality: N/A;    CYSTOSCOPY N/A 4/10/2025    Procedure: CYSTOSCOPY;  Surgeon: Kaleb Gaffney MD;  Location: [facility] OR;  Service: Urology;  Laterality: N/A;    CYSTOSCOPY W/ URETERAL STENT PLACEMENT Right 3/27/2025    Procedure: CYSTOSCOPY, WITH URETERAL STENT INSERTION;  Surgeon: Kaleb Gaffney MD;  Location: [facility] OR;  Service: Urology;  Laterality: Right;    CYSTOSCOPY W/ URETERAL STENT REMOVAL Bilateral 4/10/2025    Procedure: CYSTOSCOPY, WITH URETERAL STENT REMOVAL;  Surgeon: Kaleb Gaffney MD;  Location: [facility] OR;  Service: Urology;  Laterality: Bilateral;    FACIAL COSMETIC SURGERY      PERCUTANEOUS CORONARY INTERVENTION (PCI) FOR CHRONIC TOTAL OCCLUSION OF CORONARY ARTERY      URETEROSCOPIC REMOVAL OF  "URETERIC CALCULUS Left 4/10/2025    Procedure: REMOVAL, CALCULUS, URETER, URETEROSCOPIC;  Surgeon: Kaleb Gaffney MD;  Location: [facility] OR;  Service: Urology;  Laterality: Left;    URETEROSCOPY, WITH LASER LITHOTRIPSY Left 4/10/2025    Procedure: URETEROSCOPY, WITH LASER LITHOTRIPSY;  Surgeon: Kaleb Gaffney MD;  Location: [facility] OR;  Service: Urology;  Laterality: Left;     Social History[1]     REVIEW OF SYSTEMS  Per HPI    OBJECTIVE     VITAL SIGNS (Most Recent)  Temp: 97.5 °F (36.4 °C) (04/30/25 0453)  Pulse: (!) 53 (04/30/25 0453)  Resp: 19 (04/30/25 0517)  BP: (!) 146/90 (04/30/25 0453)  SpO2: 98 % (04/30/25 0455)    VENTILATION STATUS  Resp: 19 (04/30/25 0517)  SpO2: 98 % (04/30/25 0455)           I & O (Last 24H):No intake or output data in the 24 hours ending 04/30/25 0529    WEIGHTS  Wt Readings from Last 1 Encounters:   04/30/25 0501 127 kg (280 lb)       PHYSICAL EXAM  CONSTITUTIONAL: Ill appearing.   HEENT: Normocephalic, atraumatic,pupils reactive to light                 NECK:  No JVD no carotid bruit  CVS: S1S2+, RRR  LUNGS: Clear  ABDOMEN: Soft, NT, BS+  EXTREMITIES: No cyanosis, edema  : No calero catheter  NEURO: AAO X 3      HOME MEDICATIONS:Medications Ordered Prior to Encounter[2]    SCHEDULED MEDS:    CONTINUOUS INFUSIONS:    PRN MEDS:    LABS AND DIAGNOSTICS     CBC LAST 3 DAYS  Recent Labs   Lab 04/30/25 0458   WBC 5.98   RBC 4.67   HGB 15.1   HCT 46.2   MCV 99*   MCH 32.3*   MCHC 32.7   RDW 12.3      MPV 10.8   NRBC 0       COAGULATION LAST 3 DAYS  Recent Labs   Lab 04/30/25 0458   INR 3.2*       CHEMISTRY LAST 3 DAYS  No results for input(s): "NA", "K", "CL", "CO2", "ANIONGAP", "BUN", "CREATININE", "GLU", "CALCIUM", "PH", "MG", "ALBUMIN", "PROT", "ALKPHOS", "ALT", "AST", "BILITOT" in the last 168 hours.    CARDIAC PROFILE LAST 3 DAYS  No results for input(s): "BNP", "CPK", "CPKMB", "LDH", "TROPONINI", "TROPONINIHS" in the last 168 hours.    ENDOCRINE LAST 3 DAYS  No " "results for input(s): "TSH", "PROCAL" in the last 168 hours.    LAST ARTERIAL BLOOD GAS  ABG  No results for input(s): "PH", "PO2", "PCO2", "HCO3", "BE" in the last 168 hours.    LAST 7 DAYS MICROBIOLOGY   Microbiology Results (last 7 days)       ** No results found for the last 168 hours. **            MOST RECENT IMAGING  FL Flouro Usage  See Notes    This procedure was auto-finalized.  X-Ray KUB  Narrative: CLINICAL HISTORY:  ([id number])63 y/o  (1960) M    left ureteral stone; Encounter for other preprocedural examination    TECHNIQUE:  (A#[id number], exam time 4/10/2025 9:41)    XR KUB [id number]    COMPARISON:  03/31/2025    FINDINGS:  There are bilateral double pigtail ureteral stents in stable position.  There is a stable 7 mm calcification overlying the distal left ureteral stent    There are no renal stones.    Normal bowel gas pattern.    Mild degenerative changes of the spine.    Lung bases are clear.  Impression: Bilateral double pigtail ureteral stents in stable configuration and position    Stable distal left ureteral stone measuring 7 mm    Electronically signed by: Erika Gillette  Date:    04/10/2025  Time:    09:58  X-Ray Chest PA And Lateral  Narrative: EXAMINATION:  XR CHEST PA AND LATERAL    CLINICAL HISTORY:  pre op  left side stone;  Encounter for other preprocedural examination    FINDINGS:  PA and lateral chest is compared to 04/08/2023 shows normal cardiomediastinal silhouette.    Lungs are clear. Pulmonary vasculature is normal. No acute osseous abnormality.  Impression: No acute pulmonary process    Electronically signed by: Erika Gillette  Date:    04/10/2025  Time:    09:43      ECHOCARDIOGRAM RESULTS (last 5)  Results for orders placed during the hospital encounter of 04/08/23    Echo    Interpretation Summary  · The left ventricle is normal in size with concentric remodeling and normal systolic function.  · The estimated ejection fraction is 60%.  · Normal left ventricular " diastolic function.  · Normal right ventricular size with normal right ventricular systolic function.  · Normal central venous pressure (3 mmHg).      Results for orders placed during the hospital encounter of 01/26/21    Echo Color Flow Doppler? Yes    Interpretation Summary  · The left ventricle is normal in size with concentric remodeling and normal systolic function. The estimated ejection fraction is 70%  · Indeterminate left ventricular diastolic function.  · Normal right ventricular size with normal right ventricular systolic function.  · The aortic root is mildly dilated.  · There is mild pulmonary hypertension.      CURRENT/PREVIOUS VISIT EKG  Results for orders placed or performed during the hospital encounter of 03/27/25   EKG 12-lead    Collection Time: 03/27/25 10:21 AM   Result Value Ref Range    QRS Duration 88 ms    [facility] QTC Calculation 469 ms    Narrative    Test Reason : Z01.818,    Vent. Rate :  68 BPM     Atrial Rate :  68 BPM     P-R Int : 140 ms          QRS Dur :  88 ms      QT Int : 442 ms       P-R-T Axes :  62  48  60 degrees    QTcB Int : 469 ms    Normal sinus rhythm  Normal ECG  Confirmed by Mauricio Galarza ([id number]) on 3/27/2025 7:38:00 PM    Referred By: AAAREFERRAL SELF           Confirmed By: Mauricio Galarza           ASSESSMENT/PLAN:     There are no active hospital problems to display for this patient.      ASSESSMENT & PLAN:   STEMI  - Cath lab activated. Plan for emergent coronary angiogram.  Rest of plan to follow after angiogram.       Maria Luisa Barcenas MD  Date of Service: 04/30/2025  5:29 AM       [1]   Social History  Tobacco Use    Smoking status: Every Day     Current packs/day: 0.75     Average packs/day: 0.8 packs/day for 39.3 years (29.5 ttl pk-yrs)     Types: Cigarettes     Start date: 1986   Substance Use Topics    Alcohol use: Yes     Comment: 1/4 bottle whiskey every day    Drug use: Yes     Types: Marijuana   [2]   No current facility-administered medications  on file prior to encounter.     Current Outpatient Medications on File Prior to Encounter   Medication Sig Dispense Refill    amLODIPine (NORVASC) 5 MG tablet Take 1 tablet (5 mg total) by mouth once daily. 90 tablet 0    aspirin 81 MG Chew CHEW AND SWALLOW 1 TABLET(81 MG) BY MOUTH EVERY DAY 90 tablet 0    atorvastatin (LIPITOR) 80 MG tablet Take 1 tablet (80 mg total) by mouth once daily. 90 tablet 0    HYDROcodone-acetaminophen (NORCO) 5-325 mg per tablet Take 1 tablet by mouth every 6 (six) hours as needed for Pain. 12 tablet 0    lisinopriL 10 MG tablet Take 1 tablet (10 mg total) by mouth once daily. 90 tablet 0    metoprolol tartrate (LOPRESSOR) 25 MG tablet Take 1 tablet (25 mg total) by mouth 2 (two) times daily. 180 tablet 0    nitroGLYCERIN (NITROSTAT) 0.4 MG SL tablet Place 1 tablet (0.4 mg total) under the tongue every 5 (five) minutes as needed for Chest pain. 30 tablet 1    oxyCODONE-acetaminophen (PERCOCET)  mg per tablet Take 1 tablet by mouth every 6 (six) hours as needed for Pain. 20 tablet 0    polyethylene glycol (GLYCOLAX) 17 gram PwPk Take 17 g by mouth once daily. 30 packet 0    sulfamethoxazole-trimethoprim 800-160mg (BACTRIM DS) 800-160 mg Tab Take 1 tablet by mouth once daily. 7 tablet 0

## 2025-04-30 NOTE — SUBJECTIVE & OBJECTIVE
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    Kidney stones 04/2025    Right eye injury     removed due to skin CA    Skin cancer     Smoker        Past Surgical History:   Procedure Laterality Date    ANGIOGRAM, CORONARY, WITH LEFT HEART CATHETERIZATION N/A 1/28/2022    Procedure: Angiogram, Coronary, with Left Heart Cath;  Surgeon: Nacho Orellana MD;  Location: [facility] CATH/EP LAB;  Service: Cardiology;  Laterality: N/A;    CYSTOSCOPY N/A 4/10/2025    Procedure: CYSTOSCOPY;  Surgeon: Kaleb Gaffney MD;  Location: [facility] OR;  Service: Urology;  Laterality: N/A;    CYSTOSCOPY W/ URETERAL STENT PLACEMENT Right 3/27/2025    Procedure: CYSTOSCOPY, WITH URETERAL STENT INSERTION;  Surgeon: Kaleb Gaffney MD;  Location: [facility] OR;  Service: Urology;  Laterality: Right;    CYSTOSCOPY W/ URETERAL STENT REMOVAL Bilateral 4/10/2025    Procedure: CYSTOSCOPY, WITH URETERAL STENT REMOVAL;  Surgeon: Kaleb Gaffney MD;  Location: [facility] OR;  Service: Urology;  Laterality: Bilateral;    FACIAL COSMETIC SURGERY      PERCUTANEOUS CORONARY INTERVENTION (PCI) FOR CHRONIC TOTAL OCCLUSION OF CORONARY ARTERY      URETEROSCOPIC REMOVAL OF URETERIC CALCULUS Left 4/10/2025    Procedure: REMOVAL, CALCULUS, URETER, URETEROSCOPIC;  Surgeon: Kaleb Gaffney MD;  Location: [facility] OR;  Service: Urology;  Laterality: Left;    URETEROSCOPY, WITH LASER LITHOTRIPSY Left 4/10/2025    Procedure: URETEROSCOPY, WITH LASER LITHOTRIPSY;  Surgeon: Kaleb Gaffney MD;  Location: [facility];  Service: Urology;  Laterality: Left;       Review of patient's allergies indicates:  No Known Allergies    No current facility-administered medications on file prior to encounter.     Current Outpatient Medications on File Prior to Encounter   Medication Sig    amLODIPine (NORVASC) 5 MG tablet Take 1 tablet (5 mg  total) by mouth once daily.    aspirin 81 MG Chew CHEW AND SWALLOW 1 TABLET(81 MG) BY MOUTH EVERY DAY    atorvastatin (LIPITOR) 80 MG tablet Take 1 tablet (80 mg total) by mouth once daily.    HYDROcodone-acetaminophen (NORCO) 5-325 mg per tablet Take 1 tablet by mouth every 6 (six) hours as needed for Pain.    lisinopriL 10 MG tablet Take 1 tablet (10 mg total) by mouth once daily.    metoprolol tartrate (LOPRESSOR) 25 MG tablet Take 1 tablet (25 mg total) by mouth 2 (two) times daily.    nitroGLYCERIN (NITROSTAT) 0.4 MG SL tablet Place 1 tablet (0.4 mg total) under the tongue every 5 (five) minutes as needed for Chest pain.    oxyCODONE-acetaminophen (PERCOCET)  mg per tablet Take 1 tablet by mouth every 6 (six) hours as needed for Pain.    polyethylene glycol (GLYCOLAX) 17 gram PwPk Take 17 g by mouth once daily.    sulfamethoxazole-trimethoprim 800-160mg (BACTRIM DS) 800-160 mg Tab Take 1 tablet by mouth once daily.     Family History       Problem Relation (Age of Onset)    Heart disease Father          Tobacco Use    Smoking status: Every Day     Current packs/day: 0.75     Average packs/day: 0.8 packs/day for 39.3 years (29.5 ttl pk-yrs)     Types: Cigarettes     Start date: 1986    Smokeless tobacco: Not on file   Substance and Sexual Activity    Alcohol use: Yes     Comment: 1/4 bottle whiskey every day    Drug use: Yes     Types: Marijuana    Sexual activity: Not on file     Review of Systems      Constitutional:  Negative for fever, chills, generalized weakness, appetite change  HENT:  Negative for congestion, sore throat  Eyes:  Negative for redness, discharge  Respiratory:  Negative for cough and shortness of breath  Cardiovascular:  Positive for chest pain  Gastrointestinal:  Negative for abdominal pain, nausea, vomiting, diarrhea  Genitourinary:  Negative for flank pain, difficulty urination  Musculoskeletal:  Negative for arthralgia, myalgia  Skin:  Negative for color change  Neuro:  Negative  for dizziness, focal weakness  Psychiatric:  Negative for agitation, confusion      Objective:     Vital Signs (Most Recent):  Temp: 97.5 °F (36.4 °C) (04/30/25 0453)  Pulse: 63 (04/30/25 0530)  Resp: 14 (04/30/25 0530)  BP: 114/73 (04/30/25 0530)  SpO2: 98 % (04/30/25 0530) Vital Signs (24h Range):  Temp:  [97.5 °F (36.4 °C)] 97.5 °F (36.4 °C)  Pulse:  [47-74] 63  Resp:  [14-24] 14  SpO2:  [93 %-98 %] 98 %  BP: (114-146)/(73-90) 114/73     Weight: 127 kg (280 lb)  Body mass index is 35 kg/m².     Physical Exam        General:  Awake, alert, in significant distress due to pain   Head:  NC/AT  HEENT:  PERRLA, EOMI, no pallor or icterus               Oral mucosa moist without exudates or erythema  Neck:  Supple, no JVD, no lymphadenopathy  Chest:  S1-S2 normal, no M/G/R  Respiratory:  Normal vesicular breath sounds with no added sounds  Abdomen:  Soft, nondistended, nontender, no organomegaly, bowel sounds present  Extremities:  No pitting edema of bilateral lower extremities present  Neuro:  Awake, alert, oriented x3, grossly intact motor and sensory exam  Psychiatric:  Normal mood and affect     Significant Labs: All pertinent labs within the past 24 hours have been reviewed.  Recent Lab Results         04/30/25 0458        Albumin 3.8       ALP 78       ALT 15       Anion Gap 8       AST 15       Baso # 0.04       Basophil % 0.7       BILIRUBIN TOTAL 0.5  Comment: For infants and newborns, interpretation of results should be based   on gestational age, weight and in agreement with clinical   observations.    Premature Infant recommended reference ranges:   0-24 hours:  <8.0 mg/dL   24-48 hours: <12.0 mg/dL   3-5 days:    <15.0 mg/dL   6-29 days:   <15.0 mg/dL       BNP 49  Comment: Values of less than 100 pg/ml are consistent with non-CHF populations.       BUN 16       Calcium 9.2       Chloride 105       CO2 24       Creatinine 0.9       eGFR >60       Eos # 0.12       Eos % 2.0       Glucose 137        Hematocrit 46.2       Hemoglobin 15.1       Immature Grans (Abs) 0.02  Comment: Mild elevation in immature granulocytes is non specific and can be seen in a variety of conditions including stress response, acute inflammation, trauma and pregnancy. Correlation with other laboratory and clinical findings is essential.       Immature Granulocytes 0.3       INR 3.2  Comment: Coumadin Therapy:    2.0 - 3.0 for INR for all indicators except mechanical heart valves    and antiphospholipid syndromes which should use 2.5 - 3.5.       Lymph # 1.48       LYMPH % 24.7       Magnesium  1.9       MCH 32.3       MCHC 32.7       MCV 99       Mono # 0.93       Mono % 15.6       MPV 10.8       Neut # 3.4       Neut % 56.7       nRBC 0       Platelet Count 164       Potassium 3.9       PROTEIN TOTAL 6.6       PT 33.2       RBC 4.67       RDW 12.3       Sodium 137       Troponin I High Sensitivity 41.4  Comment: Troponin results differ between methods. Do not use   results between Troponin methods interchangeably.    Alkaline Phospatase levels above 400 U/L may   cause false positive results.    Access hsTnI should not be used for patients taking   Asfotase danii (Strensiq).       WBC 5.98               Significant Imaging: I have reviewed all pertinent imaging results/findings within the past 24 hours.  I have reviewed and interpreted all pertinent imaging results/findings within the past 24 hours.

## 2025-04-30 NOTE — ASSESSMENT & PLAN NOTE
Continue tele monitoring.  Follow Cardiology recommendations.  Post left heart catheterization precautions.  Follow 2D echocardiogram.  Trend serial cardiac enzymes.  Continue aspirin, Brilinta, statin therapy and current cardiac medications.

## 2025-04-30 NOTE — PLAN OF CARE
04/30/25 0805   PRE-TX-O2   Device (Oxygen Therapy) nasal cannula   $ Is the patient on Low Flow Oxygen? Yes   Flow (L/min) (Oxygen Therapy) 3   SpO2 99 %   Pulse Oximetry Type Continuous   $ Pulse Oximetry - Multiple Charge Pulse Oximetry - Multiple   Pulse 63   Resp 16   Education   $ Education Oxygen;15 min   Tobacco Cessation Intervention   Do you use any type of tobacco product? Yes   Are you interested in quitting use of tobacco products? Not interested   Respiratory Evaluation   $ Care Plan Tech Time 15 min   $ Respiratory Evaluation Complete   Evaluation For New Orders   Admitting Diagnosis chest pain   Cardiac Diagnosis STEMI   Pulmonary Diagnosis COPD   Current Surgeries na   Home Oxygen   Has Home Oxygen? No   Home Aerosol, MDI, DPI, and Other Treatments/Therapies   Home Respiratory Therapy Per Patient/Review of Chart No   Oxygen Care Plan   Oxygen Care Plan Per Protocol   SPO2 Goal (%) 95% cardiac   Rationale Shortness of Breath;Post-op recovery   Bronchodilator Care Plan   Rationale No Rationale found   Atelectasis Care Plan   Rationale No Rational Found   Airway Clearance Care Plan   Rationale No rationale found

## 2025-04-30 NOTE — H&P
[facility] Medicine  History & Physical    Patient Name: Stuart Adler  MRN: [id number]  Patient Class: Emergency  Admission Date: 4/30/2025  Attending Physician: Pino Bradley  Primary Care Provider: No primary care provider on file.         Patient information was obtained from patient and ER records.     Subjective:     Principal Problem:<principal problem not specified>    Chief Complaint:   Chief Complaint   Patient presents with    Chest Pain     C/o CP radiating down lt arm for approx 45min. Took 2 nitro at home, feels worse than it did when had cardiac stents placed in the past.         HPI: The patient is a 64-year-old male with past medical history of hypertension, hyperlipidemia, CAD status post stent ( PCI to mid circumflex in 2022), tobacco abuse who presented to the ED for the evaluation of chest pain.    The history is difficult to take from the patient as the patient is in significant pain.  In the ED, patient received 2 doses of nitro, 4 mg of IV morphine and then 2 additional mg of IV morphine and despite that patient complained of significant pain.  While in the ED, the patient went into torsades, CPR was started and patient had to be shocked 1 time.  The patient was given IV magnesium.    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    Kidney stones 04/2025    Right eye injury     removed due to skin CA    Skin cancer     Smoker        Past Surgical History:   Procedure Laterality Date    ANGIOGRAM, CORONARY, WITH LEFT HEART CATHETERIZATION N/A 1/28/2022    Procedure: Angiogram, Coronary, with Left Heart Cath;  Surgeon: Nacho Orellana MD;  Location: [facility] CATH/EP LAB;  Service: Cardiology;  Laterality: N/A;    CYSTOSCOPY N/A 4/10/2025    Procedure: CYSTOSCOPY;  Surgeon: Kaleb Gaffney MD;  Location: [facility] OR;  Service:  Urology;  Laterality: N/A;    CYSTOSCOPY W/ URETERAL STENT PLACEMENT Right 3/27/2025    Procedure: CYSTOSCOPY, WITH URETERAL STENT INSERTION;  Surgeon: Kaleb Gaffney MD;  Location: [facility] OR;  Service: Urology;  Laterality: Right;    CYSTOSCOPY W/ URETERAL STENT REMOVAL Bilateral 4/10/2025    Procedure: CYSTOSCOPY, WITH URETERAL STENT REMOVAL;  Surgeon: Kaleb Gaffney MD;  Location: [facility] OR;  Service: Urology;  Laterality: Bilateral;    FACIAL COSMETIC SURGERY      PERCUTANEOUS CORONARY INTERVENTION (PCI) FOR CHRONIC TOTAL OCCLUSION OF CORONARY ARTERY      URETEROSCOPIC REMOVAL OF URETERIC CALCULUS Left 4/10/2025    Procedure: REMOVAL, CALCULUS, URETER, URETEROSCOPIC;  Surgeon: Kaleb Gaffney MD;  Location: [facility] OR;  Service: Urology;  Laterality: Left;    URETEROSCOPY, WITH LASER LITHOTRIPSY Left 4/10/2025    Procedure: URETEROSCOPY, WITH LASER LITHOTRIPSY;  Surgeon: Kaleb Gaffney MD;  Location: [facility];  Service: Urology;  Laterality: Left;       Review of patient's allergies indicates:  No Known Allergies    No current facility-administered medications on file prior to encounter.     Current Outpatient Medications on File Prior to Encounter   Medication Sig    amLODIPine (NORVASC) 5 MG tablet Take 1 tablet (5 mg total) by mouth once daily.    aspirin 81 MG Chew CHEW AND SWALLOW 1 TABLET(81 MG) BY MOUTH EVERY DAY    atorvastatin (LIPITOR) 80 MG tablet Take 1 tablet (80 mg total) by mouth once daily.    HYDROcodone-acetaminophen (NORCO) 5-325 mg per tablet Take 1 tablet by mouth every 6 (six) hours as needed for Pain.    lisinopriL 10 MG tablet Take 1 tablet (10 mg total) by mouth once daily.    metoprolol tartrate (LOPRESSOR) 25 MG tablet Take 1 tablet (25 mg total) by mouth 2 (two) times daily.    nitroGLYCERIN (NITROSTAT) 0.4 MG SL tablet Place 1 tablet (0.4 mg total) under the tongue every 5 (five) minutes as needed for Chest pain.    oxyCODONE-acetaminophen (PERCOCET)  mg per  tablet Take 1 tablet by mouth every 6 (six) hours as needed for Pain.    polyethylene glycol (GLYCOLAX) 17 gram PwPk Take 17 g by mouth once daily.    sulfamethoxazole-trimethoprim 800-160mg (BACTRIM DS) 800-160 mg Tab Take 1 tablet by mouth once daily.     Family History       Problem Relation (Age of Onset)    Heart disease Father          Tobacco Use    Smoking status: Every Day     Current packs/day: 0.75     Average packs/day: 0.8 packs/day for 39.3 years (29.5 ttl pk-yrs)     Types: Cigarettes     Start date: 1986    Smokeless tobacco: Not on file   Substance and Sexual Activity    Alcohol use: Yes     Comment: 1/4 bottle whiskey every day    Drug use: Yes     Types: Marijuana    Sexual activity: Not on file     Review of Systems      Constitutional:  Negative for fever, chills, generalized weakness, appetite change  HENT:  Negative for congestion, sore throat  Eyes:  Negative for redness, discharge  Respiratory:  Negative for cough and shortness of breath  Cardiovascular:  Positive for chest pain  Gastrointestinal:  Negative for abdominal pain, nausea, vomiting, diarrhea  Genitourinary:  Negative for flank pain, difficulty urination  Musculoskeletal:  Negative for arthralgia, myalgia  Skin:  Negative for color change  Neuro:  Negative for dizziness, focal weakness  Psychiatric:  Negative for agitation, confusion      Objective:     Vital Signs (Most Recent):  Temp: 97.5 °F (36.4 °C) (04/30/25 0453)  Pulse: 63 (04/30/25 0530)  Resp: 14 (04/30/25 0530)  BP: 114/73 (04/30/25 0530)  SpO2: 98 % (04/30/25 0530) Vital Signs (24h Range):  Temp:  [97.5 °F (36.4 °C)] 97.5 °F (36.4 °C)  Pulse:  [47-74] 63  Resp:  [14-24] 14  SpO2:  [93 %-98 %] 98 %  BP: (114-146)/(73-90) 114/73     Weight: 127 kg (280 lb)  Body mass index is 35 kg/m².     Physical Exam        General:  Awake, alert, in significant distress due to pain   Head:  NC/AT  HEENT:  PERRLA, EOMI, no pallor or icterus               Oral mucosa moist without  exudates or erythema  Neck:  Supple, no JVD, no lymphadenopathy  Chest:  S1-S2 normal, no M/G/R  Respiratory:  Normal vesicular breath sounds with no added sounds  Abdomen:  Soft, nondistended, nontender, no organomegaly, bowel sounds present  Extremities:  No pitting edema of bilateral lower extremities present  Neuro:  Awake, alert, oriented x3, grossly intact motor and sensory exam  Psychiatric:  Normal mood and affect     Significant Labs: All pertinent labs within the past 24 hours have been reviewed.  Recent Lab Results         04/30/25  0458        Albumin 3.8       ALP 78       ALT 15       Anion Gap 8       AST 15       Baso # 0.04       Basophil % 0.7       BILIRUBIN TOTAL 0.5  Comment: For infants and newborns, interpretation of results should be based   on gestational age, weight and in agreement with clinical   observations.    Premature Infant recommended reference ranges:   0-24 hours:  <8.0 mg/dL   24-48 hours: <12.0 mg/dL   3-5 days:    <15.0 mg/dL   6-29 days:   <15.0 mg/dL       BNP 49  Comment: Values of less than 100 pg/ml are consistent with non-CHF populations.       BUN 16       Calcium 9.2       Chloride 105       CO2 24       Creatinine 0.9       eGFR >60       Eos # 0.12       Eos % 2.0       Glucose 137       Hematocrit 46.2       Hemoglobin 15.1       Immature Grans (Abs) 0.02  Comment: Mild elevation in immature granulocytes is non specific and can be seen in a variety of conditions including stress response, acute inflammation, trauma and pregnancy. Correlation with other laboratory and clinical findings is essential.       Immature Granulocytes 0.3       INR 3.2  Comment: Coumadin Therapy:    2.0 - 3.0 for INR for all indicators except mechanical heart valves    and antiphospholipid syndromes which should use 2.5 - 3.5.       Lymph # 1.48       LYMPH % 24.7       Magnesium  1.9       MCH 32.3       MCHC 32.7       MCV 99       Mono # 0.93       Mono % 15.6       MPV 10.8       Neut #  3.4       Neut % 56.7       nRBC 0       Platelet Count 164       Potassium 3.9       PROTEIN TOTAL 6.6       PT 33.2       RBC 4.67       RDW 12.3       Sodium 137       Troponin I High Sensitivity 41.4  Comment: Troponin results differ between methods. Do not use   results between Troponin methods interchangeably.    Alkaline Phospatase levels above 400 U/L may   cause false positive results.    Access hsTnI should not be used for patients taking   Asfotase danii (Strensiq).       WBC 5.98               Significant Imaging: I have reviewed all pertinent imaging results/findings within the past 24 hours.  I have reviewed and interpreted all pertinent imaging results/findings within the past 24 hours.  Assessment/Plan:     Assessment & Plan  STEMI (ST elevation myocardial infarction)        # Inferoposterior wall STEMI:  EKG shows ST elevation in II, III, AVF and ST depression in V1 V2, V3  s/p 324 mg of aspirin and 5000 units of heparin bolus  patient went into polymorphic V-tach, was given IV magnesium and was shocked, lidocaine ordered.  cath team activated, follow up cardiology recommendations    # polymorphic V-tach:  s/p CPR and 200 J defibrillation 1 time  lidocaine ordered  cath team activated    # CAD:  Continue aspirin, Lipitor    # supratherapeutic INR:   INR of 3.2, patient is not on any anticoagulants at home, only received 5000 units of heparin.  will repeat PT/INR    # Hypertension:  Continue lisinopril 10 mg daily, metoprolol 25 mg bid    # GERD: Protonix    # Obesity class 2: BMI 35.0    # Code: Full code  # Diet: Adult cardiac diet  # DVT prophylaxis: SCD, repeat PT/INR                   Kirk Pringle MD  Department of Hospital Medicine  [facility]

## 2025-04-30 NOTE — ASSESSMENT & PLAN NOTE
Monitor for any signs and symptoms for delirium tremens.  Alcohol abstinence counseling performed.

## 2025-04-30 NOTE — SUBJECTIVE & OBJECTIVE
Interval History:  Patient is seen and examined during multidisciplinary rounds.  Patient underwent successful left heart catheterization for STEMI were 99% distal RCA PCI/drug-eluting stent placement performed.  Patient tolerated procedure well.  Patient denies any chest pain, palpitations or shortness of breath.    Review of Systems   Constitutional:  Negative for chills and fever.   HENT:  Negative for congestion and sore throat.    Eyes:  Negative for visual disturbance.   Respiratory:  Negative for cough and shortness of breath.    Cardiovascular:  Negative for chest pain and palpitations.   Gastrointestinal:  Negative for abdominal pain, constipation, diarrhea, nausea and vomiting.   Endocrine: Negative for cold intolerance and heat intolerance.   Genitourinary:  Negative for dysuria and hematuria.   Musculoskeletal:  Negative for arthralgias and myalgias.   Skin:  Negative for rash.   Neurological:  Negative for tremors and seizures.   Hematological:  Negative for adenopathy. Does not bruise/bleed easily.     Objective:     Vital Signs (Most Recent):  Temp: 97.7 °F (36.5 °C) (04/30/25 0712)  Pulse: 63 (04/30/25 0805)  Resp: 16 (04/30/25 0805)  BP: 118/64 (04/30/25 0745)  SpO2: 99 % (04/30/25 0805) Vital Signs (24h Range):  Temp:  [97.5 °F (36.4 °C)-97.7 °F (36.5 °C)] 97.7 °F (36.5 °C)  Pulse:  [47-74] 63  Resp:  [14-27] 16  SpO2:  [87 %-99 %] 99 %  BP: (114-157)/(64-90) 118/64  Arterial Line BP: (143-147)/(74-75) 147/74     Weight: 127.6 kg (281 lb 4.9 oz)  Body mass index is 35.16 kg/m².    Intake/Output Summary (Last 24 hours) at 4/30/2025 0819  Last data filed at 4/30/2025 0772  Gross per 24 hour   Intake --   Output 30 ml   Net -30 ml         Physical Exam  Vitals and nursing note reviewed.   Constitutional:       Appearance: Normal appearance. He is well-developed.   HENT:      Head: Normocephalic and atraumatic.      Nose: Nose normal. No septal deviation.   Eyes:      Conjunctiva/sclera: Conjunctivae  "normal.      Pupils: Pupils are equal, round, and reactive to light.   Neck:      Thyroid: No thyroid mass.      Vascular: No JVD.      Trachea: No tracheal tenderness or tracheal deviation.   Cardiovascular:      Rate and Rhythm: Normal rate and regular rhythm.      Heart sounds: S1 normal and S2 normal. No murmur heard.     No friction rub. No gallop.   Pulmonary:      Effort: Pulmonary effort is normal.      Breath sounds: Normal breath sounds. No decreased breath sounds, wheezing, rhonchi or rales.   Abdominal:      General: Bowel sounds are normal. There is no distension.      Palpations: Abdomen is soft. There is no hepatomegaly, splenomegaly or mass.      Tenderness: There is no abdominal tenderness.   Musculoskeletal:      Comments: Right groin sheath without any hematoma is in place.   Skin:     General: Skin is warm.      Findings: No rash.   Neurological:      General: No focal deficit present.      Mental Status: He is alert and oriented to person, place, and time.      Cranial Nerves: No cranial nerve deficit.      Sensory: No sensory deficit.   Psychiatric:         Mood and Affect: Mood normal.         Behavior: Behavior normal.               Significant Labs: All pertinent labs within the past 24 hours have been reviewed.  CBC:   Recent Labs   Lab 04/30/25  0458   WBC 5.98   HGB 15.1   HCT 46.2        CMP:   Recent Labs   Lab 04/30/25  0458      K 3.9      CO2 24   *   BUN 16   CREATININE 0.9   CALCIUM 9.2   PROT 6.6   ALBUMIN 3.8   BILITOT 0.5   ALKPHOS 78   AST 15   ALT 15   ANIONGAP 8     Coagulation:   Recent Labs   Lab 04/30/25  0458   INR 3.2*     Lactic Acid: No results for input(s): "LACTATE" in the last 48 hours.  Troponin: No results for input(s): "TROPONINI", "TROPONINIHS" in the last 48 hours.  TSH: No results for input(s): "TSH" in the last 4320 hours.  Urine Studies: No results for input(s): "COLORU", "APPEARANCEUA", "PHUR", "SPECGRAV", "PROTEINUA", "GLUCUA", " ""KETONESU", "BILIRUBINUA", "OCCULTUA", "NITRITE", "UROBILINOGEN", "LEUKOCYTESUR", "RBCUA", "WBCUA", "BACTERIA", "SQUAMEPITHEL", "HYALINECASTS" in the last 48 hours.    Invalid input(s): "WRIGHTSUR"  Microbiology Results (last 7 days)       ** No results found for the last 168 hours. **          Significant Imaging:   ECHO: Pending.    LHC: 99% ISR of distal RCA for which IVUS guided PCI of the distal RCA was performed with a 3.5 x 20 BROOKE and post-dil with 3.75 x 12 NC balloon. Of note, the right common femoral sheath was challenging to place due to scar tissue. Unclear if we would be able to safely deploy angioseal given scar tissue; hence, sheath sutured in place. Plan to remove sheath and apply manual pressure once ACT < 180. INR noted to be elevated; however, patient denies taking anticoagulation. Plan to recheck INR to ensure this is not a lab error. Full cath report to follow.     CXR: No acute pulmonary process.   "

## 2025-05-01 ENCOUNTER — TELEPHONE (OUTPATIENT)
Dept: HOME HEALTH SERVICES | Facility: CLINIC | Age: 65
End: 2025-05-01
Payer: MEDICARE

## 2025-05-01 LAB
ABSOLUTE EOSINOPHIL (SMH): 0.09 K/UL
ABSOLUTE MONOCYTE (SMH): 0.62 K/UL (ref 0.3–1)
ABSOLUTE NEUTROPHIL COUNT (SMH): 4.7 K/UL (ref 1.8–7.7)
ALBUMIN SERPL-MCNC: 3.5 G/DL (ref 3.5–5.2)
ALP SERPL-CCNC: 72 UNIT/L (ref 55–135)
ALT SERPL-CCNC: 29 UNIT/L (ref 10–44)
ANION GAP (SMH): 4 MMOL/L (ref 8–16)
AST SERPL-CCNC: 80 UNIT/L (ref 10–40)
BASOPHILS # BLD AUTO: 0.03 K/UL
BASOPHILS NFR BLD AUTO: 0.5 %
BILIRUB SERPL-MCNC: 0.6 MG/DL (ref 0.1–1)
BUN SERPL-MCNC: 11 MG/DL (ref 8–23)
CALCIUM SERPL-MCNC: 8.5 MG/DL (ref 8.7–10.5)
CHLORIDE SERPL-SCNC: 103 MMOL/L (ref 95–110)
CHOLEST SERPL-MCNC: 170 MG/DL (ref 120–199)
CHOLEST/HDLC SERPL: 4.7 {RATIO} (ref 2–5)
CO2 SERPL-SCNC: 29 MMOL/L (ref 23–29)
CREAT SERPL-MCNC: 0.7 MG/DL (ref 0.5–1.4)
ERYTHROCYTE [DISTWIDTH] IN BLOOD BY AUTOMATED COUNT: 12.1 % (ref 11.5–14.5)
GFR SERPLBLD CREATININE-BSD FMLA CKD-EPI: >60 ML/MIN/1.73/M2
GLUCOSE SERPL-MCNC: 105 MG/DL (ref 70–110)
HCT VFR BLD AUTO: 44.4 % (ref 40–54)
HDLC SERPL-MCNC: 36 MG/DL (ref 40–75)
HDLC SERPL: 21.2 % (ref 20–50)
HGB BLD-MCNC: 14.7 GM/DL (ref 14–18)
IMM GRANULOCYTES # BLD AUTO: 0.04 K/UL (ref 0–0.04)
IMM GRANULOCYTES NFR BLD AUTO: 0.7 % (ref 0–0.5)
LDLC SERPL CALC-MCNC: 104 MG/DL (ref 63–159)
LYMPHOCYTES # BLD AUTO: 0.7 K/UL (ref 1–4.8)
MAGNESIUM SERPL-MCNC: 2 MG/DL (ref 1.6–2.6)
MCH RBC QN AUTO: 32.6 PG (ref 27–31)
MCHC RBC AUTO-ENTMCNC: 33.1 G/DL (ref 32–36)
MCV RBC AUTO: 98 FL (ref 82–98)
NONHDLC SERPL-MCNC: 134 MG/DL
NUCLEATED RBC (/100WBC) (SMH): 0 /100 WBC
PLATELET # BLD AUTO: 129 K/UL (ref 150–450)
PMV BLD AUTO: 11.1 FL (ref 9.2–12.9)
POCT GLUCOSE: 96 MG/DL (ref 70–110)
POTASSIUM SERPL-SCNC: 4.2 MMOL/L (ref 3.5–5.1)
PROT SERPL-MCNC: 6.2 GM/DL (ref 6–8.4)
RBC # BLD AUTO: 4.51 M/UL (ref 4.6–6.2)
RELATIVE EOSINOPHIL (SMH): 1.5 % (ref 0–8)
RELATIVE LYMPHOCYTE (SMH): 11.4 % (ref 18–48)
RELATIVE MONOCYTE (SMH): 10.1 % (ref 4–15)
RELATIVE NEUTROPHIL (SMH): 75.8 % (ref 38–73)
SODIUM SERPL-SCNC: 136 MMOL/L (ref 136–145)
TRIGL SERPL-MCNC: 150 MG/DL (ref 30–150)
TROPONIN HIGH SENSITIVE (SMH): ABNORMAL PG/ML
WBC # BLD AUTO: 6.13 K/UL (ref 3.9–12.7)

## 2025-05-01 PROCEDURE — 63600175 PHARM REV CODE 636 W HCPCS: Performed by: STUDENT IN AN ORGANIZED HEALTH CARE EDUCATION/TRAINING PROGRAM

## 2025-05-01 PROCEDURE — 36415 COLL VENOUS BLD VENIPUNCTURE: CPT | Performed by: INTERNAL MEDICINE

## 2025-05-01 PROCEDURE — 27000221 HC OXYGEN, UP TO 24 HOURS

## 2025-05-01 PROCEDURE — 99233 SBSQ HOSP IP/OBS HIGH 50: CPT | Mod: ,,, | Performed by: INTERNAL MEDICINE

## 2025-05-01 PROCEDURE — 99900035 HC TECH TIME PER 15 MIN (STAT)

## 2025-05-01 PROCEDURE — 80061 LIPID PANEL: CPT | Performed by: INTERNAL MEDICINE

## 2025-05-01 PROCEDURE — 36415 COLL VENOUS BLD VENIPUNCTURE: CPT | Performed by: STUDENT IN AN ORGANIZED HEALTH CARE EDUCATION/TRAINING PROGRAM

## 2025-05-01 PROCEDURE — 94761 N-INVAS EAR/PLS OXIMETRY MLT: CPT

## 2025-05-01 PROCEDURE — 20000000 HC ICU ROOM

## 2025-05-01 PROCEDURE — 25000003 PHARM REV CODE 250: Performed by: STUDENT IN AN ORGANIZED HEALTH CARE EDUCATION/TRAINING PROGRAM

## 2025-05-01 PROCEDURE — 94618 PULMONARY STRESS TESTING: CPT

## 2025-05-01 PROCEDURE — 84484 ASSAY OF TROPONIN QUANT: CPT | Performed by: STUDENT IN AN ORGANIZED HEALTH CARE EDUCATION/TRAINING PROGRAM

## 2025-05-01 PROCEDURE — 99900031 HC PATIENT EDUCATION (STAT)

## 2025-05-01 PROCEDURE — 85025 COMPLETE CBC W/AUTO DIFF WBC: CPT | Performed by: STUDENT IN AN ORGANIZED HEALTH CARE EDUCATION/TRAINING PROGRAM

## 2025-05-01 PROCEDURE — 25000003 PHARM REV CODE 250: Performed by: INTERNAL MEDICINE

## 2025-05-01 PROCEDURE — 83735 ASSAY OF MAGNESIUM: CPT | Performed by: STUDENT IN AN ORGANIZED HEALTH CARE EDUCATION/TRAINING PROGRAM

## 2025-05-01 PROCEDURE — 80053 COMPREHEN METABOLIC PANEL: CPT | Performed by: STUDENT IN AN ORGANIZED HEALTH CARE EDUCATION/TRAINING PROGRAM

## 2025-05-01 RX ORDER — FLUTICASONE PROPIONATE 50 MCG
2 SPRAY, SUSPENSION (ML) NASAL 2 TIMES DAILY
Status: DISCONTINUED | OUTPATIENT
Start: 2025-05-01 | End: 2025-05-03 | Stop reason: HOSPADM

## 2025-05-01 RX ADMIN — MUPIROCIN 1 G: 20 OINTMENT TOPICAL at 09:05

## 2025-05-01 RX ADMIN — MUPIROCIN 1 G: 20 OINTMENT TOPICAL at 08:05

## 2025-05-01 RX ADMIN — Medication 3 MG: at 08:05

## 2025-05-01 RX ADMIN — ATORVASTATIN CALCIUM 80 MG: 40 TABLET, FILM COATED ORAL at 09:05

## 2025-05-01 RX ADMIN — MORPHINE SULFATE 2 MG: 2 INJECTION, SOLUTION INTRAMUSCULAR; INTRAVENOUS at 05:05

## 2025-05-01 RX ADMIN — ASPIRIN 81 MG: 81 TABLET, COATED ORAL at 09:05

## 2025-05-01 RX ADMIN — TICAGRELOR 90 MG: 90 TABLET ORAL at 09:05

## 2025-05-01 RX ADMIN — LISINOPRIL 10 MG: 10 TABLET ORAL at 09:05

## 2025-05-01 RX ADMIN — TICAGRELOR 90 MG: 90 TABLET ORAL at 08:05

## 2025-05-01 RX ADMIN — PANTOPRAZOLE SODIUM 40 MG: 40 TABLET, DELAYED RELEASE ORAL at 05:05

## 2025-05-01 RX ADMIN — MORPHINE SULFATE 2 MG: 2 INJECTION, SOLUTION INTRAMUSCULAR; INTRAVENOUS at 01:05

## 2025-05-01 NOTE — RESPIRATORY THERAPY
05/01/25 1116   Home Oxygen Qualification   $ Home O2 Qualification Pulmonary Stress Test/6 min walk;Tech time 15 minutes   Room Air SpO2 At Rest 95 %   Room Air SpO2 During Ambulation 96 %   Heart Rate on O2 89 bpm   SpO2 Post Ambulation 97 %   Post Ambulation Heart Rate 91 bpm   Home O2 Eval Comments patient does not oxygen

## 2025-05-01 NOTE — SUBJECTIVE & OBJECTIVE
Interval History:  Patient is seen and examined during multidisciplinary rounds.  S/p left heart catheterization for STEMI were 99% distal RCA PCI/drug-eluting stent placement performed.  Patient tolerated procedure well.  Patient denies any chest pain, palpitations or shortness of breath.    Review of Systems   Constitutional:  Negative for chills and fever.   HENT:  Negative for congestion and sore throat.    Eyes:  Negative for visual disturbance.   Respiratory:  Negative for cough and shortness of breath.    Cardiovascular:  Negative for chest pain and palpitations.   Gastrointestinal:  Negative for abdominal pain, constipation, diarrhea, nausea and vomiting.   Endocrine: Negative for cold intolerance and heat intolerance.   Genitourinary:  Negative for dysuria and hematuria.   Musculoskeletal:  Negative for arthralgias and myalgias.   Skin:  Negative for rash.   Neurological:  Negative for tremors and seizures.   Hematological:  Negative for adenopathy. Does not bruise/bleed easily.     Objective:     Vital Signs (Most Recent):  Temp: 98 °F (36.7 °C) (05/01/25 0430)  Pulse: 61 (05/01/25 0753)  Resp: 20 (05/01/25 0753)  BP: 123/60 (04/30/25 1929)  SpO2: 98 % (05/01/25 0753) Vital Signs (24h Range):  Temp:  [97.3 °F (36.3 °C)-98 °F (36.7 °C)] 98 °F (36.7 °C)  Pulse:  [51-61] 61  Resp:  [13-34] 20  SpO2:  [95 %-99 %] 98 %  BP: (123-155)/(60-94) 123/60  Arterial Line BP: (124-157)/() 124/120     Weight: 133.3 kg (293 lb 14 oz)  Body mass index is 36.73 kg/m².    Intake/Output Summary (Last 24 hours) at 5/1/2025 0840  Last data filed at 5/1/2025 0413  Gross per 24 hour   Intake 803.75 ml   Output 2650 ml   Net -1846.25 ml         Physical Exam  Vitals and nursing note reviewed.   Constitutional:       Appearance: Normal appearance. He is well-developed.   HENT:      Head: Normocephalic and atraumatic.      Nose: Nose normal. No septal deviation.   Eyes:      Conjunctiva/sclera: Conjunctivae normal.       "Pupils: Pupils are equal, round, and reactive to light.   Neck:      Thyroid: No thyroid mass.      Vascular: No JVD.      Trachea: No tracheal tenderness or tracheal deviation.   Cardiovascular:      Rate and Rhythm: Normal rate and regular rhythm.      Heart sounds: S1 normal and S2 normal. No murmur heard.     No friction rub. No gallop.   Pulmonary:      Effort: Pulmonary effort is normal.      Breath sounds: Normal breath sounds. No decreased breath sounds, wheezing, rhonchi or rales.   Abdominal:      General: Bowel sounds are normal. There is no distension.      Palpations: Abdomen is soft. There is no hepatomegaly, splenomegaly or mass.      Tenderness: There is no abdominal tenderness.   Musculoskeletal:      Comments: Right groin without hematoma.   Skin:     General: Skin is warm.      Findings: No rash.   Neurological:      General: No focal deficit present.      Mental Status: He is alert and oriented to person, place, and time.      Cranial Nerves: No cranial nerve deficit.      Sensory: No sensory deficit.   Psychiatric:         Mood and Affect: Mood normal.         Behavior: Behavior normal.               Significant Labs: All pertinent labs within the past 24 hours have been reviewed.  CBC:   Recent Labs   Lab 04/30/25  0458 05/01/25  0301   WBC 5.98 6.13   HGB 15.1 14.7   HCT 46.2 44.4    129*     CMP:   Recent Labs   Lab 04/30/25  0458 05/01/25  0301    136   K 3.9 4.2    103   CO2 24 29   * 105   BUN 16 11   CREATININE 0.9 0.7   CALCIUM 9.2 8.5*   PROT 6.6 6.2   ALBUMIN 3.8 3.5   BILITOT 0.5 0.6   ALKPHOS 78 72   AST 15 80*   ALT 15 29   ANIONGAP 8 4*     Coagulation:   Recent Labs   Lab 04/30/25  0736   INR 1.1     Lactic Acid: No results for input(s): "LACTATE" in the last 48 hours.  Troponin: No results for input(s): "TROPONINI", "TROPONINIHS" in the last 48 hours.  TSH: No results for input(s): "TSH" in the last 4320 hours.  Urine Studies: No results for input(s): " ""COLORU", "APPEARANCEUA", "PHUR", "SPECGRAV", "PROTEINUA", "GLUCUA", "KETONESU", "BILIRUBINUA", "OCCULTUA", "NITRITE", "UROBILINOGEN", "LEUKOCYTESUR", "RBCUA", "WBCUA", "BACTERIA", "SQUAMEPITHEL", "HYALINECASTS" in the last 48 hours.    Invalid input(s): "WRIGHTSUR"  Microbiology Results (last 7 days)       ** No results found for the last 168 hours. **          Significant Imaging:   ECHO:     Left Ventricle: Left ventricle was not well visualized due to poor sonic window. The left ventricle is normal in size. Normal wall thickness. There is normal systolic function with a visually estimated ejection fraction of 55 - 60%. There is normal diastolic function. E/A ratio is 1.11.    Right Ventricle: The right ventricle is normal in size.    Left Atrium: Mildly dilated    Aortic Valve: There is moderate aortic valve sclerosis. Mildly calcified cusps.    Mitral Valve: Not well visualized due to poor acoustic window. There is mild to moderate regurgitation.    Tricuspid Valve: Not well visualized due to poor acoustic window.    LHC: 99% ISR of distal RCA for which IVUS guided PCI of the distal RCA was performed with a 3.5 x 20 BROOKE and post-dil with 3.75 x 12 NC balloon. Of note, the right common femoral sheath was challenging to place due to scar tissue. Unclear if we would be able to safely deploy angioseal given scar tissue; hence, sheath sutured in place. Plan to remove sheath and apply manual pressure once ACT < 180. INR noted to be elevated; however, patient denies taking anticoagulation. Plan to recheck INR to ensure this is not a lab error. Full cath report to follow.     CXR: No acute pulmonary process.   "

## 2025-05-01 NOTE — PROGRESS NOTES
[facility]  Department of Cardiology  Progress Note      PATIENT NAME: Stuart Adler    MRN: [id number]  TODAY'S DATE: 05/01/2025  ADMIT DATE: 4/30/2025                          CONSULT REQUESTED BY: Cassius Duarte MD    SUBJECTIVE     PRINCIPAL PROBLEM: STEMI    HPI: 64 year old male with a medical history significant for CAD who presented to the ED with chest pain. Patient found to have ST elevations.  Patient given heparin 5000 units bolus and ASA 325mg po x 1.  Cath lab activated by ED team.         INTERVAL HISTORY:  05/01/2025  Patient has tenderness to left lateral ribs from compressions.  Creatinine 0.7.  Vital signs are stable.  No ectopy on telemetry.  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    Kidney stones 04/2025    Right eye injury     removed due to skin CA    Skin cancer     Smoker      Past Surgical History:   Procedure Laterality Date    ANGIOGRAM, CORONARY, WITH LEFT HEART CATHETERIZATION N/A 1/28/2022    Procedure: Angiogram, Coronary, with Left Heart Cath;  Surgeon: Nacho Orellana MD;  Location: [facility] CATH/EP LAB;  Service: Cardiology;  Laterality: N/A;    CYSTOSCOPY N/A 4/10/2025    Procedure: CYSTOSCOPY;  Surgeon: Kaleb Gaffney MD;  Location: [facility] OR;  Service: Urology;  Laterality: N/A;    CYSTOSCOPY W/ URETERAL STENT PLACEMENT Right 3/27/2025    Procedure: CYSTOSCOPY, WITH URETERAL STENT INSERTION;  Surgeon: Kaleb Gaffney MD;  Location: [facility] OR;  Service: Urology;  Laterality: Right;    CYSTOSCOPY W/ URETERAL STENT REMOVAL Bilateral 4/10/2025    Procedure: CYSTOSCOPY, WITH URETERAL STENT REMOVAL;  Surgeon: Kaleb Gaffney MD;  Location: [facility] OR;  Service: Urology;   Laterality: Bilateral;    FACIAL COSMETIC SURGERY      PERCUTANEOUS CORONARY INTERVENTION (PCI) FOR CHRONIC TOTAL OCCLUSION OF CORONARY ARTERY      URETEROSCOPIC REMOVAL OF URETERIC CALCULUS Left 4/10/2025    Procedure: REMOVAL, CALCULUS, URETER, URETEROSCOPIC;  Surgeon: Kaleb Gaffney MD;  Location: [facility];  Service: Urology;  Laterality: Left;    URETEROSCOPY, WITH LASER LITHOTRIPSY Left 4/10/2025    Procedure: URETEROSCOPY, WITH LASER LITHOTRIPSY;  Surgeon: Kaleb Gaffney MD;  Location: [facility];  Service: Urology;  Laterality: Left;     Social History[1]     REVIEW OF SYSTEMS  Per HPI    OBJECTIVE     VITAL SIGNS (Most Recent)  Temp: 98 °F (36.7 °C) (05/01/25 0430)  Pulse: 61 (05/01/25 0753)  Resp: 20 (05/01/25 1336)  BP: 123/60 (04/30/25 1929)  SpO2: 98 % (05/01/25 0753)    VENTILATION STATUS  Resp: 20 (05/01/25 1336)  SpO2: 98 % (05/01/25 0753)           I & O (Last 24H):  Intake/Output Summary (Last 24 hours) at 5/1/2025 1627  Last data filed at 5/1/2025 0413  Gross per 24 hour   Intake 1.25 ml   Output 1425 ml   Net -1423.75 ml       WEIGHTS  Wt Readings from Last 1 Encounters:   04/30/25 1107 133.3 kg (293 lb 14 oz)   04/30/25 0700 127.6 kg (281 lb 4.9 oz)   04/30/25 0501 127 kg (280 lb)       PHYSICAL EXAM  CONSTITUTIONAL:  Resting comfortably in bed  HEENT:  Left lateral nose lesion;    Postsurgical scars on the right side of the face with right-sided enucleation           NECK:  No JVD no carotid bruit  CVS: S1S2+, RRR  LUNGS: Clear  ABDOMEN: Soft, NT, BS+  EXTREMITIES:  Right groin site is soft, no hematoma or bleeding present  : No calero catheter  NEURO: AAO X 3      HOME MEDICATIONS:Medications Ordered Prior to Encounter[2]    SCHEDULED MEDS:   aspirin  81 mg Oral Daily    atorvastatin  80 mg Oral Daily    fluticasone propionate  2 spray Each Nostril BID    LIDOcaine (cardiac)  1 mg/kg Intravenous Once    lisinopriL  10 mg Oral Daily    mupirocin   Nasal BID    pantoprazole  40 mg Oral  "Daily    ticagrelor  90 mg Oral BID       CONTINUOUS INFUSIONS:    PRN MEDS:  Current Facility-Administered Medications:     acetaminophen, 650 mg, Oral, Q4H PRN    aluminum-magnesium hydroxide-simethicone, 30 mL, Oral, QID PRN    dextrose 50%, 12.5 g, Intravenous, PRN    dextrose 50%, 25 g, Intravenous, PRN    glucagon (human recombinant), 1 mg, Intramuscular, PRN    glucose, 16 g, Oral, PRN    glucose, 24 g, Oral, PRN    magnesium oxide, 800 mg, Oral, PRN    magnesium oxide, 800 mg, Oral, PRN    melatonin, 3 mg, Oral, Nightly PRN    morphine, 2 mg, Intravenous, Q4H PRN    naloxone, 0.02 mg, Intravenous, PRN    ondansetron, 4 mg, Intravenous, Q6H PRN    polyethylene glycol, 17 g, Oral, Daily PRN    potassium bicarbonate, 35 mEq, Oral, PRN    potassium bicarbonate, 50 mEq, Oral, PRN    potassium bicarbonate, 60 mEq, Oral, PRN    potassium, sodium phosphates, 2 packet, Oral, PRN    potassium, sodium phosphates, 2 packet, Oral, PRN    potassium, sodium phosphates, 2 packet, Oral, PRN    sodium chloride 0.9%, 10 mL, Intravenous, PRN    LABS AND DIAGNOSTICS     CBC LAST 3 DAYS  Recent Labs   Lab 04/30/25  0458 05/01/25  0301   WBC 5.98 6.13   RBC 4.67 4.51*   HGB 15.1 14.7   HCT 46.2 44.4   MCV 99* 98   MCH 32.3* 32.6*   MCHC 32.7 33.1   RDW 12.3 12.1    129*   MPV 10.8 11.1   NRBC 0 0       COAGULATION LAST 3 DAYS  Recent Labs   Lab 04/30/25  0458 04/30/25  0736   INR 3.2* 1.1       CHEMISTRY LAST 3 DAYS  Recent Labs   Lab 04/30/25  0458 05/01/25  0301    136   K 3.9 4.2    103   CO2 24 29   ANIONGAP 8 4*   BUN 16 11   CREATININE 0.9 0.7   * 105   CALCIUM 9.2 8.5*   MG 1.9 2.0   ALBUMIN 3.8 3.5   PROT 6.6 6.2   ALKPHOS 78 72   ALT 15 29   AST 15 80*   BILITOT 0.5 0.6       CARDIAC PROFILE LAST 3 DAYS  Recent Labs   Lab 04/30/25  0458   BNP 49       ENDOCRINE LAST 3 DAYS  No results for input(s): "TSH", "PROCAL" in the last 168 hours.    LAST ARTERIAL BLOOD GAS  ABG  No results for input(s): " ""PH", "PO2", "PCO2", "HCO3", "BE" in the last 168 hours.    LAST 7 DAYS MICROBIOLOGY   Microbiology Results (last 7 days)       ** No results found for the last 168 hours. **            MOST RECENT IMAGING  Echo    Left Ventricle: Left ventricle was not well visualized due to poor   sonic window. The left ventricle is normal in size. Normal wall thickness.   There is normal systolic function with a visually estimated ejection   fraction of 55 - 60%. There is normal diastolic function. E/A ratio is   1.11.    Right Ventricle: The right ventricle is normal in size.    Left Atrium: Mildly dilated    Aortic Valve: There is moderate aortic valve sclerosis. Mildly   calcified cusps.    Mitral Valve: Not well visualized due to poor acoustic window. There is   mild to moderate regurgitation.    Tricuspid Valve: Not well visualized due to poor acoustic window.  X-Ray Chest AP Portable  Narrative: EXAMINATION:  XR CHEST AP PORTABLE    CLINICAL HISTORY:  chest pain;    COMPARISON:  04/10/2025    FINDINGS:  Cutaneous leads project over the chest.  Cardiac silhouette size is upper limit of normal.  No pulmonary edema or confluent airspace disease.  Mild atelectasis right lung base.  No large pleural effusion or pneumothorax.  No acute osseous abnormality.  Impression: No acute pulmonary process.    Electronically signed by: Chin Kingsley  Date:    04/30/2025  Time:    07:43      ECHOCARDIOGRAM RESULTS (last 5)  Results for orders placed during the hospital encounter of 04/08/23    Echo    Interpretation Summary  · The left ventricle is normal in size with concentric remodeling and normal systolic function.  · The estimated ejection fraction is 60%.  · Normal left ventricular diastolic function.  · Normal right ventricular size with normal right ventricular systolic function.  · Normal central venous pressure (3 mmHg).      Results for orders placed during the hospital encounter of 01/26/21    Echo Color Flow Doppler? " Yes    Interpretation Summary  · The left ventricle is normal in size with concentric remodeling and normal systolic function. The estimated ejection fraction is 70%  · Indeterminate left ventricular diastolic function.  · Normal right ventricular size with normal right ventricular systolic function.  · The aortic root is mildly dilated.  · There is mild pulmonary hypertension.      CURRENT/PREVIOUS VISIT EKG  Results for orders placed or performed during the hospital encounter of 04/30/25   EKG 12-lead    Collection Time: 04/30/25  6:59 AM   Result Value Ref Range    QRS Duration 94 ms    OHS QTC Calculation 465 ms    Narrative    Test Reason : R07.9,    Vent. Rate :  65 BPM     Atrial Rate :  65 BPM     P-R Int : 144 ms          QRS Dur :  94 ms      QT Int : 448 ms       P-R-T Axes :  64  45  57 degrees    QTcB Int : 465 ms    Normal sinus rhythm  Normal ECG  No previous ECGs available  Confirmed by Walter Ahumada ([id number]) on 4/30/2025 9:35:31 PM    Referred By: AAAREFERRAL SELF           Confirmed By: Walter Ahumada           ASSESSMENT/PLAN:     Active Hospital Problems    Diagnosis    *STEMI (ST elevation myocardial infarction)    Hypertension    Severe obesity (BMI 35.0-39.9) with comorbidity    Smoker    Alcohol use    CAD (coronary artery disease)       ASSESSMENT & PLAN:   STEMI  CAD  HTN  Anxiety      Troponin trended downward  No ectopy on telemetry overnight  Hemodynamically stable  BB held 2/2 bradycardia  Continue DAPT: Brilinta and ASA  Continue statin therapy  Continue PPI        Reema Stewart NP  [facility]  Department of Cardiology  Date of Service: 05/01/2025  5:29 AM      I have personally interviewed and examined the patient, I have reviewed the Nurse Practitioner's history and physical, assessment, and plan.  I have also reviewed and antiplatelet all the pertinent lab and imaging data. I have personally evaluated the patient at bedside and agree with the findings and made  appropriate changes as necessary in recommendations.        Dr. Narinder MD  Department of Cardiology  [facility]  Date of Service 5/1/25           [1]   Social History  Tobacco Use    Smoking status: Every Day     Current packs/day: 0.75     Average packs/day: 0.7 packs/day for 39.3 years (29.5 ttl pk-yrs)     Types: Cigarettes     Start date: 1986   Substance Use Topics    Alcohol use: Yes     Comment: 1/4 bottle whiskey every day    Drug use: Yes     Types: Marijuana   [2]   No current facility-administered medications on file prior to encounter.     Current Outpatient Medications on File Prior to Encounter   Medication Sig Dispense Refill    amLODIPine (NORVASC) 5 MG tablet Take 1 tablet (5 mg total) by mouth once daily. 90 tablet 0    lisinopriL 10 MG tablet Take 1 tablet (10 mg total) by mouth once daily. 90 tablet 0    metoprolol tartrate (LOPRESSOR) 25 MG tablet Take 1 tablet (25 mg total) by mouth 2 (two) times daily. 180 tablet 0    nitroGLYCERIN (NITROSTAT) 0.4 MG SL tablet Place 1 tablet (0.4 mg total) under the tongue every 5 (five) minutes as needed for Chest pain. 30 tablet 1    polyethylene glycol (GLYCOLAX) 17 gram PwPk Take 17 g by mouth once daily. (Patient taking differently: Take 17 g by mouth every other day.) 30 packet 0    aspirin 81 MG Chew CHEW AND SWALLOW 1 TABLET(81 MG) BY MOUTH EVERY DAY (Patient taking differently: Take 81 mg by mouth once daily.) 90 tablet 0    HYDROcodone-acetaminophen (NORCO) 5-325 mg per tablet Take 1 tablet by mouth every 6 (six) hours as needed for Pain. (Patient not taking: Reported on 4/30/2025) 12 tablet 0    oxyCODONE-acetaminophen (PERCOCET)  mg per tablet Take 1 tablet by mouth every 6 (six) hours as needed for Pain. (Patient not taking: Reported on 4/30/2025) 20 tablet 0

## 2025-05-01 NOTE — ASSESSMENT & PLAN NOTE
Body mass index is 36.73 kg/m². Morbid obesity complicates all aspects of disease management from diagnostic modalities to treatment. Weight loss encouraged and health benefits explained to patient.

## 2025-05-01 NOTE — PLAN OF CARE
05/01/25 0753   Patient Assessment/Suction   Level of Consciousness (AVPU) alert   Respiratory Effort Unlabored;Normal   Expansion/Accessory Muscles/Retractions no retractions;no use of accessory muscles   PRE-TX-O2   Device (Oxygen Therapy) nasal cannula   $ Is the patient on Low Flow Oxygen? Yes   Flow (L/min) (Oxygen Therapy) 3   SpO2 98 %   Pulse Oximetry Type Continuous   $ Pulse Oximetry - Multiple Charge Pulse Oximetry - Multiple   Pulse 61   Resp 20   Education   $ Education Oxygen;15 min

## 2025-05-01 NOTE — ASSESSMENT & PLAN NOTE
Patient's blood pressure range in the last 24 hours was: BP  Min: 123/60  Max: 155/92.The patient's inpatient anti-hypertensive regimen is listed below:  Current Antihypertensives  lisinopriL tablet 10 mg, Daily, Oral    Plan  - BP is controlled, no changes needed to their regimen

## 2025-05-01 NOTE — PROGRESS NOTES
[facility] Medicine  Progress Note    Patient Name: Stuart Adler  MRN: [id number]  Patient Class: IP- Inpatient   Admission Date: 4/30/2025  Length of Stay: 1 days  Attending Physician: Cassius Duarte MD  Primary Care Provider: No primary care provider on file.        Subjective     Principal Problem:STEMI (ST elevation myocardial infarction)        HPI:  The patient is a 64-year-old male with past medical history of hypertension, hyperlipidemia, CAD status post stent ( PCI to mid circumflex in 2022), tobacco abuse who presented to the ED for the evaluation of chest pain.    The history is difficult to take from the patient as the patient is in significant pain.  In the ED, patient received 2 doses of nitro, 4 mg of IV morphine and then 2 additional mg of IV morphine and despite that patient complained of significant pain.  While in the ED, the patient went into torsades, CPR was started and patient had to be shocked 1 time.  The patient was given IV magnesium.    Overview/Hospital Course:  Patient was admitted to Hospital Medicine and emergently taken to cardiac catheterization lab where patient underwent a successful right distal right coronary artery PCI/drug-eluting stent deployment.  Patient tolerated procedure.  Chest pain resolved.  Cardiology team closely followed patient.    Interval History:  Patient is seen and examined during multidisciplinary rounds.  S/p left heart catheterization for STEMI were 99% distal RCA PCI/drug-eluting stent placement performed.  Patient tolerated procedure well.  Patient denies any chest pain, palpitations or shortness of breath.    Review of Systems   Constitutional:  Negative for chills and fever.   HENT:  Negative for congestion and sore throat.    Eyes:  Negative for visual disturbance.   Respiratory:  Negative for cough and shortness of breath.    Cardiovascular:  Negative for chest pain and palpitations.   Gastrointestinal:  Negative for  abdominal pain, constipation, diarrhea, nausea and vomiting.   Endocrine: Negative for cold intolerance and heat intolerance.   Genitourinary:  Negative for dysuria and hematuria.   Musculoskeletal:  Negative for arthralgias and myalgias.   Skin:  Negative for rash.   Neurological:  Negative for tremors and seizures.   Hematological:  Negative for adenopathy. Does not bruise/bleed easily.     Objective:     Vital Signs (Most Recent):  Temp: 98 °F (36.7 °C) (05/01/25 0430)  Pulse: 61 (05/01/25 0753)  Resp: 20 (05/01/25 0753)  BP: 123/60 (04/30/25 1929)  SpO2: 98 % (05/01/25 0753) Vital Signs (24h Range):  Temp:  [97.3 °F (36.3 °C)-98 °F (36.7 °C)] 98 °F (36.7 °C)  Pulse:  [51-61] 61  Resp:  [13-34] 20  SpO2:  [95 %-99 %] 98 %  BP: (123-155)/(60-94) 123/60  Arterial Line BP: (124-157)/() 124/120     Weight: 133.3 kg (293 lb 14 oz)  Body mass index is 36.73 kg/m².    Intake/Output Summary (Last 24 hours) at 5/1/2025 0840  Last data filed at 5/1/2025 0413  Gross per 24 hour   Intake 803.75 ml   Output 2650 ml   Net -1846.25 ml         Physical Exam  Vitals and nursing note reviewed.   Constitutional:       Appearance: Normal appearance. He is well-developed.   HENT:      Head: Normocephalic and atraumatic.      Nose: Nose normal. No septal deviation.   Eyes:      Conjunctiva/sclera: Conjunctivae normal.      Pupils: Pupils are equal, round, and reactive to light.   Neck:      Thyroid: No thyroid mass.      Vascular: No JVD.      Trachea: No tracheal tenderness or tracheal deviation.   Cardiovascular:      Rate and Rhythm: Normal rate and regular rhythm.      Heart sounds: S1 normal and S2 normal. No murmur heard.     No friction rub. No gallop.   Pulmonary:      Effort: Pulmonary effort is normal.      Breath sounds: Normal breath sounds. No decreased breath sounds, wheezing, rhonchi or rales.   Abdominal:      General: Bowel sounds are normal. There is no distension.      Palpations: Abdomen is soft. There is no  "hepatomegaly, splenomegaly or mass.      Tenderness: There is no abdominal tenderness.   Musculoskeletal:      Comments: Right groin without hematoma.   Skin:     General: Skin is warm.      Findings: No rash.   Neurological:      General: No focal deficit present.      Mental Status: He is alert and oriented to person, place, and time.      Cranial Nerves: No cranial nerve deficit.      Sensory: No sensory deficit.   Psychiatric:         Mood and Affect: Mood normal.         Behavior: Behavior normal.               Significant Labs: All pertinent labs within the past 24 hours have been reviewed.  CBC:   Recent Labs   Lab 04/30/25  0458 05/01/25  0301   WBC 5.98 6.13   HGB 15.1 14.7   HCT 46.2 44.4    129*     CMP:   Recent Labs   Lab 04/30/25  0458 05/01/25  0301    136   K 3.9 4.2    103   CO2 24 29   * 105   BUN 16 11   CREATININE 0.9 0.7   CALCIUM 9.2 8.5*   PROT 6.6 6.2   ALBUMIN 3.8 3.5   BILITOT 0.5 0.6   ALKPHOS 78 72   AST 15 80*   ALT 15 29   ANIONGAP 8 4*     Coagulation:   Recent Labs   Lab 04/30/25  0736   INR 1.1     Lactic Acid: No results for input(s): "LACTATE" in the last 48 hours.  Troponin: No results for input(s): "TROPONINI", "TROPONINIHS" in the last 48 hours.  TSH: No results for input(s): "TSH" in the last 4320 hours.  Urine Studies: No results for input(s): "COLORU", "APPEARANCEUA", "PHUR", "SPECGRAV", "PROTEINUA", "GLUCUA", "KETONESU", "BILIRUBINUA", "OCCULTUA", "NITRITE", "UROBILINOGEN", "LEUKOCYTESUR", "RBCUA", "WBCUA", "BACTERIA", "SQUAMEPITHEL", "HYALINECASTS" in the last 48 hours.    Invalid input(s): "WRIGHTSUR"  Microbiology Results (last 7 days)       ** No results found for the last 168 hours. **          Significant Imaging:   ECHO:     Left Ventricle: Left ventricle was not well visualized due to poor sonic window. The left ventricle is normal in size. Normal wall thickness. There is normal systolic function with a visually estimated ejection fraction " of 55 - 60%. There is normal diastolic function. E/A ratio is 1.11.    Right Ventricle: The right ventricle is normal in size.    Left Atrium: Mildly dilated    Aortic Valve: There is moderate aortic valve sclerosis. Mildly calcified cusps.    Mitral Valve: Not well visualized due to poor acoustic window. There is mild to moderate regurgitation.    Tricuspid Valve: Not well visualized due to poor acoustic window.    LHC: 99% ISR of distal RCA for which IVUS guided PCI of the distal RCA was performed with a 3.5 x 20 BROOKE and post-dil with 3.75 x 12 NC balloon. Of note, the right common femoral sheath was challenging to place due to scar tissue. Unclear if we would be able to safely deploy angioseal given scar tissue; hence, sheath sutured in place. Plan to remove sheath and apply manual pressure once ACT < 180. INR noted to be elevated; however, patient denies taking anticoagulation. Plan to recheck INR to ensure this is not a lab error. Full cath report to follow.     CXR: No acute pulmonary process.       Assessment & Plan  STEMI (ST elevation myocardial infarction)  Continue tele monitoring.  Follow Cardiology recommendations.  Continue aspirin and Brilinta.  Medication compliance discussed with the patient.  Post left heart catheterization precautions.  2D echocardiogram results reviewed with ejection fraction 55-60%.  Trend serial cardiac enzymes.  Continue aspirin, Brilinta, statin therapy and current cardiac medications.    Smoker  Smoking cessation counseling performed. Dangers of cigarette smoking were reviewed with patient in detail and patient was encouraged to quit. Nicotine replacement options were discussed for > 3 minutes.      Alcohol use  Monitor for any signs and symptoms for delirium tremens.  Alcohol abstinence counseling performed.    CAD (coronary artery disease)  Patient has prior history of coronary artery disease and acute myocardial infarction and prior history of coronary artery stent  placement (PCI with mid circumflex, 2022).  See management of ST-elevation MI.  Severe obesity (BMI 35.0-39.9) with comorbidity  Body mass index is 36.73 kg/m². Morbid obesity complicates all aspects of disease management from diagnostic modalities to treatment. Weight loss encouraged and health benefits explained to patient.       Hypertension  Patient's blood pressure range in the last 24 hours was: BP  Min: 123/60  Max: 155/92.The patient's inpatient anti-hypertensive regimen is listed below:  Current Antihypertensives  lisinopriL tablet 10 mg, Daily, Oral    Plan  - BP is controlled, no changes needed to their regimen    Patient encouraged to follow-up with dermatologist regarding basal cell carcinoma of nose surveillance.  Home oxygen evaluation to be performed by respiratory therapist.  Discharge planning as per Cardiology recommendations.    VTE Risk Mitigation (From admission, onward)           Ordered     IP VTE HIGH RISK PATIENT  Once         04/30/25 0619     Place sequential compression device  Until discontinued         04/30/25 0619                    Discharge Planning   CLARKE: 5/2/2025     Code Status: Full Code   Medical Readiness for Discharge Date:   Discharge Plan A: Home with family            Critical care time spent on the evaluation and treatment of severe organ dysfunction, review of pertinent labs and imaging studies, discussions with consulting providers and discussions with patient/family: 35 minutes.            Cassius Duarte MD  Department of Hospital Medicine   [facility]

## 2025-05-01 NOTE — PLAN OF CARE
JACOB sent patient information to [facility] via [employer].       05/01/25 1238   Post-Acute Status   Post-Acute Authorization Home [facility]   Home Health Status Referrals Sent

## 2025-05-01 NOTE — ASSESSMENT & PLAN NOTE
Continue tele monitoring.  Follow Cardiology recommendations.  Continue aspirin and Brilinta.  Medication compliance discussed with the patient.  Post left heart catheterization precautions.  2D echocardiogram results reviewed with ejection fraction 55-60%.  Trend serial cardiac enzymes.  Continue aspirin, Brilinta, statin therapy and current cardiac medications.

## 2025-05-01 NOTE — CARE UPDATE
04/30/25 1929   Patient Assessment/Suction   Level of Consciousness (AVPU) alert   Respiratory Effort Normal;Unlabored   Expansion/Accessory Muscles/Retractions no use of accessory muscles;no retractions   All Lung Fields Breath Sounds diminished   Rhythm/Pattern, Respiratory unlabored;pattern regular   Cough Frequency no cough   Skin Integrity   $ Wound Care Tech Time 15 min   Area Observed Left;Right;Behind ear;Cheek;Nares   Skin Appearance without discoloration   PRE-TX-O2   Device (Oxygen Therapy) nasal cannula   Flow (L/min) (Oxygen Therapy) 3   SpO2 98 %   Pulse Oximetry Type Continuous   Pulse (!) 57   Resp (!) 21   /60

## 2025-05-01 NOTE — PLAN OF CARE
Patient is active with [facility], will resume at discharge.       05/01/25 1131   Post-Acute Status   Post-Acute Authorization Home Health   Home Health Status Referrals Sent   Patient choice form signed by patient/caregiver List with quality metrics by geographic area provided   Discharge Delays None known at this time   Discharge Plan   Discharge Plan A Home Health   Discharge Plan B Home Health

## 2025-05-02 ENCOUNTER — TELEPHONE (OUTPATIENT)
Dept: HOME HEALTH SERVICES | Facility: CLINIC | Age: 65
End: 2025-05-02
Payer: MEDICARE

## 2025-05-02 LAB
ABSOLUTE EOSINOPHIL (SMH): 0.09 K/UL
ABSOLUTE MONOCYTE (SMH): 0.91 K/UL (ref 0.3–1)
ABSOLUTE NEUTROPHIL COUNT (SMH): 3.8 K/UL (ref 1.8–7.7)
ALBUMIN SERPL-MCNC: 3.7 G/DL (ref 3.5–5.2)
ALP SERPL-CCNC: 73 UNIT/L (ref 55–135)
ALT SERPL-CCNC: 24 UNIT/L (ref 10–44)
ANION GAP (SMH): 7 MMOL/L (ref 8–16)
AST SERPL-CCNC: 46 UNIT/L (ref 10–40)
BASOPHILS # BLD AUTO: 0.02 K/UL
BASOPHILS NFR BLD AUTO: 0.3 %
BILIRUB SERPL-MCNC: 0.8 MG/DL (ref 0.1–1)
BUN SERPL-MCNC: 13 MG/DL (ref 8–23)
CALCIUM SERPL-MCNC: 8.8 MG/DL (ref 8.7–10.5)
CHLORIDE SERPL-SCNC: 102 MMOL/L (ref 95–110)
CO2 SERPL-SCNC: 28 MMOL/L (ref 23–29)
CREAT SERPL-MCNC: 0.8 MG/DL (ref 0.5–1.4)
ERYTHROCYTE [DISTWIDTH] IN BLOOD BY AUTOMATED COUNT: 12.2 % (ref 11.5–14.5)
GFR SERPLBLD CREATININE-BSD FMLA CKD-EPI: >60 ML/MIN/1.73/M2
GLUCOSE SERPL-MCNC: 103 MG/DL (ref 70–110)
HCT VFR BLD AUTO: 47 % (ref 40–54)
HGB BLD-MCNC: 15.7 GM/DL (ref 14–18)
IMM GRANULOCYTES # BLD AUTO: 0.01 K/UL (ref 0–0.04)
IMM GRANULOCYTES NFR BLD AUTO: 0.2 % (ref 0–0.5)
LYMPHOCYTES # BLD AUTO: 0.89 K/UL (ref 1–4.8)
MAGNESIUM SERPL-MCNC: 2 MG/DL (ref 1.6–2.6)
MCH RBC QN AUTO: 32.8 PG (ref 27–31)
MCHC RBC AUTO-ENTMCNC: 33.4 G/DL (ref 32–36)
MCV RBC AUTO: 98 FL (ref 82–98)
NUCLEATED RBC (/100WBC) (SMH): 0 /100 WBC
PLATELET # BLD AUTO: 123 K/UL (ref 150–450)
PMV BLD AUTO: 11 FL (ref 9.2–12.9)
POTASSIUM SERPL-SCNC: 4 MMOL/L (ref 3.5–5.1)
PROT SERPL-MCNC: 6.6 GM/DL (ref 6–8.4)
RBC # BLD AUTO: 4.79 M/UL (ref 4.6–6.2)
RELATIVE EOSINOPHIL (SMH): 1.6 % (ref 0–8)
RELATIVE LYMPHOCYTE (SMH): 15.5 % (ref 18–48)
RELATIVE MONOCYTE (SMH): 15.9 % (ref 4–15)
RELATIVE NEUTROPHIL (SMH): 66.5 % (ref 38–73)
SODIUM SERPL-SCNC: 137 MMOL/L (ref 136–145)
WBC # BLD AUTO: 5.73 K/UL (ref 3.9–12.7)

## 2025-05-02 PROCEDURE — 20000000 HC ICU ROOM

## 2025-05-02 PROCEDURE — 25000003 PHARM REV CODE 250: Performed by: INTERNAL MEDICINE

## 2025-05-02 PROCEDURE — 99900031 HC PATIENT EDUCATION (STAT)

## 2025-05-02 PROCEDURE — 99900035 HC TECH TIME PER 15 MIN (STAT)

## 2025-05-02 PROCEDURE — 25000242 PHARM REV CODE 250 ALT 637 W/ HCPCS: Performed by: INTERNAL MEDICINE

## 2025-05-02 PROCEDURE — 36415 COLL VENOUS BLD VENIPUNCTURE: CPT | Performed by: STUDENT IN AN ORGANIZED HEALTH CARE EDUCATION/TRAINING PROGRAM

## 2025-05-02 PROCEDURE — 99233 SBSQ HOSP IP/OBS HIGH 50: CPT | Mod: ,,, | Performed by: INTERNAL MEDICINE

## 2025-05-02 PROCEDURE — 80053 COMPREHEN METABOLIC PANEL: CPT | Performed by: STUDENT IN AN ORGANIZED HEALTH CARE EDUCATION/TRAINING PROGRAM

## 2025-05-02 PROCEDURE — 25000003 PHARM REV CODE 250

## 2025-05-02 PROCEDURE — 25000003 PHARM REV CODE 250: Performed by: STUDENT IN AN ORGANIZED HEALTH CARE EDUCATION/TRAINING PROGRAM

## 2025-05-02 PROCEDURE — 94761 N-INVAS EAR/PLS OXIMETRY MLT: CPT

## 2025-05-02 PROCEDURE — 63600175 PHARM REV CODE 636 W HCPCS: Performed by: STUDENT IN AN ORGANIZED HEALTH CARE EDUCATION/TRAINING PROGRAM

## 2025-05-02 PROCEDURE — 85025 COMPLETE CBC W/AUTO DIFF WBC: CPT | Performed by: STUDENT IN AN ORGANIZED HEALTH CARE EDUCATION/TRAINING PROGRAM

## 2025-05-02 PROCEDURE — 83735 ASSAY OF MAGNESIUM: CPT | Performed by: STUDENT IN AN ORGANIZED HEALTH CARE EDUCATION/TRAINING PROGRAM

## 2025-05-02 RX ORDER — METOPROLOL TARTRATE 25 MG/1
12.5 TABLET ORAL ONCE
Status: COMPLETED | OUTPATIENT
Start: 2025-05-02 | End: 2025-05-02

## 2025-05-02 RX ORDER — METOPROLOL TARTRATE 25 MG/1
25 TABLET, FILM COATED ORAL 2 TIMES DAILY
Status: DISCONTINUED | OUTPATIENT
Start: 2025-05-02 | End: 2025-05-03 | Stop reason: HOSPADM

## 2025-05-02 RX ORDER — PANTOPRAZOLE SODIUM 40 MG/1
40 TABLET, DELAYED RELEASE ORAL DAILY
Qty: 30 TABLET | Refills: 0 | Status: SHIPPED | OUTPATIENT
Start: 2025-05-03 | End: 2025-05-03

## 2025-05-02 RX ORDER — TICAGRELOR 90 MG/1
90 TABLET, FILM COATED ORAL 2 TIMES DAILY
Qty: 60 TABLET | Refills: 1 | Status: SHIPPED | OUTPATIENT
Start: 2025-05-02 | End: 2025-05-03

## 2025-05-02 RX ORDER — METOPROLOL TARTRATE 25 MG/1
12.5 TABLET ORAL 2 TIMES DAILY
Status: DISCONTINUED | OUTPATIENT
Start: 2025-05-02 | End: 2025-05-02

## 2025-05-02 RX ORDER — ATORVASTATIN CALCIUM 80 MG/1
80 TABLET, FILM COATED ORAL DAILY
Qty: 90 TABLET | Refills: 0 | Status: SHIPPED | OUTPATIENT
Start: 2025-05-02 | End: 2025-05-03

## 2025-05-02 RX ADMIN — MUPIROCIN 1 G: 20 OINTMENT TOPICAL at 08:05

## 2025-05-02 RX ADMIN — MORPHINE SULFATE 2 MG: 2 INJECTION, SOLUTION INTRAMUSCULAR; INTRAVENOUS at 02:05

## 2025-05-02 RX ADMIN — METOPROLOL TARTRATE 12.5 MG: 25 TABLET, FILM COATED ORAL at 01:05

## 2025-05-02 RX ADMIN — MUPIROCIN 1 G: 20 OINTMENT TOPICAL at 09:05

## 2025-05-02 RX ADMIN — LISINOPRIL 10 MG: 10 TABLET ORAL at 08:05

## 2025-05-02 RX ADMIN — TICAGRELOR 90 MG: 90 TABLET ORAL at 09:05

## 2025-05-02 RX ADMIN — Medication 3 MG: at 09:05

## 2025-05-02 RX ADMIN — ATORVASTATIN CALCIUM 80 MG: 40 TABLET, FILM COATED ORAL at 08:05

## 2025-05-02 RX ADMIN — METOPROLOL TARTRATE 12.5 MG: 25 TABLET, FILM COATED ORAL at 03:05

## 2025-05-02 RX ADMIN — ASPIRIN 81 MG: 81 TABLET, COATED ORAL at 08:05

## 2025-05-02 RX ADMIN — SODIUM CHLORIDE 250 ML: 9 INJECTION, SOLUTION INTRAVENOUS at 02:05

## 2025-05-02 RX ADMIN — FLUTICASONE PROPIONATE 100 MCG: 50 SPRAY, METERED NASAL at 08:05

## 2025-05-02 RX ADMIN — PANTOPRAZOLE SODIUM 40 MG: 40 TABLET, DELAYED RELEASE ORAL at 06:05

## 2025-05-02 RX ADMIN — TICAGRELOR 90 MG: 90 TABLET ORAL at 08:05

## 2025-05-02 NOTE — CARE UPDATE
05/01/25 2000   Patient Assessment/Suction   Level of Consciousness (AVPU) alert   Respiratory Effort Normal;Unlabored   Expansion/Accessory Muscles/Retractions no use of accessory muscles;no retractions;expansion symmetric   All Lung Fields Breath Sounds diminished;equal bilaterally   Rhythm/Pattern, Respiratory no shortness of breath reported;pattern regular;unlabored   Cough Frequency no cough   PRE-TX-O2   Device (Oxygen Therapy) room air   Pulse Oximetry Type Continuous   $ Pulse Oximetry - Multiple Charge Pulse Oximetry - Multiple

## 2025-05-02 NOTE — SUBJECTIVE & OBJECTIVE
Interval History:  Patient is seen and examined during multidisciplinary rounds.  S/p left heart catheterization for STEMI were 99% distal RCA PCI/drug-eluting stent placement performed.  Patient tolerated procedure well.  Patient denies any chest pain, palpitations or shortness of breath.    Review of Systems   Constitutional:  Negative for chills and fever.   HENT:  Negative for congestion and sore throat.    Eyes:  Negative for visual disturbance.   Respiratory:  Negative for cough and shortness of breath.    Cardiovascular:  Negative for chest pain and palpitations.   Gastrointestinal:  Negative for abdominal pain, constipation, diarrhea, nausea and vomiting.   Endocrine: Negative for cold intolerance and heat intolerance.   Genitourinary:  Negative for dysuria and hematuria.   Musculoskeletal:  Negative for arthralgias and myalgias.   Skin:  Negative for rash.   Neurological:  Negative for tremors and seizures.   Hematological:  Negative for adenopathy. Does not bruise/bleed easily.     Objective:     Vital Signs (Most Recent):  Temp: 97.9 °F (36.6 °C) (05/03/25 0300)  Pulse: 62 (05/03/25 0706)  Resp: 18 (05/03/25 0706)  BP: 101/71 (05/03/25 0701)  SpO2: (!) 94 % (05/03/25 0706) Vital Signs (24h Range):  Temp:  [97.9 °F (36.6 °C)-99.2 °F (37.3 °C)] 97.9 °F (36.6 °C)  Pulse:  [] 62  Resp:  [12-45] 18  SpO2:  [83 %-98 %] 94 %  BP: ()/(53-92) 101/71     Weight: 123.7 kg (272 lb 11.3 oz)  Body mass index is 34.09 kg/m².    Intake/Output Summary (Last 24 hours) at 5/3/2025 0823  Last data filed at 5/3/2025 0600  Gross per 24 hour   Intake 420 ml   Output 1005 ml   Net -585 ml         Physical Exam  Vitals and nursing note reviewed.   Constitutional:       Appearance: Normal appearance. He is well-developed.   HENT:      Head: Normocephalic and atraumatic.      Nose: Nose normal. No septal deviation.   Eyes:      Conjunctiva/sclera: Conjunctivae normal.      Pupils: Pupils are equal, round, and reactive  "to light.   Neck:      Thyroid: No thyroid mass.      Vascular: No JVD.      Trachea: No tracheal tenderness or tracheal deviation.   Cardiovascular:      Rate and Rhythm: Normal rate and regular rhythm.      Heart sounds: S1 normal and S2 normal. No murmur heard.     No friction rub. No gallop.   Pulmonary:      Effort: Pulmonary effort is normal.      Breath sounds: Normal breath sounds. No decreased breath sounds, wheezing, rhonchi or rales.   Abdominal:      General: Bowel sounds are normal. There is no distension.      Palpations: Abdomen is soft. There is no hepatomegaly, splenomegaly or mass.      Tenderness: There is no abdominal tenderness.   Musculoskeletal:      Comments: Right groin without hematoma.   Skin:     General: Skin is warm.      Findings: No rash.   Neurological:      General: No focal deficit present.      Mental Status: He is alert and oriented to person, place, and time.      Cranial Nerves: No cranial nerve deficit.      Sensory: No sensory deficit.   Psychiatric:         Mood and Affect: Mood normal.         Behavior: Behavior normal.               Significant Labs: All pertinent labs within the past 24 hours have been reviewed.  CBC:   Recent Labs   Lab 05/02/25  0356 05/03/25  0315   WBC 5.73 5.87   HGB 15.7 15.4   HCT 47.0 46.9   * 140*     CMP:   Recent Labs   Lab 05/02/25  0356 05/03/25  0315    137   K 4.0 4.1    105   CO2 28 25    100   BUN 13 18   CREATININE 0.8 0.8   CALCIUM 8.8 8.7   PROT 6.6 6.5   ALBUMIN 3.7 3.6   BILITOT 0.8 0.8   ALKPHOS 73 68   AST 46* 33   ALT 24 26   ANIONGAP 7* 7*     Coagulation:   No results for input(s): "PT", "INR", "APTT" in the last 48 hours.    Lactic Acid: No results for input(s): "LACTATE" in the last 48 hours.  Troponin: No results for input(s): "TROPONINI", "TROPONINIHS" in the last 48 hours.  TSH: No results for input(s): "TSH" in the last 4320 hours.  Urine Studies: No results for input(s): "COLORU", "APPEARANCEUA", " ""PHUR", "SPECGRAV", "PROTEINUA", "GLUCUA", "KETONESU", "BILIRUBINUA", "OCCULTUA", "NITRITE", "UROBILINOGEN", "LEUKOCYTESUR", "RBCUA", "WBCUA", "BACTERIA", "SQUAMEPITHEL", "HYALINECASTS" in the last 48 hours.    Invalid input(s): "WRIGHTSUR"  Microbiology Results (last 7 days)       ** No results found for the last 168 hours. **          Significant Imaging:   ECHO:     Left Ventricle: Left ventricle was not well visualized due to poor sonic window. The left ventricle is normal in size. Normal wall thickness. There is normal systolic function with a visually estimated ejection fraction of 55 - 60%. There is normal diastolic function. E/A ratio is 1.11.    Right Ventricle: The right ventricle is normal in size.    Left Atrium: Mildly dilated    Aortic Valve: There is moderate aortic valve sclerosis. Mildly calcified cusps.    Mitral Valve: Not well visualized due to poor acoustic window. There is mild to moderate regurgitation.    Tricuspid Valve: Not well visualized due to poor acoustic window.    LHC: 99% ISR of distal RCA for which IVUS guided PCI of the distal RCA was performed with a 3.5 x 20 BROOKE and post-dil with 3.75 x 12 NC balloon. Of note, the right common femoral sheath was challenging to place due to scar tissue. Unclear if we would be able to safely deploy angioseal given scar tissue; hence, sheath sutured in place. Plan to remove sheath and apply manual pressure once ACT < 180. INR noted to be elevated; however, patient denies taking anticoagulation. Plan to recheck INR to ensure this is not a lab error. Full cath report to follow.     CXR: No acute pulmonary process.   "

## 2025-05-02 NOTE — NURSING
ambulated pt in hallway. after 1 lap, HR went to 130 bpm for several minutes. Dr Barcenas came by as HR was still in 120's. MD increased beta blocker to 25 mg from 12.5 and added a 250 ml bolus of NS due to pt's bp was 99/65. After bolus and SBP is >110, then, pt can be given another dose of 12.5 mg lopressor for a total of 25mg as per pt's home dose. Will ambulate pt again. If VS are with in normal limits then he can go home later this afternoon. Primary MD and charge RN made aware.

## 2025-05-02 NOTE — DISCHARGE INSTRUCTIONS
Please abstain from drinking alcohol and do not smoke tobacco cigarettes.    Take medications as directed on regular basis.    Follow-up with your dermatologist regarding basal cell carcinoma management at earliest.    No tub bath, swimming, or water submersion for 4 days.    No lifting over 10 pounds for one week.     Sedentary activity for one week.     May take groin dressing off by bedtime on Friday. Inspect site with small mirror for redness or infection. May leave open to air.

## 2025-05-02 NOTE — ASSESSMENT & PLAN NOTE
Patient's blood pressure range in the last 24 hours was: BP  Min: 96/57  Max: 158/86.The patient's inpatient anti-hypertensive regimen is listed below:  Current Antihypertensives  lisinopriL tablet 10 mg, Daily, Oral    Plan  - BP is controlled, no changes needed to their regimen

## 2025-05-02 NOTE — PROGRESS NOTES
[facility] Medicine  Progress Note    Patient Name: Stuart Adler  MRN: [id number]  Patient Class: IP- Inpatient   Admission Date: 4/30/2025  Length of Stay: 2 days  Attending Physician: Cassius Duarte MD  Primary Care Provider: No primary care provider on file.        Subjective     Principal Problem:STEMI (ST elevation myocardial infarction)        HPI:  The patient is a 64-year-old male with past medical history of hypertension, hyperlipidemia, CAD status post stent ( PCI to mid circumflex in 2022), tobacco abuse who presented to the ED for the evaluation of chest pain.    The history is difficult to take from the patient as the patient is in significant pain.  In the ED, patient received 2 doses of nitro, 4 mg of IV morphine and then 2 additional mg of IV morphine and despite that patient complained of significant pain.  While in the ED, the patient went into torsades, CPR was started and patient had to be shocked 1 time.  The patient was given IV magnesium.    Overview/Hospital Course:  Patient was admitted to Hospital Medicine and emergently taken to cardiac catheterization lab where patient underwent a successful right distal right coronary artery PCI/drug-eluting stent deployment.  Patient tolerated procedure.  Chest pain resolved.  Cardiology team closely followed patient.  Medication compliance especially with Brilinta discussed with patient.  Patient encouraged to follow-up with dermatologist regarding surveillance of basal cell carcinoma of nose.    Interval History:  Patient is seen and examined during multidisciplinary rounds.  S/p left heart catheterization for STEMI were 99% distal RCA PCI/drug-eluting stent placement performed.  Patient tolerated procedure well.  Patient denies any chest pain, palpitations or shortness of breath.    Review of Systems   Constitutional:  Negative for chills and fever.   HENT:  Negative for congestion and sore throat.    Eyes:  Negative for  visual disturbance.   Respiratory:  Negative for cough and shortness of breath.    Cardiovascular:  Negative for chest pain and palpitations.   Gastrointestinal:  Negative for abdominal pain, constipation, diarrhea, nausea and vomiting.   Endocrine: Negative for cold intolerance and heat intolerance.   Genitourinary:  Negative for dysuria and hematuria.   Musculoskeletal:  Negative for arthralgias and myalgias.   Skin:  Negative for rash.   Neurological:  Negative for tremors and seizures.   Hematological:  Negative for adenopathy. Does not bruise/bleed easily.     Objective:     Vital Signs (Most Recent):  Temp: 98.2 °F (36.8 °C) (05/02/25 0300)  Pulse: 83 (05/02/25 0705)  Resp: 20 (05/02/25 0705)  BP: (!) 116/56 (05/02/25 0600)  SpO2: 96 % (05/02/25 0705) Vital Signs (24h Range):  Temp:  [98.2 °F (36.8 °C)-98.7 °F (37.1 °C)] 98.2 °F (36.8 °C)  Pulse:  [] 83  Resp:  [7-51] 20  SpO2:  [90 %-100 %] 96 %  BP: ()/(56-86) 116/56     Weight: 125.1 kg (275 lb 12.7 oz)  Body mass index is 34.47 kg/m².    Intake/Output Summary (Last 24 hours) at 5/2/2025 0737  Last data filed at 5/2/2025 0501  Gross per 24 hour   Intake 240 ml   Output 1775 ml   Net -1535 ml         Physical Exam  Vitals and nursing note reviewed.   Constitutional:       Appearance: Normal appearance. He is well-developed.   HENT:      Head: Normocephalic and atraumatic.      Nose: Nose normal. No septal deviation.   Eyes:      Conjunctiva/sclera: Conjunctivae normal.      Pupils: Pupils are equal, round, and reactive to light.   Neck:      Thyroid: No thyroid mass.      Vascular: No JVD.      Trachea: No tracheal tenderness or tracheal deviation.   Cardiovascular:      Rate and Rhythm: Normal rate and regular rhythm.      Heart sounds: S1 normal and S2 normal. No murmur heard.     No friction rub. No gallop.   Pulmonary:      Effort: Pulmonary effort is normal.      Breath sounds: Normal breath sounds. No decreased breath sounds, wheezing,  "rhonchi or rales.   Abdominal:      General: Bowel sounds are normal. There is no distension.      Palpations: Abdomen is soft. There is no hepatomegaly, splenomegaly or mass.      Tenderness: There is no abdominal tenderness.   Musculoskeletal:      Comments: Right groin without hematoma.   Skin:     General: Skin is warm.      Findings: No rash.   Neurological:      General: No focal deficit present.      Mental Status: He is alert and oriented to person, place, and time.      Cranial Nerves: No cranial nerve deficit.      Sensory: No sensory deficit.   Psychiatric:         Mood and Affect: Mood normal.         Behavior: Behavior normal.               Significant Labs: All pertinent labs within the past 24 hours have been reviewed.  CBC:   Recent Labs   Lab 05/01/25  0301 05/02/25  0356   WBC 6.13 5.73   HGB 14.7 15.7   HCT 44.4 47.0   * 123*     CMP:   Recent Labs   Lab 05/01/25  0301 05/02/25  0356    137   K 4.2 4.0    102   CO2 29 28    103   BUN 11 13   CREATININE 0.7 0.8   CALCIUM 8.5* 8.8   PROT 6.2 6.6   ALBUMIN 3.5 3.7   BILITOT 0.6 0.8   ALKPHOS 72 73   AST 80* 46*   ALT 29 24   ANIONGAP 4* 7*     Coagulation:   No results for input(s): "PT", "INR", "APTT" in the last 48 hours.    Lactic Acid: No results for input(s): "LACTATE" in the last 48 hours.  Troponin: No results for input(s): "TROPONINI", "TROPONINIHS" in the last 48 hours.  TSH: No results for input(s): "TSH" in the last 4320 hours.  Urine Studies: No results for input(s): "COLORU", "APPEARANCEUA", "PHUR", "SPECGRAV", "PROTEINUA", "GLUCUA", "KETONESU", "BILIRUBINUA", "OCCULTUA", "NITRITE", "UROBILINOGEN", "LEUKOCYTESUR", "RBCUA", "WBCUA", "BACTERIA", "SQUAMEPITHEL", "HYALINECASTS" in the last 48 hours.    Invalid input(s): "WRIGHTSUR"  Microbiology Results (last 7 days)       ** No results found for the last 168 hours. **          Significant Imaging:   ECHO:     Left Ventricle: Left ventricle was not well visualized " due to poor sonic window. The left ventricle is normal in size. Normal wall thickness. There is normal systolic function with a visually estimated ejection fraction of 55 - 60%. There is normal diastolic function. E/A ratio is 1.11.    Right Ventricle: The right ventricle is normal in size.    Left Atrium: Mildly dilated    Aortic Valve: There is moderate aortic valve sclerosis. Mildly calcified cusps.    Mitral Valve: Not well visualized due to poor acoustic window. There is mild to moderate regurgitation.    Tricuspid Valve: Not well visualized due to poor acoustic window.    LHC: 99% ISR of distal RCA for which IVUS guided PCI of the distal RCA was performed with a 3.5 x 20 BROOKE and post-dil with 3.75 x 12 NC balloon. Of note, the right common femoral sheath was challenging to place due to scar tissue. Unclear if we would be able to safely deploy angioseal given scar tissue; hence, sheath sutured in place. Plan to remove sheath and apply manual pressure once ACT < 180. INR noted to be elevated; however, patient denies taking anticoagulation. Plan to recheck INR to ensure this is not a lab error. Full cath report to follow.     CXR: No acute pulmonary process.       Assessment & Plan  STEMI (ST elevation myocardial infarction)  Continue tele monitoring.  Follow Cardiology recommendations.  Continue aspirin and Brilinta.  Medication compliance discussed with the patient.  Post left heart catheterization precautions.  2D echocardiogram results reviewed with ejection fraction 55-60%.  Trend serial cardiac enzymes.  Continue aspirin, Brilinta, statin therapy and current cardiac medications.    Smoker  Smoking cessation counseling performed. Dangers of cigarette smoking were reviewed with patient in detail and patient was encouraged to quit. Nicotine replacement options were discussed for > 3 minutes.      Alcohol use  Monitor for any signs and symptoms for delirium tremens.  Alcohol abstinence counseling  performed.    CAD (coronary artery disease)  Patient has prior history of coronary artery disease and acute myocardial infarction and prior history of coronary artery stent placement (PCI with mid circumflex, 2022).  See management of ST-elevation MI.  Severe obesity (BMI 35.0-39.9) with comorbidity  Body mass index is 34.47 kg/m². Morbid obesity complicates all aspects of disease management from diagnostic modalities to treatment. Weight loss encouraged and health benefits explained to patient.       Hypertension  Patient's blood pressure range in the last 24 hours was: BP  Min: 96/57  Max: 158/86.The patient's inpatient anti-hypertensive regimen is listed below:  Current Antihypertensives  lisinopriL tablet 10 mg, Daily, Oral    Plan  - BP is controlled, no changes needed to their regimen    DC home once cleared by cardiology, discussed with CM and bedside nurse.  VTE Risk Mitigation (From admission, onward)           Ordered     IP VTE HIGH RISK PATIENT  Once         04/30/25 0619     Place sequential compression device  Until discontinued         04/30/25 0619                    Discharge Planning   CLARKE: 5/2/2025     Code Status: Full Code   Medical Readiness for Discharge Date:   Discharge Plan A: Home Health   Discharge Delays: None known at this time        Critical care time spent on the evaluation and treatment of severe organ dysfunction, review of pertinent labs and imaging studies, discussions with consulting providers and discussions with patient/family: 35 minutes.            Cassius Duarte MD  Department of Hospital Medicine   [facility]

## 2025-05-02 NOTE — NURSING
After giving cardiologist prior VS and post ambulation VS of 126/74 w/HR 84, MD wants to monitor BP over night. Primary MD and charge rnade aware.

## 2025-05-02 NOTE — PLAN OF CARE
Problem: Wound  Goal: Optimal Coping  Outcome: Progressing  Goal: Optimal Functional Ability  Outcome: Progressing  Goal: Improved Oral Intake  Outcome: Progressing  Goal: Optimal Pain Control and Function  Outcome: Progressing     Problem: Adult Inpatient Plan of Care  Goal: Plan of Care Review  Outcome: Progressing  Goal: Patient-Specific Goal (Individualized)  Outcome: Progressing     Problem: Cardiac Catheterization (Diagnostic/Interventional)  Goal: Stable Heart Rate and Rhythm  Outcome: Progressing  Goal: Optimal Pain Control and Function  Outcome: Progressing   Denies chest pain. Ambulates to restroom. Groin site with dressing dry and intact.

## 2025-05-02 NOTE — NURSING
After second 12.5 mg lopressor, BP ranges from 89/55 - 109/55. HR ranges 59-68. Will continue to monitor and ambulate pt again to see if  VS are stable for DC.

## 2025-05-02 NOTE — ASSESSMENT & PLAN NOTE
Patient's blood pressure range in the last 24 hours was: BP  Min: 96/57  Max: 158/86.The patient's inpatient anti-hypertensive regimen is listed below:  Current Antihypertensives  lisinopriL tablet 10 mg, Daily, Oral    Plan  - BP is controlled, no changes needed to their regimen    DC home once cleared by cardiology, discussed with CM and bedside nurse.

## 2025-05-02 NOTE — PLAN OF CARE
CM gave information on [employer] and discussed follow up. He has order for NP at home placed as well as for HH. States his daughter is coming to see him and can transport him home if cardiology clears him by then. Discussed with nursing who will provide with Tax[employer] if pt does not have family present at time of discharge.  Spoke with pharmacy and Brilinta is covered.

## 2025-05-02 NOTE — PROGRESS NOTES
[facility]  Department of Cardiology  Progress Note      PATIENT NAME: Stuart Adler    MRN: [id number]  TODAY'S DATE: 05/02/2025  ADMIT DATE: 4/30/2025                          CONSULT REQUESTED BY: Cassius Duarte MD    SUBJECTIVE     PRINCIPAL PROBLEM: STEMI    HPI: 64 year old male with a medical history significant for CAD who presented to the ED with chest pain. Patient found to have ST elevations.  Patient given heparin 5000 units bolus and ASA 325mg po x 1.  Cath lab activated by ED team.         INTERVAL HISTORY:    5/2/25    Resting in bed. NAD. No acute events on tele.  SR at rest, ST with ambulation; low BP 90/60.    250 cc bolus ordered    05/01/2025  Patient has tenderness to left lateral ribs from compressions.  Creatinine 0.7.  Vital signs are stable.  No ectopy on telemetry.  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    Kidney stones 04/2025    Right eye injury     removed due to skin CA    Skin cancer     Smoker      Past Surgical History:   Procedure Laterality Date    ANGIOGRAM, CORONARY, WITH LEFT HEART CATHETERIZATION N/A 1/28/2022    Procedure: Angiogram, Coronary, with Left Heart Cath;  Surgeon: Nacho Orellana MD;  Location: [facility] CATH/EP LAB;  Service: Cardiology;  Laterality: N/A;    CYSTOSCOPY N/A 4/10/2025    Procedure: CYSTOSCOPY;  Surgeon: Kaleb Gaffney MD;  Location: [facility] OR;  Service: Urology;  Laterality: N/A;    CYSTOSCOPY W/ URETERAL STENT PLACEMENT Right 3/27/2025    Procedure: CYSTOSCOPY, WITH URETERAL STENT INSERTION;  Surgeon: Kaleb Gaffney MD;  Location: [facility] OR;  Service: Urology;  Laterality: Right;    CYSTOSCOPY W/ URETERAL STENT REMOVAL Bilateral 4/10/2025     Procedure: CYSTOSCOPY, WITH URETERAL STENT REMOVAL;  Surgeon: Kaleb Gaffney MD;  Location: [facility] OR;  Service: Urology;  Laterality: Bilateral;    FACIAL COSMETIC SURGERY      PERCUTANEOUS CORONARY INTERVENTION (PCI) FOR CHRONIC TOTAL OCCLUSION OF CORONARY ARTERY      URETEROSCOPIC REMOVAL OF URETERIC CALCULUS Left 4/10/2025    Procedure: REMOVAL, CALCULUS, URETER, URETEROSCOPIC;  Surgeon: Kaleb Gaffney MD;  Location: [facility] OR;  Service: Urology;  Laterality: Left;    URETEROSCOPY, WITH LASER LITHOTRIPSY Left 4/10/2025    Procedure: URETEROSCOPY, WITH LASER LITHOTRIPSY;  Surgeon: Kaleb Gaffney MD;  Location: [facility] OR;  Service: Urology;  Laterality: Left;     Social History[1]     REVIEW OF SYSTEMS  Per HPI    OBJECTIVE     VITAL SIGNS (Most Recent)  Temp: 98.3 °F (36.8 °C) (05/02/25 1115)  Pulse: 84 (05/02/25 1554)  Resp: 17 (05/02/25 1500)  BP: 118/69 (05/02/25 1554)  SpO2: 96 % (05/02/25 1130)    VENTILATION STATUS  Resp: 17 (05/02/25 1500)  SpO2: 96 % (05/02/25 1130)           I & O (Last 24H):  Intake/Output Summary (Last 24 hours) at 5/2/2025 1608  Last data filed at 5/2/2025 1558  Gross per 24 hour   Intake 360 ml   Output 1655 ml   Net -1295 ml       WEIGHTS  Wt Readings from Last 1 Encounters:   05/02/25 0501 125.1 kg (275 lb 12.7 oz)   04/30/25 1107 133.3 kg (293 lb 14 oz)   04/30/25 0700 127.6 kg (281 lb 4.9 oz)   04/30/25 0501 127 kg (280 lb)       PHYSICAL EXAM  CONSTITUTIONAL:  Resting comfortably in bed  HEENT:  Left lateral nose lesion;    Postsurgical scars on the right side of the face with right-sided enucleation           NECK:  No JVD no carotid bruit  CVS: S1S2+, RRR  LUNGS: Clear  ABDOMEN: Soft, NT, BS+  EXTREMITIES:  Right groin site is soft, no hematoma or bleeding present  : No calero catheter  NEURO: AAO X 3      HOME MEDICATIONS:Medications Ordered Prior to Encounter[2]    SCHEDULED MEDS:   aspirin  81 mg Oral Daily    atorvastatin  80 mg Oral Daily    fluticasone  propionate  2 spray Each Nostril BID    LIDOcaine (cardiac)  1 mg/kg Intravenous Once    metoprolol tartrate  25 mg Oral BID    mupirocin   Nasal BID    pantoprazole  40 mg Oral Daily    ticagrelor  90 mg Oral BID       CONTINUOUS INFUSIONS:    PRN MEDS:  Current Facility-Administered Medications:     acetaminophen, 650 mg, Oral, Q4H PRN    aluminum-magnesium hydroxide-simethicone, 30 mL, Oral, QID PRN    dextrose 50%, 12.5 g, Intravenous, PRN    dextrose 50%, 25 g, Intravenous, PRN    glucagon (human recombinant), 1 mg, Intramuscular, PRN    glucose, 16 g, Oral, PRN    glucose, 24 g, Oral, PRN    magnesium oxide, 800 mg, Oral, PRN    magnesium oxide, 800 mg, Oral, PRN    melatonin, 3 mg, Oral, Nightly PRN    morphine, 2 mg, Intravenous, Q4H PRN    naloxone, 0.02 mg, Intravenous, PRN    ondansetron, 4 mg, Intravenous, Q6H PRN    polyethylene glycol, 17 g, Oral, Daily PRN    potassium bicarbonate, 35 mEq, Oral, PRN    potassium bicarbonate, 50 mEq, Oral, PRN    potassium bicarbonate, 60 mEq, Oral, PRN    potassium, sodium phosphates, 2 packet, Oral, PRN    potassium, sodium phosphates, 2 packet, Oral, PRN    potassium, sodium phosphates, 2 packet, Oral, PRN    sodium chloride 0.9%, 10 mL, Intravenous, PRN    LABS AND DIAGNOSTICS     CBC LAST 3 DAYS  Recent Labs   Lab 04/30/25 0458 05/01/25  0301 05/02/25  0356   WBC 5.98 6.13 5.73   RBC 4.67 4.51* 4.79   HGB 15.1 14.7 15.7   HCT 46.2 44.4 47.0   MCV 99* 98 98   MCH 32.3* 32.6* 32.8*   MCHC 32.7 33.1 33.4   RDW 12.3 12.1 12.2    129* 123*   MPV 10.8 11.1 11.0   NRBC 0 0 0       COAGULATION LAST 3 DAYS  Recent Labs   Lab 04/30/25 0458 04/30/25  0736   INR 3.2* 1.1       CHEMISTRY LAST 3 DAYS  Recent Labs   Lab 04/30/25 0458 05/01/25  0301 05/02/25  0356    136 137   K 3.9 4.2 4.0    103 102   CO2 24 29 28   ANIONGAP 8 4* 7*   BUN 16 11 13   CREATININE 0.9 0.7 0.8   * 105 103   CALCIUM 9.2 8.5* 8.8   MG 1.9 2.0 2.0   ALBUMIN 3.8 3.5 3.7  "  PROT 6.6 6.2 6.6   ALKPHOS 78 72 73   ALT 15 29 24   AST 15 80* 46*   BILITOT 0.5 0.6 0.8       CARDIAC PROFILE LAST 3 DAYS  Recent Labs   Lab 04/30/25  0458   BNP 49       ENDOCRINE LAST 3 DAYS  No results for input(s): "TSH", "PROCAL" in the last 168 hours.    LAST ARTERIAL BLOOD GAS  ABG  No results for input(s): "PH", "PO2", "PCO2", "HCO3", "BE" in the last 168 hours.    LAST 7 DAYS MICROBIOLOGY   Microbiology Results (last 7 days)       ** No results found for the last 168 hours. **            MOST RECENT IMAGING  Echo    Left Ventricle: Left ventricle was not well visualized due to poor   sonic window. The left ventricle is normal in size. Normal wall thickness.   There is normal systolic function with a visually estimated ejection   fraction of 55 - 60%. There is normal diastolic function. E/A ratio is   1.11.    Right Ventricle: The right ventricle is normal in size.    Left Atrium: Mildly dilated    Aortic Valve: There is moderate aortic valve sclerosis. Mildly   calcified cusps.    Mitral Valve: Not well visualized due to poor acoustic window. There is   mild to moderate regurgitation.    Tricuspid Valve: Not well visualized due to poor acoustic window.  X-Ray Chest AP Portable  Narrative: EXAMINATION:  XR CHEST AP PORTABLE    CLINICAL HISTORY:  chest pain;    COMPARISON:  04/10/2025    FINDINGS:  Cutaneous leads project over the chest.  Cardiac silhouette size is upper limit of normal.  No pulmonary edema or confluent airspace disease.  Mild atelectasis right lung base.  No large pleural effusion or pneumothorax.  No acute osseous abnormality.  Impression: No acute pulmonary process.    Electronically signed by: Chin Kingsley  Date:    04/30/2025  Time:    07:43      ECHOCARDIOGRAM RESULTS (last 5)  Results for orders placed during the hospital encounter of 04/08/23    Echo    Interpretation Summary  · The left ventricle is normal in size with concentric remodeling and normal systolic function.  · The " estimated ejection fraction is 60%.  · Normal left ventricular diastolic function.  · Normal right ventricular size with normal right ventricular systolic function.  · Normal central venous pressure (3 mmHg).      Results for orders placed during the hospital encounter of 01/26/21    Echo Color Flow Doppler? Yes    Interpretation Summary  · The left ventricle is normal in size with concentric remodeling and normal systolic function. The estimated ejection fraction is 70%  · Indeterminate left ventricular diastolic function.  · Normal right ventricular size with normal right ventricular systolic function.  · The aortic root is mildly dilated.  · There is mild pulmonary hypertension.      CURRENT/PREVIOUS VISIT EKG  Results for orders placed or performed during the hospital encounter of 04/30/25   EKG 12-lead    Collection Time: 04/30/25  6:59 AM   Result Value Ref Range    QRS Duration 94 ms    [facility] QTC Calculation 465 ms    Narrative    Test Reason : R07.9,    Vent. Rate :  65 BPM     Atrial Rate :  65 BPM     P-R Int : 144 ms          QRS Dur :  94 ms      QT Int : 448 ms       P-R-T Axes :  64  45  57 degrees    QTcB Int : 465 ms    Normal sinus rhythm  Normal ECG  No previous ECGs available  Confirmed by Walter Ahumada ([id number]) on 4/30/2025 9:35:31 PM    Referred By: AAAREFERRAL SELF           Confirmed By: Walter Ahumada           ASSESSMENT/PLAN:     Active Hospital Problems    Diagnosis    *STEMI (ST elevation myocardial infarction)    Hypertension    Severe obesity (BMI 35.0-39.9) with comorbidity    Smoker    Alcohol use    CAD (coronary artery disease)       ASSESSMENT & PLAN:   STEMI s/p PCI distal RCA  CAD  HTN  Anxiety  Hypotension      No ectopy on telemetry overnight  BP soft.  Recommend 250 cc NS bolus  Increased metoprolol tartrate 25 mg BID  Give additional 12.5 mg when BP has stabilized   BB held 2/2 bradycardia  Continue DAPT: Brilinta and ASA  Continue statin therapy  Continue PPI  If BP remains  stable, likely stable for dc this evening, otherwise next am.  He will need cardiac rehab on dc  Follow up with cardiology outpatient in next 2 weeks.         Amelia Quiles NP  [facility]  Department of Cardiology  Date of Service: 05/02/2025        I have personally interviewed and examined the patient, I have reviewed the Nurse Practitioner's history and physical, assessment, and plan.  I have also reviewed and antiplatelet all the pertinent lab and imaging data. I have personally evaluated the patient at bedside and agree with the findings and made appropriate changes as necessary in recommendations.        Dr. Narinder MD  Department of Cardiology  [facility]  Date of Service 5/2/25           [1]   Social History  Tobacco Use    Smoking status: Every Day     Current packs/day: 0.75     Average packs/day: 0.8 packs/day for 39.3 years (29.5 ttl pk-yrs)     Types: Cigarettes     Start date: 1986   Substance Use Topics    Alcohol use: Yes     Comment: 1/4 bottle whiskey every day    Drug use: Yes     Types: Marijuana   [2]   No current facility-administered medications on file prior to encounter.     Current Outpatient Medications on File Prior to Encounter   Medication Sig Dispense Refill    lisinopriL 10 MG tablet Take 1 tablet (10 mg total) by mouth once daily. 90 tablet 0    nitroGLYCERIN (NITROSTAT) 0.4 MG SL tablet Place 1 tablet (0.4 mg total) under the tongue every 5 (five) minutes as needed for Chest pain. 30 tablet 1    polyethylene glycol (GLYCOLAX) 17 gram PwPk Take 17 g by mouth once daily. (Patient taking differently: Take 17 g by mouth every other day.) 30 packet 0    [DISCONTINUED] amLODIPine (NORVASC) 5 MG tablet Take 1 tablet (5 mg total) by mouth once daily. 90 tablet 0    [DISCONTINUED] metoprolol tartrate (LOPRESSOR) 25 MG tablet Take 1 tablet (25 mg total) by mouth 2 (two) times daily. 180 tablet 0    aspirin 81 MG Chew CHEW AND SWALLOW 1 TABLET(81 MG) BY MOUTH  EVERY DAY (Patient taking differently: Take 81 mg by mouth once daily.) 90 tablet 0    [DISCONTINUED] HYDROcodone-acetaminophen (NORCO) 5-325 mg per tablet Take 1 tablet by mouth every 6 (six) hours as needed for Pain. (Patient not taking: Reported on 4/30/2025) 12 tablet 0    [DISCONTINUED] oxyCODONE-acetaminophen (PERCOCET)  mg per tablet Take 1 tablet by mouth every 6 (six) hours as needed for Pain. (Patient not taking: Reported on 4/30/2025) 20 tablet 0

## 2025-05-02 NOTE — PLAN OF CARE
05/02/25 1555   Medicare Message   Important Message from Medicare regarding Discharge Appeal Rights Given to patient/caregiver;Explained to patient/caregiver;Signed/date by patient/caregiver   Date IMM was signed 05/02/25   Time IMM was signed 1028     Reviewed with and signed by pt

## 2025-05-02 NOTE — ASSESSMENT & PLAN NOTE
Body mass index is 34.47 kg/m². Morbid obesity complicates all aspects of disease management from diagnostic modalities to treatment. Weight loss encouraged and health benefits explained to patient.

## 2025-05-02 NOTE — PLAN OF CARE
Pt cleared from CM to home with HH ([facility]).  HH order sent SOC May 3.    Appointments scheduled and added to AVS    No other needs to be addressed at this time   05/02/25 1019   Final Note   Assessment Type Final Discharge Note   Anticipated Discharge Disposition Home-Health   What phone number can be called within the next 1-3 days to see how you are doing after discharge? [phone]   Hospital Resources/Appts/Education Provided Appointments scheduled and added to AVS   Post-Acute Status   Post-Acute Authorization Home Health   Home Health Status Set-up Complete/Auth obtained   Coverage Payor: MEDICARE - MEDICARE PART A & B -   Discharge Delays None known at this time

## 2025-05-02 NOTE — PLAN OF CARE
Safety precautions remain in effect. Patient able to demonstrate use of call light.  Vital signs stable, no fever. Cardiac monitor showing SR.  Dressing on right groin dry and intact.  Pain from CPR controlled with current pain regimen.

## 2025-05-03 ENCOUNTER — NURSE TRIAGE (OUTPATIENT)
Dept: ADMINISTRATIVE | Facility: CLINIC | Age: 65
End: 2025-05-03
Payer: MEDICARE

## 2025-05-03 VITALS
SYSTOLIC BLOOD PRESSURE: 125 MMHG | HEART RATE: 63 BPM | OXYGEN SATURATION: 94 % | WEIGHT: 272.69 LBS | BODY MASS INDEX: 33.91 KG/M2 | RESPIRATION RATE: 32 BRPM | DIASTOLIC BLOOD PRESSURE: 71 MMHG | TEMPERATURE: 98 F | HEIGHT: 75 IN

## 2025-05-03 LAB
ABSOLUTE EOSINOPHIL (SMH): 0.18 K/UL
ABSOLUTE MONOCYTE (SMH): 0.92 K/UL (ref 0.3–1)
ABSOLUTE NEUTROPHIL COUNT (SMH): 3.8 K/UL (ref 1.8–7.7)
ALBUMIN SERPL-MCNC: 3.6 G/DL (ref 3.5–5.2)
ALP SERPL-CCNC: 68 UNIT/L (ref 55–135)
ALT SERPL-CCNC: 26 UNIT/L (ref 10–44)
ANION GAP (SMH): 7 MMOL/L (ref 8–16)
AST SERPL-CCNC: 33 UNIT/L (ref 10–40)
BASOPHILS # BLD AUTO: 0.03 K/UL
BASOPHILS NFR BLD AUTO: 0.5 %
BILIRUB SERPL-MCNC: 0.8 MG/DL (ref 0.1–1)
BUN SERPL-MCNC: 18 MG/DL (ref 8–23)
CALCIUM SERPL-MCNC: 8.7 MG/DL (ref 8.7–10.5)
CHLORIDE SERPL-SCNC: 105 MMOL/L (ref 95–110)
CO2 SERPL-SCNC: 25 MMOL/L (ref 23–29)
CREAT SERPL-MCNC: 0.8 MG/DL (ref 0.5–1.4)
ERYTHROCYTE [DISTWIDTH] IN BLOOD BY AUTOMATED COUNT: 12.4 % (ref 11.5–14.5)
GFR SERPLBLD CREATININE-BSD FMLA CKD-EPI: >60 ML/MIN/1.73/M2
GLUCOSE SERPL-MCNC: 100 MG/DL (ref 70–110)
HCT VFR BLD AUTO: 46.9 % (ref 40–54)
HGB BLD-MCNC: 15.4 GM/DL (ref 14–18)
IMM GRANULOCYTES # BLD AUTO: 0.02 K/UL (ref 0–0.04)
IMM GRANULOCYTES NFR BLD AUTO: 0.3 % (ref 0–0.5)
LYMPHOCYTES # BLD AUTO: 0.95 K/UL (ref 1–4.8)
MAGNESIUM SERPL-MCNC: 2 MG/DL (ref 1.6–2.6)
MCH RBC QN AUTO: 32 PG (ref 27–31)
MCHC RBC AUTO-ENTMCNC: 32.8 G/DL (ref 32–36)
MCV RBC AUTO: 98 FL (ref 82–98)
NUCLEATED RBC (/100WBC) (SMH): 0 /100 WBC
PLATELET # BLD AUTO: 140 K/UL (ref 150–450)
PMV BLD AUTO: 11.1 FL (ref 9.2–12.9)
POTASSIUM SERPL-SCNC: 4.1 MMOL/L (ref 3.5–5.1)
PROT SERPL-MCNC: 6.5 GM/DL (ref 6–8.4)
RBC # BLD AUTO: 4.81 M/UL (ref 4.6–6.2)
RELATIVE EOSINOPHIL (SMH): 3.1 % (ref 0–8)
RELATIVE LYMPHOCYTE (SMH): 16.2 % (ref 18–48)
RELATIVE MONOCYTE (SMH): 15.7 % (ref 4–15)
RELATIVE NEUTROPHIL (SMH): 64.2 % (ref 38–73)
SODIUM SERPL-SCNC: 137 MMOL/L (ref 136–145)
WBC # BLD AUTO: 5.87 K/UL (ref 3.9–12.7)

## 2025-05-03 PROCEDURE — 85025 COMPLETE CBC W/AUTO DIFF WBC: CPT | Performed by: STUDENT IN AN ORGANIZED HEALTH CARE EDUCATION/TRAINING PROGRAM

## 2025-05-03 PROCEDURE — 83735 ASSAY OF MAGNESIUM: CPT | Performed by: STUDENT IN AN ORGANIZED HEALTH CARE EDUCATION/TRAINING PROGRAM

## 2025-05-03 PROCEDURE — 82040 ASSAY OF SERUM ALBUMIN: CPT | Performed by: STUDENT IN AN ORGANIZED HEALTH CARE EDUCATION/TRAINING PROGRAM

## 2025-05-03 PROCEDURE — 25000003 PHARM REV CODE 250: Performed by: STUDENT IN AN ORGANIZED HEALTH CARE EDUCATION/TRAINING PROGRAM

## 2025-05-03 PROCEDURE — 99233 SBSQ HOSP IP/OBS HIGH 50: CPT | Mod: ,,, | Performed by: INTERNAL MEDICINE

## 2025-05-03 PROCEDURE — 94761 N-INVAS EAR/PLS OXIMETRY MLT: CPT

## 2025-05-03 PROCEDURE — 25000003 PHARM REV CODE 250: Performed by: INTERNAL MEDICINE

## 2025-05-03 PROCEDURE — 99900035 HC TECH TIME PER 15 MIN (STAT)

## 2025-05-03 PROCEDURE — 36415 COLL VENOUS BLD VENIPUNCTURE: CPT | Performed by: STUDENT IN AN ORGANIZED HEALTH CARE EDUCATION/TRAINING PROGRAM

## 2025-05-03 PROCEDURE — 25000003 PHARM REV CODE 250

## 2025-05-03 RX ORDER — METOPROLOL TARTRATE 25 MG/1
25 TABLET, FILM COATED ORAL 2 TIMES DAILY
Qty: 180 TABLET | Refills: 0 | Status: SHIPPED | OUTPATIENT
Start: 2025-05-03 | End: 2026-05-03

## 2025-05-03 RX ORDER — TICAGRELOR 90 MG/1
90 TABLET, FILM COATED ORAL 2 TIMES DAILY
Qty: 60 TABLET | Refills: 1 | Status: SHIPPED | OUTPATIENT
Start: 2025-05-03 | End: 2026-05-03

## 2025-05-03 RX ORDER — ATORVASTATIN CALCIUM 80 MG/1
80 TABLET, FILM COATED ORAL DAILY
Qty: 90 TABLET | Refills: 0 | Status: SHIPPED | OUTPATIENT
Start: 2025-05-03 | End: 2025-08-01

## 2025-05-03 RX ORDER — PANTOPRAZOLE SODIUM 40 MG/1
40 TABLET, DELAYED RELEASE ORAL DAILY
Qty: 30 TABLET | Refills: 0 | Status: SHIPPED | OUTPATIENT
Start: 2025-05-03 | End: 2026-05-03

## 2025-05-03 RX ORDER — METOPROLOL TARTRATE 25 MG/1
25 TABLET, FILM COATED ORAL 2 TIMES DAILY
Qty: 180 TABLET | Refills: 0 | Status: SHIPPED | OUTPATIENT
Start: 2025-05-03 | End: 2025-05-03

## 2025-05-03 RX ADMIN — FLUTICASONE PROPIONATE 100 MCG: 50 SPRAY, METERED NASAL at 08:05

## 2025-05-03 RX ADMIN — ACETAMINOPHEN 650 MG: 325 TABLET ORAL at 11:05

## 2025-05-03 RX ADMIN — ATORVASTATIN CALCIUM 80 MG: 40 TABLET, FILM COATED ORAL at 08:05

## 2025-05-03 RX ADMIN — TICAGRELOR 90 MG: 90 TABLET ORAL at 08:05

## 2025-05-03 RX ADMIN — METOPROLOL TARTRATE 25 MG: 25 TABLET, FILM COATED ORAL at 08:05

## 2025-05-03 RX ADMIN — PANTOPRAZOLE SODIUM 40 MG: 40 TABLET, DELAYED RELEASE ORAL at 06:05

## 2025-05-03 RX ADMIN — ASPIRIN 81 MG: 81 TABLET, COATED ORAL at 08:05

## 2025-05-03 RX ADMIN — MUPIROCIN 1 G: 20 OINTMENT TOPICAL at 08:05

## 2025-05-03 NOTE — PROGRESS NOTES
[facility]  Department of Cardiology  Progress Note      PATIENT NAME: Stuart Adler    MRN: [id number]  TODAY'S DATE: 05/03/2025  ADMIT DATE: 4/30/2025                          CONSULT REQUESTED BY: No att. providers found    SUBJECTIVE     PRINCIPAL PROBLEM: STEMI    HPI: 64 year old male with a medical history significant for CAD who presented to the ED with chest pain. Patient found to have ST elevations.  Patient given heparin 5000 units bolus and ASA 325mg po x 1.  Cath lab activated by ED team.         INTERVAL HISTORY:  5/3/25  Resting comfortably in bed; VSS; AM labs are normal; pt has no c/o      5/2/25    Resting in bed. NAD. No acute events on tele.  SR at rest, ST with ambulation; low BP 90/60.    250 cc bolus ordered    05/01/2025  Patient has tenderness to left lateral ribs from compressions.  Creatinine 0.7.  Vital signs are stable.  No ectopy on telemetry.  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    Kidney stones 04/2025    Right eye injury     removed due to skin CA    Skin cancer     Smoker      Past Surgical History:   Procedure Laterality Date    ANGIOGRAM, CORONARY, WITH LEFT HEART CATHETERIZATION N/A 1/28/2022    Procedure: Angiogram, Coronary, with Left Heart Cath;  Surgeon: Nacho Orellana MD;  Location: [facility] CATH/EP LAB;  Service: Cardiology;  Laterality: N/A;    CYSTOSCOPY N/A 4/10/2025    Procedure: CYSTOSCOPY;  Surgeon: Kaleb Gaffney MD;  Location: [facility] OR;  Service: Urology;  Laterality: N/A;    CYSTOSCOPY W/ URETERAL STENT PLACEMENT Right 3/27/2025    Procedure: CYSTOSCOPY, WITH URETERAL STENT INSERTION;  Surgeon: Kaleb Gaffney MD;  Location: [facility] OR;  Service:  Urology;  Laterality: Right;    CYSTOSCOPY W/ URETERAL STENT REMOVAL Bilateral 4/10/2025    Procedure: CYSTOSCOPY, WITH URETERAL STENT REMOVAL;  Surgeon: Kaleb Gaffney MD;  Location: [facility] OR;  Service: Urology;  Laterality: Bilateral;    FACIAL COSMETIC SURGERY      PERCUTANEOUS CORONARY INTERVENTION (PCI) FOR CHRONIC TOTAL OCCLUSION OF CORONARY ARTERY      URETEROSCOPIC REMOVAL OF URETERIC CALCULUS Left 4/10/2025    Procedure: REMOVAL, CALCULUS, URETER, URETEROSCOPIC;  Surgeon: Kaleb Gaffney MD;  Location: [facility] OR;  Service: Urology;  Laterality: Left;    URETEROSCOPY, WITH LASER LITHOTRIPSY Left 4/10/2025    Procedure: URETEROSCOPY, WITH LASER LITHOTRIPSY;  Surgeon: Kaleb Gaffney MD;  Location: [facility] OR;  Service: Urology;  Laterality: Left;     Social History[1]     REVIEW OF SYSTEMS  Per HPI    OBJECTIVE     VITAL SIGNS (Most Recent)  Temp: 98 °F (36.7 °C) (05/03/25 1201)  Pulse: 63 (05/03/25 1201)  Resp: (!) 32 (05/03/25 1201)  BP: 125/71 (05/03/25 1201)  SpO2: (!) 94 % (05/03/25 1201)    VENTILATION STATUS  Resp: (!) 32 (05/03/25 1201)  SpO2: (!) 94 % (05/03/25 1201)           I & O (Last 24H):  Intake/Output Summary (Last 24 hours) at 5/3/2025 1350  Last data filed at 5/3/2025 0925  Gross per 24 hour   Intake 300 ml   Output 800 ml   Net -500 ml       WEIGHTS  Wt Readings from Last 1 Encounters:   05/03/25 0500 123.7 kg (272 lb 11.3 oz)   05/02/25 0501 125.1 kg (275 lb 12.7 oz)   04/30/25 1107 133.3 kg (293 lb 14 oz)   04/30/25 0700 127.6 kg (281 lb 4.9 oz)   04/30/25 0501 127 kg (280 lb)       PHYSICAL EXAM  CONSTITUTIONAL:  Resting comfortably in bed  HEENT:  Left lateral nose lesion;    Postsurgical scars on the right side of the face with right-sided enucleation           NECK:  No JVD no carotid bruit  CVS: S1S2+, RRR  LUNGS: Clear  ABDOMEN: Soft, NT, BS+  EXTREMITIES:  Right groin site is soft, no hematoma or bleeding present  : No calero catheter  NEURO: AAO X 3      HOME  MEDICATIONS:Medications Ordered Prior to Encounter[2]    SCHEDULED MEDS:   aspirin  81 mg Oral Daily    atorvastatin  80 mg Oral Daily    fluticasone propionate  2 spray Each Nostril BID    LIDOcaine (cardiac)  1 mg/kg Intravenous Once    metoprolol tartrate  25 mg Oral BID    mupirocin   Nasal BID    pantoprazole  40 mg Oral Daily    ticagrelor  90 mg Oral BID       CONTINUOUS INFUSIONS:    PRN MEDS:  Current Facility-Administered Medications:     acetaminophen, 650 mg, Oral, Q4H PRN    aluminum-magnesium hydroxide-simethicone, 30 mL, Oral, QID PRN    dextrose 50%, 12.5 g, Intravenous, PRN    dextrose 50%, 25 g, Intravenous, PRN    glucagon (human recombinant), 1 mg, Intramuscular, PRN    glucose, 16 g, Oral, PRN    glucose, 24 g, Oral, PRN    magnesium oxide, 800 mg, Oral, PRN    magnesium oxide, 800 mg, Oral, PRN    melatonin, 3 mg, Oral, Nightly PRN    morphine, 2 mg, Intravenous, Q4H PRN    naloxone, 0.02 mg, Intravenous, PRN    ondansetron, 4 mg, Intravenous, Q6H PRN    polyethylene glycol, 17 g, Oral, Daily PRN    potassium bicarbonate, 35 mEq, Oral, PRN    potassium bicarbonate, 50 mEq, Oral, PRN    potassium bicarbonate, 60 mEq, Oral, PRN    potassium, sodium phosphates, 2 packet, Oral, PRN    potassium, sodium phosphates, 2 packet, Oral, PRN    potassium, sodium phosphates, 2 packet, Oral, PRN    sodium chloride 0.9%, 10 mL, Intravenous, PRN    LABS AND DIAGNOSTICS     CBC LAST 3 DAYS  Recent Labs   Lab 05/01/25  0301 05/02/25  0356 05/03/25  0315   WBC 6.13 5.73 5.87   RBC 4.51* 4.79 4.81   HGB 14.7 15.7 15.4   HCT 44.4 47.0 46.9   MCV 98 98 98   MCH 32.6* 32.8* 32.0*   MCHC 33.1 33.4 32.8   RDW 12.1 12.2 12.4   * 123* 140*   MPV 11.1 11.0 11.1   NRBC 0 0 0       COAGULATION LAST 3 DAYS  Recent Labs   Lab 04/30/25  0458 04/30/25  0736   INR 3.2* 1.1       CHEMISTRY LAST 3 DAYS  Recent Labs   Lab 05/01/25  0301 05/02/25  0356 05/03/25  0315    137 137   K 4.2 4.0 4.1    102 105   CO2 29  "28 25   ANIONGAP 4* 7* 7*   BUN 11 13 18   CREATININE 0.7 0.8 0.8    103 100   CALCIUM 8.5* 8.8 8.7   MG 2.0 2.0 2.0   ALBUMIN 3.5 3.7 3.6   PROT 6.2 6.6 6.5   ALKPHOS 72 73 68   ALT 29 24 26   AST 80* 46* 33   BILITOT 0.6 0.8 0.8       CARDIAC PROFILE LAST 3 DAYS  Recent Labs   Lab 04/30/25  0458   BNP 49       ENDOCRINE LAST 3 DAYS  No results for input(s): "TSH", "PROCAL" in the last 168 hours.    LAST ARTERIAL BLOOD GAS  ABG  No results for input(s): "PH", "PO2", "PCO2", "HCO3", "BE" in the last 168 hours.    LAST 7 DAYS MICROBIOLOGY   Microbiology Results (last 7 days)       ** No results found for the last 168 hours. **            MOST RECENT IMAGING  Echo    Left Ventricle: Left ventricle was not well visualized due to poor   sonic window. The left ventricle is normal in size. Normal wall thickness.   There is normal systolic function with a visually estimated ejection   fraction of 55 - 60%. There is normal diastolic function. E/A ratio is   1.11.    Right Ventricle: The right ventricle is normal in size.    Left Atrium: Mildly dilated    Aortic Valve: There is moderate aortic valve sclerosis. Mildly   calcified cusps.    Mitral Valve: Not well visualized due to poor acoustic window. There is   mild to moderate regurgitation.    Tricuspid Valve: Not well visualized due to poor acoustic window.  X-Ray Chest AP Portable  Narrative: EXAMINATION:  XR CHEST AP PORTABLE    CLINICAL HISTORY:  chest pain;    COMPARISON:  04/10/2025    FINDINGS:  Cutaneous leads project over the chest.  Cardiac silhouette size is upper limit of normal.  No pulmonary edema or confluent airspace disease.  Mild atelectasis right lung base.  No large pleural effusion or pneumothorax.  No acute osseous abnormality.  Impression: No acute pulmonary process.    Electronically signed by: Chin Kingsley  Date:    04/30/2025  Time:    07:43      ECHOCARDIOGRAM RESULTS (last 5)  Results for orders placed during the hospital encounter of " 04/08/23    Echo    Interpretation Summary  · The left ventricle is normal in size with concentric remodeling and normal systolic function.  · The estimated ejection fraction is 60%.  · Normal left ventricular diastolic function.  · Normal right ventricular size with normal right ventricular systolic function.  · Normal central venous pressure (3 mmHg).      Results for orders placed during the hospital encounter of 01/26/21    Echo Color Flow Doppler? Yes    Interpretation Summary  · The left ventricle is normal in size with concentric remodeling and normal systolic function. The estimated ejection fraction is 70%  · Indeterminate left ventricular diastolic function.  · Normal right ventricular size with normal right ventricular systolic function.  · The aortic root is mildly dilated.  · There is mild pulmonary hypertension.      CURRENT/PREVIOUS VISIT EKG  Results for orders placed or performed during the hospital encounter of 04/30/25   EKG 12-lead    Collection Time: 04/30/25  6:59 AM   Result Value Ref Range    QRS Duration 94 ms    [facility] QTC Calculation 465 ms    Narrative    Test Reason : R07.9,    Vent. Rate :  65 BPM     Atrial Rate :  65 BPM     P-R Int : 144 ms          QRS Dur :  94 ms      QT Int : 448 ms       P-R-T Axes :  64  45  57 degrees    QTcB Int : 465 ms    Normal sinus rhythm  Normal ECG  No previous ECGs available  Confirmed by Walter Ahumada ([id number]) on 4/30/2025 9:35:31 PM    Referred By: AAAREFERRAL SELF           Confirmed By: Walter Ahumada           ASSESSMENT/PLAN:     Active Hospital Problems    Diagnosis    *STEMI (ST elevation myocardial infarction)    Hypertension    Severe obesity (BMI 35.0-39.9) with comorbidity    Smoker    Alcohol use    CAD (coronary artery disease)       ASSESSMENT & PLAN:   STEMI s/p PCI distal RCA  CAD  HTN  Anxiety  Hypotension  Nicotine dependence        No ectopy on telemetry overnight  Continue metoprolol tartrate 25 mg BID  Continue DAPT: Brilinta and  ASA  Continue statin therapy  Continue PPI  Smoking cessation counseling reiterated  OK to discharge home from cardiology standpoint  Follow up with cardiology outpatient in next 2 weeks.   Recommend cardiac rehab on dc      Reema Stewart NP  [facility]  Date of Service: 05/03/2025        I have personally interviewed and examined the patient, I have reviewed the Nurse Practitioner's history and physical, assessment, and plan.  I have also reviewed and antiplatelet all the pertinent lab and imaging data. I have personally evaluated the patient at bedside and agree with the findings and made appropriate changes as necessary in recommendations.        Dr. Narinder MD  [facility]  [facility]  Date of Service 5/2/25           [1]   Social History  Tobacco Use    Smoking status: Every Day     Current packs/day: 0.75     Average packs/day: 0.7 packs/day for 39.3 years (29.5 ttl pk-yrs)     Types: Cigarettes     Start date: 1986   Substance Use Topics    Alcohol use: Yes     Comment: 1/4 bottle whiskey every day    Drug use: Yes     Types: Marijuana   [2]   No current facility-administered medications on file prior to encounter.     Current Outpatient Medications on File Prior to Encounter   Medication Sig Dispense Refill    lisinopriL 10 MG tablet Take 1 tablet (10 mg total) by mouth once daily. 90 tablet 0    nitroGLYCERIN (NITROSTAT) 0.4 MG SL tablet Place 1 tablet (0.4 mg total) under the tongue every 5 (five) minutes as needed for Chest pain. 30 tablet 1    polyethylene glycol (GLYCOLAX) 17 gram PwPk Take 17 g by mouth once daily. (Patient taking differently: Take 17 g by mouth every other day.) 30 packet 0    aspirin 81 MG Chew CHEW AND SWALLOW 1 TABLET(81 MG) BY MOUTH EVERY DAY (Patient taking differently: Take 81 mg by mouth once daily.) 90 tablet 0

## 2025-05-03 NOTE — DISCHARGE SUMMARY
[facility]  Discharge Summary      Patient Name: Stuart Adler  MRN: [id number]  JOSE ANGEL: [id number]  Patient Class: IP- Inpatient  Admission Date: 4/30/2025  Hospital Length of Stay: 3 days  Discharge Date and Time: 05/03/2025 10:18 AM  Attending Physician: Cassius Duarte MD   Discharging Provider: Cassius Duarte MD  Primary Care Provider: No primary care provider on file.    Primary Care Team: Networked reference to record PCT     HPI:   The patient is a 64-year-old male with past medical history of hypertension, hyperlipidemia, CAD status post stent ( PCI to mid circumflex in 2022), tobacco abuse who presented to the ED for the evaluation of chest pain.    The history is difficult to take from the patient as the patient is in significant pain.  In the ED, patient received 2 doses of nitro, 4 mg of IV morphine and then 2 additional mg of IV morphine and despite that patient complained of significant pain.  While in the ED, the patient went into torsades, CPR was started and patient had to be shocked 1 time.  The patient was given IV magnesium.    Procedure(s) (LRB):  Left heart cath (Left)  Percutaneous coronary intervention (N/A)  IVUS, Coronary      Hospital Course:   Patient was admitted to Hospital Medicine and emergently taken to cardiac catheterization lab where patient underwent a successful right distal right coronary artery PCI/drug-eluting stent deployment.  Patient tolerated procedure.  Chest pain resolved.  Cardiology team closely followed patient.  Medication compliance especially with Brilinta discussed with patient.  Patient encouraged to follow-up with dermatologist regarding surveillance of basal cell carcinoma of nose. Outpatient cardiac rehab referral completed. Patient has been cleared for DC by cardiology. Patient to continue close follow up with PCP and cardiologist.    Goals of Care Treatment Preferences:  Code Status: Full Code      SDOH Screening:  The patient  was unable to be screened for utility difficulties, food insecurity, transport difficulties, housing insecurity, and interpersonal safety, so no concerns could be identified this admission.     Consults:   Consults (From admission, onward)          Status Ordering Provider     Inpatient consult to   Once        Provider:  (Not yet assigned)    Completed ROBERTO AGUILAR     Inpatient consult to   Once        Provider:  (Not yet assigned)    Completed ROBERTO AGUILAR            Assessment & Plan  STEMI (ST elevation myocardial infarction)  Continue tele monitoring.  Follow Cardiology recommendations.  Continue aspirin and Brilinta.  Medication compliance discussed with the patient.  Post left heart catheterization precautions.  2D echocardiogram results reviewed with ejection fraction 55-60%.  Trend serial cardiac enzymes.  Continue aspirin, Brilinta, statin therapy and current cardiac medications.    Smoker  Smoking cessation counseling performed. Dangers of cigarette smoking were reviewed with patient in detail and patient was encouraged to quit. Nicotine replacement options were discussed for > 3 minutes.      Alcohol use  Monitor for any signs and symptoms for delirium tremens.  Alcohol abstinence counseling performed.    CAD (coronary artery disease)  Patient has prior history of coronary artery disease and acute myocardial infarction and prior history of coronary artery stent placement (PCI with mid circumflex, 2022).  See management of ST-elevation MI.  Severe obesity (BMI 35.0-39.9) with comorbidity  Body mass index is 34.09 kg/m². Morbid obesity complicates all aspects of disease management from diagnostic modalities to treatment. Weight loss encouraged and health benefits explained to patient.       Hypertension  Patient's blood pressure range in the last 24 hours was: BP  Min: 89/55  Max: 141/92.The patient's inpatient anti-hypertensive regimen is listed below:  Current  Antihypertensives  metoprolol tartrate (LOPRESSOR) tablet 25 mg, 2 times daily, Oral    Plan  - BP is controlled, no changes needed to their regimen      Final Active Diagnoses:    Diagnosis Date Noted POA    PRINCIPAL PROBLEM:  STEMI (ST elevation myocardial infarction) [I21.3] 01/28/2022 Yes    Hypertension [I10] 04/30/2025 Yes    Severe obesity (BMI 35.0-39.9) with comorbidity [E66.01] 04/02/2025 Yes    Smoker [F17.200] 01/26/2021 Yes     Chronic    Alcohol use [F10.90] 01/26/2021 Yes     Chronic    CAD (coronary artery disease) [I25.10] 01/26/2021 Yes     Chronic      Problems Resolved During this Admission:       Discharged Condition: good    Disposition: Home or Self Care    Follow Up:   Contact information for follow-up providers       Seth Rivas MD Follow up.    Specialties: Family Medicine, Urgent Care  Why: Can call this office for new patient appointment if you do not hear from someone to establish care. They have a waiting list  Contact information:  [address] [facility] [address]  [phone]               Amelia Quiles NP Follow up on 5/15/2025.    Specialty: Cardiology  Why: Hospital follow up 5/15/25 2:00 pm  Contact information:  [address]  [address]  [facility] [address]  [phone]                       Contact information for after-discharge care       Home Medical Care       [facility] .    Service: Home Health Services  Contact information:  [address] [facility] [address]  [phone]                                 Patient Instructions:      Ambulatory referral/consult to Internal Medicine   Standing Status: Future   Referral Priority: Routine Referral Type: Consultation   Referral Reason: Specialty Services Required   Requested Specialty: Internal Medicine   Number of Visits Requested: 1     Ambulatory referral/consult to [facility]   Standing Status: Future   Referral Priority: Routine Referral Type: Consultation    Referral Reason: Specialty Services Required   Number of Visits Requested: 1     Ambulatory referral/consult to Outpatient Case Management   Referral Priority: Routine Referral Type: Consultation   Referral Reason: Specialty Services Required   Number of Visits Requested: 1     Ambulatory referral/consult to Dermatology   Standing Status: Future   Referral Priority: Urgent Referral Type: Consultation   Referral Reason: Specialty Services Required   Requested Specialty: Dermatology   Number of Visits Requested: 1     Ambulatory referral/consult to Dermatology   Standing Status: Future   Referral Priority: Urgent Referral Type: Consultation   Referral Reason: Specialty Services Required   Requested Specialty: Dermatology   Number of Visits Requested: 1     Ambulatory referral/consult to Home Health   Standing Status: Future   Referral Priority: Routine Referral Type: Home Health Care   Referral Reason: Specialty Services Required   Requested Specialty: Home Health Services   Number of Visits Requested: 1     Diet Cardiac     Notify your health care provider if you experience any of the following:  temperature >100.4     Notify your health care provider if you experience any of the following:  persistent nausea and vomiting or diarrhea     Notify your health care provider if you experience any of the following:  severe uncontrolled pain     Notify your health care provider if you experience any of the following:  redness, tenderness, or signs of infection (pain, swelling, redness, odor or green/yellow discharge around incision site)     Notify your health care provider if you experience any of the following:  difficulty breathing or increased cough     Notify your health care provider if you experience any of the following:  severe persistent headache     Notify your health care provider if you experience any of the following:  worsening rash     Notify your health care provider if you experience any of the  "following:  persistent dizziness, light-headedness, or visual disturbances     Notify your health care provider if you experience any of the following:  increased confusion or weakness     Cardiac rehab phase II   Standing Status: Future Standing Exp. Date: 04/30/26     Order Specific Question Answer Comments   Department [facility] [place]      Activity as tolerated   Order Comments: Fall precautions       Significant Diagnostic Studies: Labs: CMP   Recent Labs   Lab 05/02/25 0356 05/03/25 0315    137   K 4.0 4.1    105   CO2 28 25    100   BUN 13 18   CREATININE 0.8 0.8   CALCIUM 8.8 8.7   PROT 6.6 6.5   ALBUMIN 3.7 3.6   BILITOT 0.8 0.8   ALKPHOS 73 68   AST 46* 33   ALT 24 26   ANIONGAP 7* 7*   , CBC   Recent Labs   Lab 05/02/25 0356 05/03/25 0315   WBC 5.73 5.87   HGB 15.7 15.4   HCT 47.0 46.9   * 140*   , INR   Lab Results   Component Value Date    INR 1.1 04/30/2025    INR 3.2 (H) 04/30/2025    INR 1.1 04/10/2025    PROTIME 11.8 04/30/2025    PROTIME 33.2 (H) 04/30/2025    PROTIME 11.8 04/10/2025   , Lipid Panel   Lab Results   Component Value Date    CHOL 170 05/01/2025    HDL 36 (L) 05/01/2025    LDLCALC 104.0 05/01/2025    TRIG 150 05/01/2025    CHOLHDL 21.2 05/01/2025   , Troponin No results for input(s): "TROPONINI" in the last 168 hours., and A1C: No results for input(s): "HGBA1C" in the last 4320 hours.    Pending Diagnostic Studies:       Procedure Component Value Units Date/Time    Troponin I High Sensitivity [[id number]]     Order Status: Sent Lab Status: No result     Specimen: Blood            Medications:  Reconciled Home Medications:      Medication List        START taking these medications      pantoprazole 40 MG tablet  Commonly known as: PROTONIX  Take 1 tablet (40 mg total) by mouth once daily.     ticagrelor 90 mg tablet  Commonly known as: BRILINTA  Take 1 tablet (90 mg total) by mouth 2 (two) times daily.            CONTINUE taking these " medications      aspirin 81 MG Chew  CHEW AND SWALLOW 1 TABLET(81 MG) BY MOUTH EVERY DAY     atorvastatin 80 MG tablet  Commonly known as: LIPITOR  Take 1 tablet (80 mg total) by mouth once daily.     lisinopriL 10 MG tablet  Take 1 tablet (10 mg total) by mouth once daily.     metoprolol tartrate 25 MG tablet  Commonly known as: LOPRESSOR  Take 1 tablet (25 mg total) by mouth 2 (two) times daily.     nitroGLYCERIN 0.4 MG SL tablet  Commonly known as: NITROSTAT  Place 1 tablet (0.4 mg total) under the tongue every 5 (five) minutes as needed for Chest pain.     polyethylene glycol 17 gram Pwpk  Commonly known as: GLYCOLAX  Take 17 g by mouth once daily.            STOP taking these medications      amLODIPine 5 MG tablet  Commonly known as: NORVASC     HYDROcodone-acetaminophen 5-325 mg per tablet  Commonly known as: NORCO     oxyCODONE-acetaminophen  mg per tablet  Commonly known as: PERCOCET              Indwelling Lines/Drains at time of discharge:   Lines/Drains/Airways       None                   Time spent on the discharge of patient: 33 minutes    Cassius Duarte MD  Department of Hospital Medicine  [facility]

## 2025-05-03 NOTE — ASSESSMENT & PLAN NOTE
Body mass index is 34.09 kg/m². Morbid obesity complicates all aspects of disease management from diagnostic modalities to treatment. Weight loss encouraged and health benefits explained to patient.

## 2025-05-03 NOTE — TELEPHONE ENCOUNTER
Patient was discharged from hospital and his pharmacy closed at 1pm. Requesting prescriptions for Lopressor, Protonix, Lipitor and Brilinta be sent to  [facility] on [address] instead since they are open.  Disposition provided - Call MD now. Sent secure chat to discharge provider, Dr Duarte. Prescriptions were sent to [facility]'s per patient request. Updated patient and family member. Instructed to call back with additional questions or worsening of symptoms. Patient verbalized understanding.     Reason for Disposition   [1] Prescription not at pharmacy AND [2] was prescribed by PCP recently  (Exception: [facility] has access to EMR and prescription is recorded there. Go to Home Care and confirm for pharmacy.)    Protocols used: Medication Refill and Renewal Call-A-

## 2025-05-03 NOTE — NURSING
Patient discharged home with home health and follow up appointment with cardiologist. Discharge instructions reviewed with patient at bedside. All IVs and continuous monitoring devices removed from patient. VSS and WDL at time of discharge.     Patient opted to wait in main entrance lobby for his ride to arrive.

## 2025-05-03 NOTE — CARE UPDATE
05/03/25 0706   Patient Assessment/Suction   Level of Consciousness (AVPU) alert   Respiratory Effort Normal;Unlabored   Expansion/Accessory Muscles/Retractions no use of accessory muscles;no retractions;expansion symmetric   All Lung Fields Breath Sounds clear;equal bilaterally   Rhythm/Pattern, Respiratory unlabored;pattern regular;depth regular;no shortness of breath reported   Cough Frequency no cough   PRE-TX-O2   Device (Oxygen Therapy) room air   SpO2 (!) 94 %   Pulse Oximetry Type Continuous   $ Pulse Oximetry - Multiple Charge Pulse Oximetry - Multiple   Pulse 62   Resp 18   Respiratory Evaluation   $ Care Plan Tech Time 15 min

## 2025-05-03 NOTE — RESPIRATORY THERAPY
05/02/25 [facility]   Patient Assessment/Suction   Level of Consciousness (AVPU) alert   PRE-TX-O2   Device (Oxygen Therapy) room air   SpO2 (!) 93 %   Pulse Oximetry Type Continuous   $ Pulse Oximetry - Multiple Charge Pulse Oximetry - Multiple   Pulse (!) 55   Resp (!) 21   Education   $ Education 15 min

## 2025-05-03 NOTE — ASSESSMENT & PLAN NOTE
Patient's blood pressure range in the last 24 hours was: BP  Min: 89/55  Max: 141/92.The patient's inpatient anti-hypertensive regimen is listed below:  Current Antihypertensives  metoprolol tartrate (LOPRESSOR) tablet 25 mg, 2 times daily, Oral    Plan  - BP is controlled, no changes needed to their regimen

## 2025-05-03 NOTE — PLAN OF CARE
Pt cleared from CM to home with HH ([facility]).  HH order sent SOC May 3.     Appointments scheduled and added to AVS          05/03/25 0948   Final Note   Assessment Type Final Discharge Note   Anticipated Discharge Disposition [facility] Resources/Appts/Education Provided Provided patient/caregiver with written discharge plan information;Provided education on problems/symptoms using teachback;Appointments scheduled and added to AVS   Post-Acute Status   Post-Acute Authorization Home [facility]   Home Health Status Set-up Complete/Auth obtained   Discharge Delays None known at this time

## 2025-05-06 ENCOUNTER — PATIENT OUTREACH (OUTPATIENT)
Dept: ADMINISTRATIVE | Facility: OTHER | Age: 65
End: 2025-05-06
Payer: MEDICARE

## 2025-05-06 ENCOUNTER — OFFICE VISIT (OUTPATIENT)
Dept: HOME HEALTH SERVICES | Facility: CLINIC | Age: 65
End: 2025-05-06
Payer: MEDICARE

## 2025-05-06 DIAGNOSIS — I21.3 ST ELEVATION MYOCARDIAL INFARCTION (STEMI), UNSPECIFIED ARTERY: ICD-10-CM

## 2025-05-06 DIAGNOSIS — M87.30 OSTEORADIONECROSIS: ICD-10-CM
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
## 2025-05-06 NOTE — PROGRESS NOTES
[facility] [place]  Transitional Care Management (TCM) Home Visit    Encounter Provider: Rick Soler   PCP: No primary care provider on file.  Consult Requested By: Dr. Cassius Duarte  Admit Date: 4/30/25   IP Discharge Date: 5/3/25  Hospital Length of Stay:RRHLOS@ days  Days since discharge (from IP or SNF): 3   [facility] On Call Contact Note: yes date 5/1/25   Hospital Diagnosis: ST elevation myocardial infarction (STEMI), unspecified artery [I21.3]     HISTORY OF PRESENT ILLNESS      Patient ID: Stuart Adler is a 64 y.o. male was recently admitted to the hospital, this is their TCM encounter.    Hospital Course Synopsis:    Patient was admitted to Hospital Medicine and emergently taken to cardiac catheterization lab where patient underwent a successful right distal right coronary artery PCI/drug-eluting stent deployment.  Patient tolerated procedure.  Chest pain resolved.  Cardiology team closely followed patient.  Medication compliance especially with Brilinta discussed with patient.  Patient encouraged to follow-up with dermatologist regarding surveillance of basal cell carcinoma of nose. Outpatient cardiac rehab referral completed. Patient has been cleared for DC by cardiology. Patient to continue close follow up with PCP and cardiologist.     Pt at home, denies chest pain, states he actually feels like he has more energy,   Pt taking meds as directed,   Has follow ups scheduled.         DECISION MAKING TODAY       Assessment & Plan:  1. ST elevation myocardial infarction (STEMI), unspecified artery  Assessment & Plan:  Pt stable at home at this time,  Denies chest pain, blood pressure within normal limits 125/71  Getting around the house without issue, states soreness of chest from compressions,   Lungs clear,     Keep cardiology follow up, monitor bp,   Continue meds as directed.     Orders:  -     Ambulatory referral/consult to [facility]    2. Osteoradionecrosis  Assessment & Plan:  Chronic  and stable condition,  Pt followed by oncology,   Pt has prothesis in place,  no current issues,     Keep follow up as directed.            Medication List on Discharge:     Medication List            Accurate as of May 6, 2025  7:06 PM. If you have any questions, ask your nurse or doctor.                CHANGE how you take these medications      aspirin 81 MG Chew  CHEW AND SWALLOW 1 TABLET(81 MG) BY MOUTH EVERY DAY  What changed: See the new instructions.     polyethylene glycol 17 gram Pwpk  Commonly known as: GLYCOLAX  Take 17 g by mouth once daily.  What changed: when to take this            CONTINUE taking these medications      atorvastatin 80 MG tablet  Commonly known as: LIPITOR  Take 1 tablet (80 mg total) by mouth once daily.     lisinopriL 10 MG tablet  Take 1 tablet (10 mg total) by mouth once daily.     metoprolol tartrate 25 MG tablet  Commonly known as: LOPRESSOR  Take 1 tablet (25 mg total) by mouth 2 (two) times daily.     nitroGLYCERIN 0.4 MG SL tablet  Commonly known as: NITROSTAT  Place 1 tablet (0.4 mg total) under the tongue every 5 (five) minutes as needed for Chest pain.     pantoprazole 40 MG tablet  Commonly known as: PROTONIX  Take 1 tablet (40 mg total) by mouth once daily.     ticagrelor 90 mg tablet  Commonly known as: BRILINTA  Take 1 tablet (90 mg total) by mouth 2 (two) times daily.              Medication Reconciliation:  Were medications changed on discharge? Yes  Were medications in the home? Yes  Is the patient taking the medications as directed? Yes  Does the patient understand the medications and changes? Yes  Does updated med list accurately reflects meds patient is currently taking? Yes    ENVIRONMENT OF CARE      Family and/or Caregiver present at visit?  No  Name of Caregiver:   History provided by: patient    Advance Care Planning   Advanced Care Planning Status:  Patient has had an ACP conversation  Living Will: No  Power of : No  [place]: No    Does Caregiver  have HCPoA: No  Changes today: no  Is patient hospice appropriate: No  (If needed, use PPS <30 or FAST score >7)  Was referral to hospice placed: No       Impression upon entering the home:  Physical Dwelling: single family home   Appearance of home environment: cleaniness: dirty, walking pathways: cluttered, lighting: inadequate, and home structure: sound structure  Functional Status: independent  Mobility: ambulatory  Nutritional access: adequate intake and access  Home Health: No, and does not need it at this time   DME/Supplies:       Diagnostic tests reviewed/disposition: No diagnosic tests pending after this hospitalization.  Disease/illness education: CAD  Establishment or re-establishment of referral orders for community resources: No other necessary community resources.   Discussion with other health care providers: No discussion with other health care providers necessary.   Does patient have a PCP at [facility]? Yes   Repatriation plan with PCP? follow-up with PCP within 30d   Does patient have an ostomy (ileostomy, colostomy, suprapubic catheter, nephrostomy tube, tracheostomy, PEG tube, pleurex catheter, cholecystostomy, etc)? No  Were BPAs reviewed? Yes    Social History     Socioeconomic History    Marital status: Single   Tobacco Use    Smoking status: Every Day     Current packs/day: 0.75     Average packs/day: 0.8 packs/day for 39.3 years (29.5 ttl pk-yrs)     Types: Cigarettes     Start date: 1986   Substance and Sexual Activity    Alcohol use: Yes     Comment: 1/4 bottle whiskey every day    Drug use: Yes     Types: Marijuana     Social Drivers of Health     Financial Resource Strain: Patient Unable To Answer (4/30/2025)    Overall Financial Resource Strain (CARDIA)     Difficulty of Paying Living Expenses: Patient unable to answer   Recent Concern: Financial Resource Strain - High Risk (4/8/2025)    Overall Financial Resource Strain (CARDIA)     Difficulty of Paying Living Expenses: Hard   Food  Insecurity: Patient Unable To Answer (4/30/2025)    Hunger Vital Sign     Worried About Running Out of Food in the Last Year: Patient unable to answer     Ran Out of Food in the Last Year: Patient unable to answer   Recent Concern: Food Insecurity - Food Insecurity Present (4/8/2025)    Hunger Vital Sign     Worried About Running Out of Food in the Last Year: Sometimes true     Ran Out of Food in the Last Year: Sometimes true   Transportation Needs: Patient Unable To Answer (4/30/2025)    PRAPARE - Transportation     Lack of Transportation (Medical): Patient unable to answer     Lack of Transportation (Non-Medical): Patient unable to answer   Recent Concern: Transportation Needs - Unmet Transportation Needs (4/8/2025)    PRAPARE - Transportation     Lack of Transportation (Medical): Yes     Lack of Transportation (Non-Medical): Yes   Physical Activity: Inactive (4/10/2023)    Exercise Vital Sign     Days of Exercise per Week: 0 days     Minutes of Exercise per Session: 0 min   Stress: Patient Unable To Answer (4/30/2025)    [employer] - Occupational Stress Questionnaire     Feeling of Stress : Patient unable to answer   Housing Stability: Patient Unable To Answer (4/30/2025)    Housing Stability Vital Sign     Unable to Pay for Housing in the Last Year: Patient unable to answer     Homeless in the Last Year: Patient unable to answer   Recent Concern: Housing Stability - High Risk (4/8/2025)    Housing Stability Vital Sign     Unable to Pay for Housing in the Last Year: Yes     Homeless in the Last Year: No       OBJECTIVE:     Vital Signs:  There were no vitals filed for this visit.    Review of Systems    Physical Exam:  Physical Exam  Vitals and nursing note reviewed.   Constitutional:       Appearance: Normal appearance. He is obese.   HENT:      Head: Normocephalic and atraumatic.      Nose: Nose normal.   Eyes:      Extraocular Movements: Extraocular movements intact.      Pupils:  Pupils are equal, round, and reactive to light.   Cardiovascular:      Rate and Rhythm: Normal rate. Rhythm irregular.   Pulmonary:      Effort: Pulmonary effort is normal.      Breath sounds: Normal breath sounds.   Musculoskeletal:         General: Normal range of motion.      Cervical back: Normal range of motion.   Skin:     General: Skin is warm and dry.   Neurological:      General: No focal deficit present.      Mental Status: He is alert and oriented to person, place, and time.   Psychiatric:         Mood and Affect: Mood normal.         Behavior: Behavior normal.         Thought Content: Thought content normal.         Judgment: Judgment normal.         INSTRUCTIONS FOR PATIENT:     Scheduled Follow-up, Appts Reviewed with Modifications if Needed: Yes  Future Appointments   Date Time Provider Department Center   5/15/2025 11:00 AM Amelia Quiles NP [facility]     I have spent 40 minutes with this patient This includes face to face time and non-face to face time preparing to see the patient (eg, review of tests), obtaining and/or reviewing separately obtained history, documenting clinical information in the electronic or other health record, independently interpreting results and communicating results to the patient/family/caregiver, or care coordinator.    Signature: TABBY Garcia      Transition of Care Visit:  I have reviewed and updated the history and problem list.  I have reconciled the medication list.  I have discussed the hospitalization and current medical issues, prognosis and plans with the patient/family.

## 2025-05-06 NOTE — PROGRESS NOTES
CHW - Follow Up    This Community Health Worker completed a follow up visit with patient via telephone today.  Pt/Caregiver reported: never received tests with information. Did contact and obtained resources fro [employer].  Community Health Worker provided: Provided same resources sent via text so patient can write down.  Follow up required:   No future outreach task assigned

## 2025-05-07 ENCOUNTER — DOCUMENT SCAN (OUTPATIENT)
Dept: HOME HEALTH SERVICES | Facility: HOSPITAL | Age: 65
End: 2025-05-07
Payer: MEDICARE

## 2025-05-07 NOTE — ASSESSMENT & PLAN NOTE
Pt stable at home at this time,  Denies chest pain, blood pressure within normal limits 125/71  Getting around the house without issue, states soreness of chest from compressions,   Lungs clear,     Keep cardiology follow up, monitor bp,   Continue meds as directed.

## 2025-05-07 NOTE — ASSESSMENT & PLAN NOTE
Chronic and stable condition,  Pt followed by oncology,   Pt has prothesis in place,  no current issues,     Keep follow up as directed.

## 2025-06-01 PROCEDURE — G0179 MD RECERTIFICATION HHA PT: HCPCS | Mod: ,,, | Performed by: NURSE PRACTITIONER

## 2025-06-13 ENCOUNTER — DOCUMENT SCAN (OUTPATIENT)
Dept: HOME HEALTH SERVICES | Facility: HOSPITAL | Age: 65
End: 2025-06-13
Payer: MEDICARE

## 2025-06-13 ENCOUNTER — EXTERNAL HOME HEALTH (OUTPATIENT)
Dept: HOME HEALTH SERVICES | Facility: HOSPITAL | Age: 65
End: 2025-06-13
Payer: MEDICARE

## 2025-07-01 ENCOUNTER — OFFICE VISIT (OUTPATIENT)
Dept: FAMILY MEDICINE | Facility: CLINIC | Age: 65
End: 2025-07-01
Payer: MEDICARE

## 2025-07-01 VITALS
SYSTOLIC BLOOD PRESSURE: 126 MMHG | BODY MASS INDEX: 35.34 KG/M2 | OXYGEN SATURATION: 97 % | DIASTOLIC BLOOD PRESSURE: 70 MMHG | HEIGHT: 75 IN | WEIGHT: 284.19 LBS | TEMPERATURE: 98 F | HEART RATE: 60 BPM

## 2025-07-01 DIAGNOSIS — F17.219 NICOTINE DEPENDENCE, CIGARETTES, WITH UNSPECIFIED NICOTINE-INDUCED DISORDERS: ICD-10-CM

## 2025-07-01 DIAGNOSIS — I21.3 ST ELEVATION MYOCARDIAL INFARCTION (STEMI), UNSPECIFIED ARTERY: ICD-10-CM

## 2025-07-01 DIAGNOSIS — I25.10 CORONARY ARTERY DISEASE INVOLVING NATIVE CORONARY ARTERY OF NATIVE HEART WITHOUT ANGINA PECTORIS: Chronic | ICD-10-CM

## 2025-07-01 DIAGNOSIS — F33.1 MODERATE RECURRENT MAJOR DEPRESSION: ICD-10-CM

## 2025-07-01 DIAGNOSIS — C44.310 FACIAL BASAL CELL CANCER: Chronic | ICD-10-CM

## 2025-07-01 DIAGNOSIS — F41.9 ANXIETY: ICD-10-CM

## 2025-07-01 DIAGNOSIS — I10 PRIMARY HYPERTENSION: ICD-10-CM

## 2025-07-01 DIAGNOSIS — R73.9 HYPERGLYCEMIA: ICD-10-CM

## 2025-07-01 DIAGNOSIS — Z76.89 ESTABLISHING CARE WITH NEW DOCTOR, ENCOUNTER FOR: Primary | ICD-10-CM

## 2025-07-01 LAB — HBA1C MFR BLD: 5.5 %

## 2025-07-01 PROCEDURE — 83036 HEMOGLOBIN GLYCOSYLATED A1C: CPT | Mod: PBBFAC,PN | Performed by: STUDENT IN AN ORGANIZED HEALTH CARE EDUCATION/TRAINING PROGRAM

## 2025-07-01 PROCEDURE — 99214 OFFICE O/P EST MOD 30 MIN: CPT | Mod: PBBFAC,PN | Performed by: STUDENT IN AN ORGANIZED HEALTH CARE EDUCATION/TRAINING PROGRAM

## 2025-07-01 PROCEDURE — 99999 PR PBB SHADOW E&M-EST. PATIENT-LVL IV: CPT | Mod: PBBFAC,,, | Performed by: STUDENT IN AN ORGANIZED HEALTH CARE EDUCATION/TRAINING PROGRAM

## 2025-07-01 PROCEDURE — 99999PBSHW POCT HEMOGLOBIN A1C: Mod: PBBFAC,,,

## 2025-07-01 RX ORDER — ATORVASTATIN CALCIUM 80 MG/1
80 TABLET, FILM COATED ORAL DAILY
Qty: 90 TABLET | Refills: 3 | Status: SHIPPED | OUTPATIENT
Start: 2025-07-01 | End: 2026-06-26

## 2025-07-01 RX ORDER — ESCITALOPRAM OXALATE 10 MG/1
10 TABLET ORAL DAILY
Qty: 30 TABLET | Refills: 2 | Status: SHIPPED | OUTPATIENT
Start: 2025-07-01 | End: 2025-09-29

## 2025-07-01 RX ORDER — TICAGRELOR 90 MG/1
90 TABLET, FILM COATED ORAL 2 TIMES DAILY
Qty: 60 TABLET | Refills: 0 | Status: SHIPPED | OUTPATIENT
Start: 2025-07-01 | End: 2025-07-31

## 2025-07-01 NOTE — ASSESSMENT & PLAN NOTE
Chronic problem.     Smoking cessation counseling:  Approximately 4 minutes of this encounter was spent on smoking cessation counseling.  Our discussion included, but was not limited to the following: We discussed the dose-dependent risk of smoking with associated malignancy.  We also discussed the parabolic curve of associated cardiovascular risk with the highest increased risk being seen within the first 2 cigarettes a day.  We also discussed wound healing complications associated with tobacco smoking.  Additionally, we discussed medication assisted therapy which included Chantix as preferred therapy. Patient will attempt slow taper approach.

## 2025-07-01 NOTE — ASSESSMENT & PLAN NOTE
Chronic problem noted during recent hospitalization. Continue close follow up with Cardiology.  Continue atorvastatin, aspirin, metoprolol, and Brilinta.

## 2025-07-01 NOTE — ASSESSMENT & PLAN NOTE
Chronic problem stable based on  in-clinic vitals.   Continue current regimen including Lisinopril and metoprolol.

## 2025-07-01 NOTE — PROGRESS NOTES
Subjective      tSuart Adler is a 64 y.o. male        Chief Complaint   Patient presents with    Establish Care        HPI     Patient presents today to establish care.      Problem Noted   Hyperglycemia 7/1/2025    Patient with  hyperglycemia, which was noted on previous lab work.  Glucose 137 on 4/30/25 via chart review.        Moderate Recurrent Major Depression 7/1/2025    Patient with Anxiety and Depression which is currently being treated without medications.  Patient reports that their symptoms are not controlled. No reported SI/HI at today's visit. Mood is noted to be good. Patient denies any manic or hypomanic episodes in the past.     Last Recorded PHQ9:      Little interest or pleasure in doing things: Several days  Feeling down, depressed, or hopeless: Several days  Trouble falling or staying asleep, or sleeping too much: Nearly every day  Feeling tired or having little energy: Nearly every day  Poor appetite or overeating: Several days  Feeling bad about yourself - or that you are a failure or have let yourself or your family down: More than half the days  Trouble concentrating on things, such as reading the newspaper or watching television: Nearly every day  Moving or speaking so slowly that other people could have noticed. Or the opposite - being so fidgety or restless that you have been moving around a lot more than usual: Several days  Thoughts that you would be better off dead, or of hurting yourself in some way: Not at all  PHQ-9 Total Score: 15  If you checked off any problems, how difficult have these problems made it for you to do your work, take care of things at home, or get along with other people?: Very difficult  PHQ-9 Total Score: 15        Anxiety 7/1/2025    Patient with Anxiety and Depression which is currently being treated without medications.  Patient reports that their symptoms are not controlled. No reported SI/HI at today's visit. Mood is noted to be good. Patient denies any  manic or hypomanic episodes in the past.     Last Recorded BRANDI 7:    BRANDI-7 Questionnaire      7/1/2025     1:07 PM   BRANDI-7   Was test performed? Yes   1. Feeling nervous, anxious, or on edge? Several days   2. Not being able to stop or control worrying? Nearly everyday   3. Worrying too much about different things? Nearly everyday   4. Trouble relaxing? More than half the days   5. Being so restless that it is hard to sit still? Not at all   6. Becoming easily annoyed or irritable? Not at all   7. Feeling afraid as if something awful might happen? More than half the days   8. If you checked off any problems, how difficult have these problems made it for you to do your work, take care of things at home, or get along with other people? Very difficult   BRANDI-7 Score 11   Number answered (out of first 7) 7   Interpretation Moderate Anxiety           Stemi (St Elevation Myocardial Infarction) 1/28/2022   Nicotine Dependence, Cigarettes, With Unspecified Nicotine-Induced Disorders 1/26/2021    Patient with tobacco abuse. The patient reports smoking 1.0 packs per day for 40 years with decreased 0.5 ppd currently for the past year with a desire to quit.          Hypertension 1/26/2021    Patient with Hypertension. The patient does not routinely check their blood pressure at home and reports compliance with lisinopril and metoprolol..     Wt Readings from Last 3 Encounters:   07/01/25 128.9 kg (284 lb 2.8 oz)   05/03/25 123.7 kg (272 lb 11.3 oz)   04/10/25 129.3 kg (285 lb)     Temp Readings from Last 3 Encounters:   07/01/25 97.9 °F (36.6 °C) (Oral)   05/03/25 98 °F (36.7 °C) (Oral)   04/10/25 97.5 °F (36.4 °C) (Oral)     BP Readings from Last 3 Encounters:   07/01/25 126/70   05/03/25 125/71   04/10/25 (!) 148/87     Pulse Readings from Last 3 Encounters:   07/01/25 60   05/03/25 63   04/10/25 60               ALLERGIES  Patient has no known allergies.     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    Kidney stones 04/2025    Right eye injury     removed due to skin CA    Skin cancer     Smoker         PAST SURGIAL HISTORY  Past Surgical History:   Procedure Laterality Date    ANGIOGRAM, CORONARY, WITH LEFT HEART CATHETERIZATION N/A 1/28/2022    Procedure: Angiogram, Coronary, with Left Heart Cath;  Surgeon: Nacho Orellana MD;  Location: [facility] CATH/EP LAB;  Service: Cardiology;  Laterality: N/A;    CYSTOSCOPY N/A 4/10/2025    Procedure: CYSTOSCOPY;  Surgeon: Kaleb Gaffney MD;  Location: [facility] OR;  Service: Urology;  Laterality: N/A;    CYSTOSCOPY W/ URETERAL STENT PLACEMENT Right 3/27/2025    Procedure: CYSTOSCOPY, WITH URETERAL STENT INSERTION;  Surgeon: Kaleb Gaffney MD;  Location: [facility] OR;  Service: Urology;  Laterality: Right;    CYSTOSCOPY W/ URETERAL STENT REMOVAL Bilateral 4/10/2025    Procedure: CYSTOSCOPY, WITH URETERAL STENT REMOVAL;  Surgeon: Kaleb Gaffney MD;  Location: [facility] OR;  Service: Urology;  Laterality: Bilateral;    FACIAL COSMETIC SURGERY      IVUS, CORONARY  4/30/2025    Procedure: IVUS, Coronary;  Surgeon: Maria Luisa Barcenas MD;  Location: [facility] CATH/EP LAB;  Service: Cardiology;;    LEFT HEART CATHETERIZATION Left 4/30/2025    Procedure: Left heart cath;  Surgeon: Maria Luisa Barcenas MD;  Location: [facility] CATH/EP LAB;  Service: Cardiology;  Laterality: Left;    PERCUTANEOUS CORONARY INTERVENTION (PCI) FOR CHRONIC TOTAL OCCLUSION OF CORONARY ARTERY      PERCUTANEOUS CORONARY INTERVENTION, ARTERY N/A 4/30/2025    Procedure: Percutaneous coronary intervention;  Surgeon: Maria Luisa Barcenas MD;  Location: [facility] CATH/EP LAB;  Service: Cardiology;  Laterality: N/A;    URETEROSCOPIC REMOVAL OF URETERIC CALCULUS Left 4/10/2025    Procedure: REMOVAL, CALCULUS, URETER, URETEROSCOPIC;  Surgeon: Yeimy  MD Kaleb;  Location: [facility];  Service: Urology;  Laterality: Left;    URETEROSCOPY, WITH LASER LITHOTRIPSY Left 4/10/2025    Procedure: URETEROSCOPY, WITH LASER LITHOTRIPSY;  Surgeon: Kaleb Gaffney MD;  Location: [facility];  Service: Urology;  Laterality: Left;        FAMILY HISTORY  Family History   Problem Relation Name Age of Onset    Heart disease Father          PAST SOCIAL HISTORY  Social History     Socioeconomic History    Marital status: Single   Tobacco Use    Smoking status: Every Day     Current packs/day: 0.75     Average packs/day: 0.7 packs/day for 39.5 years (29.6 ttl pk-yrs)     Types: Cigarettes     Start date: 1986   Substance and Sexual Activity    Alcohol use: Yes     Comment: 1/4 bottle whiskey every day    Drug use: Yes     Types: Marijuana     Social Drivers of Health     Financial Resource Strain: Patient Unable To Answer (4/30/2025)    Overall Financial Resource Strain (CARDIA)     Difficulty of Paying Living Expenses: Patient unable to answer   Recent Concern: Financial Resource Strain - High Risk (4/8/2025)    Overall Financial Resource Strain (CARDIA)     Difficulty of Paying Living Expenses: Hard   Food Insecurity: Patient Unable To Answer (4/30/2025)    Hunger Vital Sign     Worried About Running Out of Food in the Last Year: Patient unable to answer     Ran Out of Food in the Last Year: Patient unable to answer   Recent Concern: Food Insecurity - Food Insecurity Present (4/8/2025)    Hunger Vital Sign     Worried About Running Out of Food in the Last Year: Sometimes true     Ran Out of Food in the Last Year: Sometimes true   Transportation Needs: Patient Unable To Answer (4/30/2025)    PRAPARE - Transportation     Lack of Transportation (Medical): Patient unable to answer     Lack of Transportation (Non-Medical): Patient unable to answer   Recent Concern: Transportation Needs - Unmet Transportation Needs (4/8/2025)    PRAPARE - Transportation     Lack of Transportation  "(Medical): Yes     Lack of Transportation (Non-Medical): Yes   Physical Activity: Inactive (4/10/2023)    Exercise Vital Sign     Days of Exercise per Week: 0 days     Minutes of Exercise per Session: 0 min   Stress: Patient Unable To Answer (4/30/2025)    [employer] - Occupational Stress Questionnaire     Feeling of Stress : Patient unable to answer   Housing Stability: Patient Unable To Answer (4/30/2025)    Housing Stability Vital Sign     Unable to Pay for Housing in the Last Year: Patient unable to answer     Homeless in the Last Year: Patient unable to answer   Recent Concern: Housing Stability - High Risk (4/8/2025)    Housing Stability Vital Sign     Unable to Pay for Housing in the Last Year: Yes     Homeless in the Last Year: No        Health Maintenance   Topic Date Due    TETANUS VACCINE  Never done    Shingles Vaccine (1 of 2) Never done    Colorectal Cancer Screening  Never done    Pneumococcal Vaccines (Age 50+) (2 of 2 - PCV) 12/04/2019    RSV Vaccine (Age 60+ and Pregnant patients) (1 - Risk 60-74 years 1-dose series) Never done    COVID-19 Vaccine (3 - Moderna risk series) 06/10/2021    LDCT Lung Screen  01/27/2023    Influenza Vaccine (1) 09/01/2025    High Dose Statin  07/01/2026    Hemoglobin A1c (Diabetic Prevention Screening)  07/01/2028    Lipid Panel  05/01/2030    Hepatitis C Screening  Completed    HIV Screening  Completed           ROS        Objective   Vitals:    07/01/25 1311   BP: 126/70   BP Location: Right arm   Patient Position: Sitting   Pulse: 60   Temp: 97.9 °F (36.6 °C)   TempSrc: Oral   SpO2: 97%   Weight: 128.9 kg (284 lb 2.8 oz)   Height: 6' 3" (1.905 m)       Body mass index is 35.52 kg/m².       Physical Exam  Constitutional:       General: He is not in acute distress.     Appearance: He is obese. He is not ill-appearing.   HENT:      Head: Normocephalic and atraumatic.      Comments: Large right facial flap.   Eyes:      Pupils: Pupils are " equal, round, and reactive to light.   Cardiovascular:      Rate and Rhythm: Normal rate and regular rhythm.   Pulmonary:      Effort: Pulmonary effort is normal.      Breath sounds: Normal breath sounds.   Abdominal:      Palpations: Abdomen is soft.   Musculoskeletal:         General: Normal range of motion.      Cervical back: Normal range of motion.   Skin:     General: Skin is warm and dry.      Findings: Lesion (noted over left nostril) present.   Neurological:      General: No focal deficit present.      Mental Status: He is alert and oriented to person, place, and time. Mental status is at baseline.   Psychiatric:         Mood and Affect: Mood normal.         Thought Content: Thought content normal.          Establishing care with new doctor, encounter for    Hyperglycemia  Assessment & Plan:  Chronic problem not well controlled  based on previous metabolic panel, which I reviewed.  POC A1c obtained today reviewed with the patient without signs of prediabetes or diabetes.       Orders:  -     Hemoglobin A1C, POCT    ST elevation myocardial infarction (STEMI), unspecified artery  Assessment & Plan:  Chronic problem noted during recent hospitalization. Continue close follow up with Cardiology.  Continue atorvastatin, aspirin, metoprolol, and Brilinta.      Orders:  -     Ambulatory referral/consult to Internal Medicine    Facial basal cell cancer  -     Ambulatory referral/consult to Internal Medicine    Coronary artery disease involving native coronary artery of native heart without angina pectoris  -     ticagrelor (BRILINTA) 90 mg tablet; Take 1 tablet (90 mg total) by mouth 2 (two) times daily.  Dispense: 60 tablet; Refill: 0  -     atorvastatin (LIPITOR) 80 MG tablet; Take 1 tablet (80 mg total) by mouth once daily.  Dispense: 90 tablet; Refill: 3    Moderate recurrent major depression  Assessment & Plan:  Chronic problem with progression not well controlled  per patient  and moderate based on  screens  performed.  Start Lexapro, handout given.  Return to clinic in 6 weeks with repeat screens.        Orders:  -     EScitalopram oxalate (LEXAPRO) 10 MG tablet; Take 1 tablet (10 mg total) by mouth once daily.  Dispense: 30 tablet; Refill: 2    Anxiety  Assessment & Plan:  Chronic problem with progression not well controlled  per patient  and moderate based on  screens performed.  Start Lexapro, handout given.  Return to clinic in 6 weeks with repeat screens.      Orders:  -     EScitalopram oxalate (LEXAPRO) 10 MG tablet; Take 1 tablet (10 mg total) by mouth once daily.  Dispense: 30 tablet; Refill: 2    Nicotine dependence, cigarettes, with unspecified nicotine-induced disorders  Assessment & Plan:  Chronic problem.     Smoking cessation counseling:  Approximately 4 minutes of this encounter was spent on smoking cessation counseling.  Our discussion included, but was not limited to the following: We discussed the dose-dependent risk of smoking with associated malignancy.  We also discussed the parabolic curve of associated cardiovascular risk with the highest increased risk being seen within the first 2 cigarettes a day.  We also discussed wound healing complications associated with tobacco smoking.  Additionally, we discussed medication assisted therapy which included Chantix as preferred therapy. Patient will attempt slow taper approach.         Orders:  -     CT Chest Lung Screening Low Dose; Future; Expected date: 07/01/2025    Primary hypertension  Assessment & Plan:  Chronic problem stable based on  in-clinic vitals.   Continue current regimen including Lisinopril and metoprolol.                   Kevin Prasad             Portions of this note were created using voice recognition software. There may be voice recognition errors found in the text, and attempts were made to correct these errors prior to signature.            [1]   Outpatient Encounter Medications as of 7/1/2025   Medication Sig Dispense Refill     aspirin 81 MG Chew CHEW AND SWALLOW 1 TABLET(81 MG) BY MOUTH EVERY DAY 90 tablet 0    lisinopriL 10 MG tablet Take 1 tablet (10 mg total) by mouth once daily. 90 tablet 0    metoprolol tartrate (LOPRESSOR) 25 MG tablet Take 1 tablet (25 mg total) by mouth 2 (two) times daily. 180 tablet 0    nitroGLYCERIN (NITROSTAT) 0.4 MG SL tablet Place 1 tablet (0.4 mg total) under the tongue every 5 (five) minutes as needed for Chest pain. 30 tablet 1    [DISCONTINUED] atorvastatin (LIPITOR) 80 MG tablet Take 1 tablet (80 mg total) by mouth once daily. 90 tablet 0    [DISCONTINUED] ticagrelor (BRILINTA) 90 mg tablet Take 1 tablet (90 mg total) by mouth 2 (two) times daily. 60 tablet 1    atorvastatin (LIPITOR) 80 MG tablet Take 1 tablet (80 mg total) by mouth once daily. 90 tablet 3    EScitalopram oxalate (LEXAPRO) 10 MG tablet Take 1 tablet (10 mg total) by mouth once daily. 30 tablet 2    pantoprazole (PROTONIX) 40 MG tablet Take 1 tablet (40 mg total) by mouth once daily. (Patient not taking: Reported on 7/1/2025) 30 tablet 0    ticagrelor (BRILINTA) 90 mg tablet Take 1 tablet (90 mg total) by mouth 2 (two) times daily. 60 tablet 0    [DISCONTINUED] polyethylene glycol (GLYCOLAX) 17 gram PwPk Take 17 g by mouth once daily. 30 packet 0     No facility-administered encounter medications on file as of 7/1/2025.

## 2025-07-01 NOTE — ASSESSMENT & PLAN NOTE
Chronic problem with progression not well controlled  per patient  and moderate based on  screens performed.  Start Lexapro, handout given.  Return to clinic in 6 weeks with repeat screens.

## 2025-07-01 NOTE — ASSESSMENT & PLAN NOTE
Chronic problem not well controlled  based on previous metabolic panel, which I reviewed.  POC A1c obtained today reviewed with the patient without signs of prediabetes or diabetes.

## 2025-07-09 ENCOUNTER — OFFICE VISIT (OUTPATIENT)
Dept: CARDIOLOGY | Facility: CLINIC | Age: 65
End: 2025-07-09
Payer: MEDICARE

## 2025-07-09 VITALS
DIASTOLIC BLOOD PRESSURE: 89 MMHG | HEIGHT: 75 IN | HEART RATE: 64 BPM | WEIGHT: 274.63 LBS | BODY MASS INDEX: 34.15 KG/M2 | SYSTOLIC BLOOD PRESSURE: 151 MMHG | OXYGEN SATURATION: 95 %

## 2025-07-09 DIAGNOSIS — I10 UNCONTROLLED HYPERTENSION: ICD-10-CM

## 2025-07-09 DIAGNOSIS — R07.9 CHEST PAIN, UNSPECIFIED TYPE: ICD-10-CM

## 2025-07-09 DIAGNOSIS — I25.2 HISTORY OF ST ELEVATION MYOCARDIAL INFARCTION (STEMI): ICD-10-CM

## 2025-07-09 DIAGNOSIS — E66.811 OBESITY (BMI 30.0-34.9): ICD-10-CM

## 2025-07-09 DIAGNOSIS — I25.2 HISTORY OF ST ELEVATION MYOCARDIAL INFARCTION: Primary | ICD-10-CM

## 2025-07-09 DIAGNOSIS — I25.10 CORONARY ARTERY DISEASE INVOLVING NATIVE CORONARY ARTERY OF NATIVE HEART WITHOUT ANGINA PECTORIS: Chronic | ICD-10-CM

## 2025-07-09 DIAGNOSIS — I25.10 CORONARY ARTERY DISEASE WITHOUT ANGINA PECTORIS, UNSPECIFIED VESSEL OR LESION TYPE, UNSPECIFIED WHETHER NATIVE OR TRANSPLANTED HEART: Primary | Chronic | ICD-10-CM

## 2025-07-09 PROCEDURE — 99999 PR PBB SHADOW E&M-EST. PATIENT-LVL III: CPT | Mod: PBBFAC,,,

## 2025-07-09 PROCEDURE — 99214 OFFICE O/P EST MOD 30 MIN: CPT | Mod: S$PBB,,,

## 2025-07-09 PROCEDURE — 99213 OFFICE O/P EST LOW 20 MIN: CPT | Mod: PBBFAC,PN

## 2025-07-09 RX ORDER — TICAGRELOR 90 MG/1
90 TABLET, FILM COATED ORAL 2 TIMES DAILY
Qty: 180 TABLET | Refills: 3 | Status: SHIPPED | OUTPATIENT
Start: 2025-07-09 | End: 2025-10-07

## 2025-07-09 RX ORDER — LISINOPRIL 10 MG/1
10 TABLET ORAL DAILY
Qty: 90 TABLET | Refills: 3 | Status: SHIPPED | OUTPATIENT
Start: 2025-07-09 | End: 2025-07-09

## 2025-07-09 RX ORDER — LISINOPRIL 20 MG/1
20 TABLET ORAL DAILY
Qty: 90 TABLET | Refills: 3 | Status: SHIPPED | OUTPATIENT
Start: 2025-07-09 | End: 2025-10-07

## 2025-07-09 RX ORDER — METOPROLOL TARTRATE 25 MG/1
25 TABLET, FILM COATED ORAL 2 TIMES DAILY
Qty: 180 TABLET | Refills: 3 | Status: SHIPPED | OUTPATIENT
Start: 2025-07-09 | End: 2026-07-09

## 2025-07-09 RX ORDER — ATORVASTATIN CALCIUM 80 MG/1
80 TABLET, FILM COATED ORAL DAILY
Qty: 90 TABLET | Refills: 3 | Status: SHIPPED | OUTPATIENT
Start: 2025-07-09 | End: 2026-07-04

## 2025-07-09 NOTE — ASSESSMENT & PLAN NOTE
Hx CAD- PCI mid circumflex 2022  STEMI PCI mid RCA   Continue DAPT and statin therapy  Continue metoprolol tart 25 mg BID and lisinopril 20 mg daily  Nitro PRN

## 2025-07-09 NOTE — PROGRESS NOTES
Subjective:    Patient ID:  Stuart Adler is a 64 y.o. male patient here for evaluation Hospital Follow Up and Shortness of Breath (Breathing  issues )      History of Present Illness    CHIEF COMPLAINT:  Patient presents today for follow up after recent STEMI with stent placement to RCA     RECENT CARDIAC HISTORY:  He experienced a cardiac event April 2025 requiring stent placement in mid to distal RCA. During hospitalization prior to PCI, he experienced ventricular fibrillation requiring CPR. He denies chest pain since hospital discharge and reports no nitroglycerin use since the cardiac event.    CURRENT SYMPTOMS:  He experiences episodes of sense of doom, primarily when laying down, with sensation of forgetting to breathe. He denies associated chest pain, palpitations, lightheadedness, dizziness, or dyspnea. These episodes may be related to his disturbed sleep schedule, feeling caught between trying to sleep and stay awake.    SLEEP AND ENT:  He reports disrupted sleep habits due to sinus problems. He can only sleep on his right side and requires multiple pillows to prop himself up to help keep his sinuses clear. His sinuses frequently become clogged, contributing to dyspnea during sleep.    SOCIAL HISTORY:  He currently smokes half pack of cigarettes daily and is actively attempting to reduce tobacco consumption without formal cessation program. Smokes 1/2 ppd. He reports minimal alcohol consumption with significant reduction in overall intake.    MEDICAL HISTORY:  History includes skin cancer (not currently followed by dermatology) and recent kidney stone. Recent cardiac event resulted in rib fracture during CPR.      ROS:  General: -fever, -chills, -fatigue, -weight gain, -weight loss  ENT: -ear pain, +nasal congestion, -sore throat  Cardiovascular: -chest pain, -palpitations, -lower extremity edema  Respiratory: -cough, -shortness of breath, +shortness of breath lying down  Gastrointestinal: -abdominal  pain, -nausea, -vomiting, -diarrhea, -constipation, -blood in stool  Genitourinary: -dysuria, -hematuria, -frequency  Musculoskeletal: -joint pain, -muscle pain  Skin: -rash, -lesion  Neurological: -headache, -dizziness, -numbness, -tingling  Psychiatric: +anxiety, -depression, +sleep difficulty, +difficulty falling asleep                    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    Kidney stones 04/2025    Right eye injury     removed due to skin CA    Skin cancer     Smoker      Past Surgical History:   Procedure Laterality Date    ANGIOGRAM, CORONARY, WITH LEFT HEART CATHETERIZATION N/A 1/28/2022    Procedure: Angiogram, Coronary, with Left Heart Cath;  Surgeon: Nacho Orellana MD;  Location: [facility] CATH/EP LAB;  Service: Cardiology;  Laterality: N/A;    CYSTOSCOPY N/A 4/10/2025    Procedure: CYSTOSCOPY;  Surgeon: Kaleb Gaffney MD;  Location: [facility] OR;  Service: Urology;  Laterality: N/A;    CYSTOSCOPY W/ URETERAL STENT PLACEMENT Right 3/27/2025    Procedure: CYSTOSCOPY, WITH URETERAL STENT INSERTION;  Surgeon: Kaleb Gaffney MD;  Location: [facility] OR;  Service: Urology;  Laterality: Right;    CYSTOSCOPY W/ URETERAL STENT REMOVAL Bilateral 4/10/2025    Procedure: CYSTOSCOPY, WITH URETERAL STENT REMOVAL;  Surgeon: Kaleb Gaffney MD;  Location: [facility] OR;  Service: Urology;  Laterality: Bilateral;    FACIAL COSMETIC SURGERY      IVUS, CORONARY  4/30/2025    Procedure: IVUS, Coronary;  Surgeon: Maria Luisa Barcenas MD;  Location: [facility] CATH/EP LAB;  Service: Cardiology;;    LEFT HEART CATHETERIZATION Left 4/30/2025    Procedure: Left heart cath;  Surgeon: Maria Luisa Barcenas MD;  Location: [facility] CATH/EP LAB;  Service: Cardiology;  Laterality: Left;    PERCUTANEOUS CORONARY INTERVENTION (PCI) FOR CHRONIC  TOTAL OCCLUSION OF CORONARY ARTERY      PERCUTANEOUS CORONARY INTERVENTION, ARTERY N/A 4/30/2025    Procedure: Percutaneous coronary intervention;  Surgeon: Maria Luisa Barcenas MD;  Location: [facility] CATH/EP LAB;  Service: Cardiology;  Laterality: N/A;    URETEROSCOPIC REMOVAL OF URETERIC CALCULUS Left 4/10/2025    Procedure: REMOVAL, CALCULUS, URETER, URETEROSCOPIC;  Surgeon: Kaleb Gaffney MD;  Location: [facility] OR;  Service: Urology;  Laterality: Left;    URETEROSCOPY, WITH LASER LITHOTRIPSY Left 4/10/2025    Procedure: URETEROSCOPY, WITH LASER LITHOTRIPSY;  Surgeon: Kaleb Gaffney MD;  Location: [facility] OR;  Service: Urology;  Laterality: Left;     Social History[1]                Objective        Vitals:    07/09/25 0802   BP: (!) 151/89   Pulse: 64       LIPIDS - LAST 2   Lab Results   Component Value Date    CHOL 170 05/01/2025    CHOL 233 (H) 04/09/2023    HDL 36 (L) 05/01/2025    HDL 43 04/09/2023    LDLCALC 104.0 05/01/2025    LDLCALC 169.0 (H) 04/09/2023    TRIG 150 05/01/2025    TRIG 105 04/09/2023    CHOLHDL 21.2 05/01/2025    CHOLHDL 18.5 (L) 04/09/2023       CBC - LAST 2  Lab Results   Component Value Date    WBC 5.87 05/03/2025    WBC 5.73 05/02/2025    RBC 4.81 05/03/2025    RBC 4.79 05/02/2025    HGB 15.4 05/03/2025    HGB 15.7 05/02/2025    HCT 46.9 05/03/2025    HCT 47.0 05/02/2025    MCV 98 05/03/2025    MCV 98 05/02/2025    MCH 32.0 (H) 05/03/2025    MCH 32.8 (H) 05/02/2025    MCHC 32.8 05/03/2025    MCHC 33.4 05/02/2025    RDW 12.4 05/03/2025    RDW 12.2 05/02/2025     (L) 05/03/2025     (L) 05/02/2025    MPV 11.1 05/03/2025    MPV 11.0 05/02/2025    GRAN 4.8 04/11/2023    GRAN 64.6 04/11/2023    LYMPH 1.6 04/11/2023    LYMPH 22.3 04/11/2023    MONO 0.7 04/11/2023    MONO 10.1 04/11/2023    BASO 0.04 04/11/2023    BASO 0.04 04/10/2023    NRBC 0 05/03/2025    NRBC 0 05/02/2025       CHEMISTRY & LIVER FUNCTION - LAST 2  Lab Results   Component Value Date     05/03/2025    NA  137 05/02/2025    K 4.1 05/03/2025    K 4.0 05/02/2025     05/03/2025     05/02/2025    CO2 25 05/03/2025    CO2 28 05/02/2025    ANIONGAP 7 (L) 05/03/2025    ANIONGAP 7 (L) 05/02/2025    BUN 18 05/03/2025    BUN 13 05/02/2025    CREATININE 0.8 05/03/2025    CREATININE 0.8 05/02/2025     05/03/2025     05/02/2025    CALCIUM 8.7 05/03/2025    CALCIUM 8.8 05/02/2025    MG 2.0 05/03/2025    MG 2.0 05/02/2025    ALBUMIN 3.6 05/03/2025    ALBUMIN 3.7 05/02/2025    PROT 6.5 05/03/2025    PROT 6.6 05/02/2025    ALKPHOS 68 05/03/2025    ALKPHOS 73 05/02/2025    ALT 26 05/03/2025    ALT 24 05/02/2025    AST 33 05/03/2025    AST 46 (H) 05/02/2025    BILITOT 0.8 05/03/2025    BILITOT 0.8 05/02/2025        CARDIAC PROFILE - LAST 2  Lab Results   Component Value Date    BNP 49 04/30/2025    BNP 21 04/08/2023     01/26/2021    CPKMB 13.0 (H) 01/28/2022    CPKMB 12.5 (H) 01/28/2022    TROPONINI 0.856 (HH) 01/28/2022    TROPONINI 0.296 (H) 01/28/2022    TROPONINIHS 25.0 (H) 04/09/2023    TROPONINIHS 23.7 (H) 04/08/2023        COAGULATION - LAST 2  Lab Results   Component Value Date    LABPT 13.1 01/28/2022    LABPT 13.3 01/26/2021    INR 1.1 04/30/2025    INR 3.2 (H) 04/30/2025    APTT 28.3 04/10/2025    APTT 28.6 01/29/2022       ENDOCRINE & PSA - LAST 2  Lab Results   Component Value Date    HGBA1C 5.5 04/09/2023    TSH 1.480 04/09/2023        ECHOCARDIOGRAM RESULTS  Results for orders placed during the hospital encounter of 04/30/25    Echo    Interpretation Summary    Left Ventricle: Left ventricle was not well visualized due to poor sonic window. The left ventricle is normal in size. Normal wall thickness. There is normal systolic function with a visually estimated ejection fraction of 55 - 60%. There is normal diastolic function. E/A ratio is 1.11.    Right Ventricle: The right ventricle is normal in size.    Left Atrium: Mildly dilated    Aortic Valve: There is moderate aortic valve  sclerosis. Mildly calcified cusps.    Mitral Valve: Not well visualized due to poor acoustic window. There is mild to moderate regurgitation.    Tricuspid Valve: Not well visualized due to poor acoustic window.      CURRENT/PREVIOUS VISIT EKG  Results for orders placed or performed during the hospital encounter of 04/30/25   EKG 12-lead    Collection Time: 04/30/25  6:59 AM   Result Value Ref Range    QRS Duration 94 ms    [facility] QTC Calculation 465 ms    Narrative    Test Reason : R07.9,    Vent. Rate :  65 BPM     Atrial Rate :  65 BPM     P-R Int : 144 ms          QRS Dur :  94 ms      QT Int : 448 ms       P-R-T Axes :  64  45  57 degrees    QTcB Int : 465 ms    Normal sinus rhythm  Normal ECG  No previous ECGs available  Confirmed by Walter Ahumada ([id number]) on 4/30/2025 9:35:31 PM    Referred By: AAAREFERRAL SELF           Confirmed By: Walter Ahumada     No valid procedures specified.   Results for orders placed during the hospital encounter of 04/08/23    Nuclear Stress Test    Interpretation Summary    The ECG portion of the study is negative for ischemia.    The patient reported no chest pain during the stress test.    There were no arrhythmias during stress.    The nuclear portion of this study will be reported separately.    No valid procedures specified.    Physical Exam    Vitals: Elevated blood pressure. Heart rate on lower end of normal.  General: No acute distress. Well-developed. Well-nourished.  HENT: History of skin cancer on R face- removal of R eye with skin graft  Cardiovascular: Regular rate. Regular rhythm. No murmurs. No rubs. No gallops. Normal S1, S2.  Respiratory: Normal respiratory effort. Clear to auscultation bilaterally. No rales. No rhonchi. No wheezing.  Abdomen: Soft. Non-tender. Non-distended. Normoactive bowel sounds.  Musculoskeletal: No  obvious deformity.  Extremities: No lower extremity edema.  Neurological: Alert & oriented x3. No slurred speech. Normal gait.  Psychiatric: Normal  mood. Normal affect. Good insight. Good judgment.  Skin: Warm. Dry. No rash.         I HAVE REVIEWED :    The vital signs, nurses notes, and all the pertinent radiology and labs.        Current Outpatient Medications   Medication Instructions    aspirin 81 MG Chew CHEW AND SWALLOW 1 TABLET(81 MG) BY MOUTH EVERY DAY    atorvastatin (LIPITOR) 80 mg, Oral, Daily    EScitalopram oxalate (LEXAPRO) 10 mg, Oral, Daily    lisinopriL (PRINIVIL,ZESTRIL) 20 mg, Oral, Daily    metoprolol tartrate (LOPRESSOR) 25 mg, Oral, 2 times daily    nitroGLYCERIN (NITROSTAT) 0.4 mg, Sublingual, Every 5 min PRN    ticagrelor (BRILINTA) 90 mg, Oral, 2 times daily          Assessment & Plan   Assessment & Plan    I25.10 Coronary artery disease without angina pectoris, unspecified vessel or lesion type, unspecified whether native or transplanted heart  I10 Uncontrolled hypertension  I25.2 History of ST elevation myocardial infarction (STEMI)  E66.811 Obesity (BMI 30.0-34.9)  R07.9 Chest pain, unspecified type    PLAN SUMMARY:  - Refill all cardiac medications for 1 year  - Continue Atorvastatin, Brilinta (twice daily), and Aspirin (OTC)  - Increase Lisinopril from 10 mg to 20 mg daily  - Maintain current Metoprolol dose  - Order cardiac monitor  - Refer patient to cardiac rehabilitation program  - Patient to continue smoking reduction efforts  - Patient to work on weight reduction  - Follow-up in 2 months    CORONARY ARTERY DISEASE WITHOUT ANGINA PECTORIS, UNSPECIFIED VESSEL OR LESION TYPE, UNSPECIFIED WHETHER NATIVE OR TRANSPLANTED HEART:  - Maintained current Metoprolol  tart 25 mg BID  - Continued Atorvastatin.  - Continued Brilinta twice daily. 1 year refill for all cardiac medications.  - Ordered cardiac monitor - patient reports feeling anxious with sense of impending doom at times- no associate CP/MARTINES/dizziness/palpitations  - Patient to continue efforts to reduce smoking, currently at 0.5 pack per day.  - Follow up in 2  months.    UNCONTROLLED HYPERTENSION:  - Increased Lisinopril from 10 mg to 20 mg daily due to elevated blood pressure. 1 year refill for all cardiac medications.  - Follow up in 2 months.    HISTORY OF ST ELEVATION MYOCARDIAL INFARCTION (STEMI):  - Explained cardiac rehab benefits post-heart attack for exercise guidance.  - Continued Aspirin (OTC).  - Continued Brilinta twice daily. 1 year refill for all cardiac medications.  - Referred patient to cardiac rehabilitation program.  - Follow up in 2 months.    OBESITY (BMI 30.0-34.9):  - Patient to work on weight reduction.  - Follow up in 2 months.    CHEST PAIN, UNSPECIFIED TYPE:  - Ordered cardiac monitor due to report of sense of impending doom, to be placed after insurance processing.  - Follow up in 2 months.           STEMI (ST elevation myocardial infarction)  Continue DAPT and statin therapy. Smoking cessation encouraged.     CARDIAC CATH 4/30/25    64 year old male with a medical history signfiicant for CAD s/p PCI in the past, tobacco abuse, HTN, and hyperlipidemia who presented with inferior STEMI complicated by vfib arrest. Patient underwent urgent coronary angiography and IVUS guided PCI of the mid/distal RCA with 3.5x20mm BROOKE.    Recommend DAPT for at least 1 year, then lifelong ASA.    Recommend aggressive medical management, tobacco cessation, and close interval follow up in cardiology clinic.    The estimated blood loss was <50 mL.    Obesity (BMI 30.0-34.9)  Cardiac diet  Calorie restrictive diet  Weight loss and exercise as tolerated    CAD (coronary artery disease)  Hx CAD- PCI mid circumflex 2022  STEMI PCI mid RCA   Continue DAPT and statin therapy  Continue metoprolol tart 25 mg BID and lisinopril 20 mg daily  Nitro PRN    Chest pain  Denies anginal equivalent symptoms  Cardiac rehab ordered  Continue DAPT and statin therapy  Maintain /80 or less    History of ST elevation myocardial infarction (STEMI)  Continue DAPT and statin therapy.  Smoking cessation encouraged. Increase lisinopril 20 mg daily.  Continue metoprolol tart 25 mg BID    CARDIAC CATH 4/30/25    64 year old male with a medical history signfiicant for CAD s/p PCI in the past, tobacco abuse, HTN, and hyperlipidemia who presented with inferior STEMI complicated by vfib arrest. Patient underwent urgent coronary angiography and IVUS guided PCI of the mid/distal RCA with 3.5x20mm BROOKE.    Recommend DAPT for at least 1 year, then lifelong ASA.    Recommend aggressive medical management, tobacco cessation, and close interval follow up in cardiology clinic.    The estimated blood loss was <50 mL.      2 month follow up     This note was generated with the assistance of ambient listening technology. Verbal consent was obtained by the patient and accompanying visitor(s) for the recording of patient appointment to facilitate this note. I attest to having reviewed and edited the generated note for accuracy, though some syntax or spelling errors may persist. Please contact the author of this note for any clarification.             [1]   Social History  Tobacco Use    Smoking status: Every Day     Current packs/day: 0.75     Average packs/day: 0.7 packs/day for 39.5 years (29.6 ttl pk-yrs)     Types: Cigarettes     Start date: 1986   Substance Use Topics    Alcohol use: Yes     Comment: 1/4 bottle whiskey every day    Drug use: Yes     Types: Marijuana

## 2025-07-09 NOTE — ASSESSMENT & PLAN NOTE
Denies anginal equivalent symptoms  Cardiac rehab ordered  Continue DAPT and statin therapy  Maintain /80 or less

## 2025-07-09 NOTE — ASSESSMENT & PLAN NOTE
Continue DAPT and statin therapy. Smoking cessation encouraged.     CARDIAC CATH 4/30/25    64 year old male with a medical history signfiicant for CAD s/p PCI in the past, tobacco abuse, HTN, and hyperlipidemia who presented with inferior STEMI complicated by vfib arrest. Patient underwent urgent coronary angiography and IVUS guided PCI of the mid/distal RCA with 3.5x20mm BROOKE.    Recommend DAPT for at least 1 year, then lifelong ASA.    Recommend aggressive medical management, tobacco cessation, and close interval follow up in cardiology clinic.    The estimated blood loss was <50 mL.

## 2025-07-09 NOTE — ASSESSMENT & PLAN NOTE
Continue DAPT and statin therapy. Smoking cessation encouraged. Increase lisinopril 20 mg daily.  Continue metoprolol tart 25 mg BID    CARDIAC CATH 4/30/25    64 year old male with a medical history signfiicant for CAD s/p PCI in the past, tobacco abuse, HTN, and hyperlipidemia who presented with inferior STEMI complicated by vfib arrest. Patient underwent urgent coronary angiography and IVUS guided PCI of the mid/distal RCA with 3.5x20mm BROOKE.    Recommend DAPT for at least 1 year, then lifelong ASA.    Recommend aggressive medical management, tobacco cessation, and close interval follow up in cardiology clinic.    The estimated blood loss was <50 mL.

## 2025-07-10 ENCOUNTER — HOSPITAL ENCOUNTER (OUTPATIENT)
Dept: RADIOLOGY | Facility: HOSPITAL | Age: 65
Discharge: HOME | End: 2025-07-10
Attending: STUDENT IN AN ORGANIZED HEALTH CARE EDUCATION/TRAINING PROGRAM
Payer: MEDICARE

## 2025-07-10 DIAGNOSIS — F17.219 NICOTINE DEPENDENCE, CIGARETTES, WITH UNSPECIFIED NICOTINE-INDUCED DISORDERS: ICD-10-CM

## 2025-07-10 PROCEDURE — 71271 CT THORAX LUNG CANCER SCR C-: CPT | Mod: TC,PO

## 2025-07-10 PROCEDURE — 71271 CT THORAX LUNG CANCER SCR C-: CPT | Mod: 26,,, | Performed by: RADIOLOGY

## 2025-07-17 ENCOUNTER — HOSPITAL ENCOUNTER (OUTPATIENT)
Dept: CARDIOLOGY | Facility: CLINIC | Age: 65
Discharge: HOME | End: 2025-07-17
Payer: MEDICARE

## 2025-07-17 DIAGNOSIS — I25.10 CORONARY ARTERY DISEASE WITHOUT ANGINA PECTORIS, UNSPECIFIED VESSEL OR LESION TYPE, UNSPECIFIED WHETHER NATIVE OR TRANSPLANTED HEART: Chronic | ICD-10-CM

## 2025-07-17 DIAGNOSIS — I25.2 HISTORY OF ST ELEVATION MYOCARDIAL INFARCTION (STEMI): ICD-10-CM

## 2025-07-24 ENCOUNTER — EXTERNAL HOME HEALTH (OUTPATIENT)
Dept: HOME HEALTH SERVICES | Facility: HOSPITAL | Age: 65
End: 2025-07-24
Payer: MEDICARE

## 2025-08-05 ENCOUNTER — CLINICAL SUPPORT (OUTPATIENT)
Dept: CARDIAC REHAB | Facility: HOSPITAL | Age: 65
End: 2025-08-05
Payer: MEDICARE

## 2025-08-05 DIAGNOSIS — I21.3 STEMI (ST ELEVATION MYOCARDIAL INFARCTION): Primary | ICD-10-CM

## 2025-08-05 DIAGNOSIS — I25.2 HISTORY OF ST ELEVATION MYOCARDIAL INFARCTION (STEMI): ICD-10-CM

## 2025-08-05 PROCEDURE — 93797 PHYS/QHP OP CAR RHAB WO ECG: CPT

## 2025-08-13 ENCOUNTER — EXTERNAL HOME HEALTH (OUTPATIENT)
Dept: HOME HEALTH SERVICES | Facility: HOSPITAL | Age: 65
End: 2025-08-13
Payer: MEDICARE

## 2025-08-15 ENCOUNTER — TELEPHONE (OUTPATIENT)
Dept: FAMILY MEDICINE | Facility: CLINIC | Age: 65
End: 2025-08-15
Payer: MEDICARE

## 2025-09-04 ENCOUNTER — TELEPHONE (OUTPATIENT)
Dept: FAMILY MEDICINE | Facility: CLINIC | Age: 65
End: 2025-09-04
Payer: MEDICARE

## (undated) DEVICE — CATH DXTERITY JR40 100CM 6FR

## (undated) DEVICE — SCRUB DYNA-HEX LIQ 4% CHG 4OZ

## (undated) DEVICE — CATH DXTERITY JL40 100CM 6FR

## (undated) DEVICE — GUIDEWIRE AMPLATZ .035INX180X7

## (undated) DEVICE — GLOVE SURG BIOGEL LATEX SZ 7.5

## (undated) DEVICE — SET IRR CYSTO TUR Y-TYPE 90IN

## (undated) DEVICE — SOL IRRIGATION WATER 3000ML

## (undated) DEVICE — GUIDEWIRE J 3MM .035IN 150

## (undated) DEVICE — SOL IRRI STRL WATER 1000ML

## (undated) DEVICE — TUBING SUC UNIV W/CONN 12FT

## (undated) DEVICE — CATH NC EMERGE MR 3.75X12MM

## (undated) DEVICE — GUIDEWIRE RUNTHROUGH 180CM

## (undated) DEVICE — KIT CATH URTRL FLEXIMA 5FR

## (undated) DEVICE — CATHETER DIAGNOSTIC DXTERITY 6FR JL 4

## (undated) DEVICE — SOL NACL IRR 1000ML BTL

## (undated) DEVICE — GUIDEWIRE ZIPWIRE .035 D 150CM

## (undated) DEVICE — PACK CYSTOSCOPY III SMH

## (undated) DEVICE — CATH EAGLE EYE PLATINUM

## (undated) DEVICE — GUIDEWIRE RUNTHROUGH EF 180CM

## (undated) DEVICE — SHEATH INTRODUCER PRECISION ACCESS 6FX10

## (undated) DEVICE — CATH NC EMERGE MR 3.5X15MM

## (undated) DEVICE — GLOVE SENSICARE PI GRN 6.5

## (undated) DEVICE — FIBER QUANTA OPT STD 365UM

## (undated) DEVICE — EXTRACTOR STNE 3WR115CMX2.5FR

## (undated) DEVICE — HEMOSTAT VASC BAND REG 24CM

## (undated) DEVICE — KIT MICROINTRODUCE MINI 5X10CM

## (undated) DEVICE — SHEATH INTRODUCER SLENDER 6FX10CM

## (undated) DEVICE — CATHETER GUIDE LAUNCHER EBU3.5 6X110

## (undated) DEVICE — CATHETER DIAGNOSTIC DXTERITY 6F PIGST

## (undated) DEVICE — SHEATH INTRODUCER 6FR 11CM

## (undated) DEVICE — GUIDEWIRE J TIP EXCHANGE FIXED CORE 260

## (undated) DEVICE — CATHETER DIAGNOSTIC DXTERITY 6FR JR 4.0

## (undated) DEVICE — CATH LNCHR JR40 6F 110CM

## (undated) DEVICE — CATH EMERGE MR 15 X 2.50

## (undated) DEVICE — VISIPAQUE CONTRAST 320MG/100ML

## (undated) DEVICE — STRAP OR TABLE 5IN X 72IN

## (undated) DEVICE — CATHETER RADIAL TIG 4.0 OPTITORQUE 5X110

## (undated) DEVICE — GUIDEWIRE DOUBLE ENDED .035 DIA. 150CML